# Patient Record
Sex: FEMALE | Race: WHITE | NOT HISPANIC OR LATINO | Employment: OTHER | URBAN - NONMETROPOLITAN AREA
[De-identification: names, ages, dates, MRNs, and addresses within clinical notes are randomized per-mention and may not be internally consistent; named-entity substitution may affect disease eponyms.]

---

## 2021-11-22 ENCOUNTER — OFFICE VISIT (OUTPATIENT)
Dept: CARDIOLOGY | Facility: CLINIC | Age: 66
End: 2021-11-22

## 2021-11-22 VITALS
HEIGHT: 67 IN | DIASTOLIC BLOOD PRESSURE: 85 MMHG | OXYGEN SATURATION: 100 % | HEART RATE: 61 BPM | SYSTOLIC BLOOD PRESSURE: 135 MMHG | WEIGHT: 146 LBS | BODY MASS INDEX: 22.91 KG/M2

## 2021-11-22 DIAGNOSIS — I35.0 AORTIC STENOSIS, MODERATE: ICD-10-CM

## 2021-11-22 DIAGNOSIS — I10 PRIMARY HYPERTENSION: ICD-10-CM

## 2021-11-22 DIAGNOSIS — Q23.1 BICUSPID AORTIC VALVE: Primary | ICD-10-CM

## 2021-11-22 DIAGNOSIS — R07.9 CHEST PAIN, UNSPECIFIED TYPE: ICD-10-CM

## 2021-11-22 PROBLEM — Q23.81 BICUSPID AORTIC VALVE: Status: ACTIVE | Noted: 2021-11-22

## 2021-11-22 PROCEDURE — 99204 OFFICE O/P NEW MOD 45 MIN: CPT | Performed by: INTERNAL MEDICINE

## 2021-11-22 PROCEDURE — 93000 ELECTROCARDIOGRAM COMPLETE: CPT | Performed by: INTERNAL MEDICINE

## 2021-11-22 RX ORDER — MULTIPLE VITAMINS W/ MINERALS TAB 9MG-400MCG
1 TAB ORAL DAILY
COMMUNITY

## 2021-11-22 RX ORDER — BISOPROLOL FUMARATE AND HYDROCHLOROTHIAZIDE 5; 6.25 MG/1; MG/1
1 TABLET ORAL DAILY
COMMUNITY
Start: 2021-09-29 | End: 2022-07-21 | Stop reason: SDUPTHER

## 2021-11-22 RX ORDER — LANSOPRAZOLE 15 MG/1
15 CAPSULE, DELAYED RELEASE ORAL AS NEEDED
COMMUNITY
End: 2022-06-30 | Stop reason: ALTCHOICE

## 2021-11-22 RX ORDER — ALPRAZOLAM 0.5 MG/1
TABLET ORAL 2 TIMES DAILY PRN
COMMUNITY
End: 2022-06-30

## 2021-11-22 RX ORDER — ASCORBIC ACID 500 MG
500 TABLET ORAL DAILY
COMMUNITY

## 2021-11-22 RX ORDER — AMLODIPINE BESYLATE 2.5 MG/1
TABLET ORAL AS NEEDED
COMMUNITY
Start: 2021-09-29 | End: 2022-06-30

## 2021-11-22 RX ORDER — AMLODIPINE BESYLATE AND BENAZEPRIL HYDROCHLORIDE 5; 10 MG/1; MG/1
CAPSULE ORAL
COMMUNITY
Start: 2021-10-25 | End: 2021-12-28 | Stop reason: HOSPADM

## 2021-11-22 NOTE — PROGRESS NOTES
"    [facility] - CARDIOLOGY  New Patient Initial Outpatient Evaluation    Primary Care Physician: System, Provider Not In    Subjective     Chief Complaint: seeking second opinion regarding valvular heart disease    History of Present Illness  65yo referred by her primary provider, Hazel Mcmahan (APRN) for second opinion regarding management of valvular heart disease.  Fortunately, no records from the patient's cardiologist are available for review today, and we have not yet received the disc of echocardiographic images that we have previously requested from her prior cardiologist.  The patient is able to tell me that she has undergone several recent tests with her cardiologist, Dr. Agosto in [facility], and that he told her that \"he did not want to send her to [facility] yet for TAVR because the data was not yet known.\"  She also references that she was previously told that \"she was born with an abnormal valve.\"    Patient does produce, as well as data we received from her PCP, some handwritten records from the nurse from her cardiologist the mention of bicuspid aortic valve and moderate aortic stenosis along with a mild aortic aneurysm.  However, these handwritten notes are all on a visit from 2020.  There are no notes from  included.    In last 3 weeks, she's started noticing 'a lot of pressure' across her upper chest while up moving about the house.  Typically relieves with rest; no aggravating factors.  Total of about 4-5 occasions over the last 3-4 months. Associated symptoms include numbness - \"sometimes I feel like both my hands are numb.\"  Does not radiate.  Have lasted about 30-45 minutes.  No associated dyspnea, nausea, or diaphoresis.  On two of these occasions, she's had palpitations described as \"fluttering.\" She did have some episodes of chest heaviness back in Feb after her sister  of COVID; responded once then to xanax. Overall, symptoms now are dissimilar to those in February, " primarily because of pain/discomfort that is new.    No soa, except some dyspnea with housework that she attributes to COVID she had in August.  She has no associated orthopnea, PND, rapid weight gain, or significant leg swelling.  She does question some degree of mild ankle swelling she notices when she is on her feet for quite some time, that she states completely resolves within 30 minutes of elevating her legs.    Says she has had extensive workup done within the last month at the [facility] in [facility], including what sounded like a nuclear perfusion stress test a few weeks ago         Review of Systems   Constitutional: Negative for malaise/fatigue.   Cardiovascular: Positive for chest pain, irregular heartbeat, leg swelling (ankle) and palpitations. Negative for claudication, dyspnea on exertion, near-syncope, orthopnea, paroxysmal nocturnal dyspnea and syncope.   Respiratory: Negative for shortness of breath.    Hematologic/Lymphatic: Does not bruise/bleed easily.        Otherwise complete ROS reviewed and negative except as mentioned in the HPI.      Past Medical History:   Past Medical History:   Diagnosis Date   • Aneurysm (HCC)    • Congenital heart disease    • Heart murmur    • Heart valve disease    • Hypertension        Past Surgical History:History reviewed. No pertinent surgical history.    Family History: family history includes Heart attack in her father, maternal grandmother, and mother; Hypertension in her brother and brother.    Social History:  reports that she has never smoked. She has never used smokeless tobacco. She reports that she does not drink alcohol and does not use drugs.    Medications:  Prior to Admission medications    Medication Sig Start Date End Date Taking? Authorizing Provider   ALPRAZolam (XANAX) 0.5 MG tablet Take 0.5 mg by mouth 2 (Two) Times a Day As Needed for Anxiety.   Yes Provider, MD Jose D   amLODIPine (NORVASC) 2.5 MG tablet As Needed. Takes  "when bp is high 9/29/21  Yes Jose D Weller MD   amLODIPine-benazepril (LOTREL 5-10) 5-10 MG per capsule  10/25/21  Yes Jose D Weller MD   ascorbic acid (VITAMIN C) 500 MG tablet Take 500 mg by mouth Daily.   Yes Jose D Weller MD   ASPIRIN 81 PO Take 81 mg by mouth Daily.   Yes Jose D Weller MD   B Complex-C (VITAMIN B + C COMPLEX PO) Take  by mouth.   Yes Jose D Weller MD   bisoprolol-hydrochlorothiazide (ZIAC) 5-6.25 MG per tablet Take 1 tablet by mouth Daily. 9/29/21  Yes Jose D Weller MD   CALCIUM PO Take  by mouth.   Yes Jose D Weller MD   Cholecalciferol (D3 ADULT PO) Take  by mouth.   Yes Jose D Weller MD   Cyanocobalamin 1000 MCG/ML kit Inject  as directed.   Yes Jose D Weller MD   GARLIC PO Take  by mouth.   Yes Jose D Weller MD   lansoprazole (PREVACID) 15 MG capsule Take 15 mg by mouth As Needed.   Yes Jose D Weller MD   Multiple Vitamins-Minerals (ZINC PO) Take  by mouth.   Yes Jose D Weller MD   multivitamin with minerals tablet tablet Take 1 tablet by mouth Daily.   Yes Jose D Weller MD   Omega-3 Fatty Acids (OMEGA-3 FISH OIL PO) Take  by mouth.   Yes Jose D Weller MD   Probiotic Product (PROBIOTIC ADVANCED PO) Take  by mouth.   Yes Jose D Weller MD     Allergies:  Allergies   Allergen Reactions   • Penicillins Swelling   • Sulfa Antibiotics Hives       Objective     Vital Signs: /85   Pulse 61   Ht 169.2 cm (66.6\")   Wt 66.2 kg (146 lb)   SpO2 100%   BMI 23.14 kg/m²     Vitals and nursing note reviewed.   Constitutional:       General: Not in acute distress.     Appearance: Not in distress.   Neck:      Vascular: No JVD or JVR. JVD normal.   Pulmonary:      Effort: Pulmonary effort is normal.      Breath sounds: Normal breath sounds.   Cardiovascular:      Normal rate. Regular rhythm.      Murmurs: There is a grade 3/6 harsh midsystolic murmur at the URSB, radiating to the " neck. S2 is audible      No gallop. No click.   Pulses:     Intact distal pulses.   Edema:     Peripheral edema absent.   Musculoskeletal:         General: No tenderness. Skin:     General: Skin is warm and dry.   Neurological:      Mental Status: Alert, oriented to person, place, and time and oriented to person, place and time.         Results Reviewed:      ECG 12 Lead    Date/Time: 11/22/2021 4:59 PM  Performed by: Alexis Randhawa MD  Authorized by: Alexis Randhawa MD   Comparison: compared with previous ECG from 2/5/2021  Similar to previous ECG  Rhythm: sinus rhythm  Rate: normal  BPM: 61  Other findings: left ventricular hypertrophy    Clinical impression: abnormal EKG          External records reviewed: Records received from her PCP, Hazel Mcmahan (APRN): Included several visit summaries from her cardiologist with handwritten notes (which the patient tells me  External records reviewed:  Records provided with a referral from her primary provider (SHONDA Croft) were reviewed. These do include several visit summary sheets from 2020 visits with her cardiologist, Dr. 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  Assessment / Plan        Problem List Items Addressed This Visit (all new to me)       Cardiac and Vasculature    Bicuspid aortic valve - Primary    Other Relevant Orders    ECG 12 Lead    Hypertension: Appears well controlled    Relevant Medications    bisoprolol-hydrochlorothiazide (ZIAC) 5-6.25 MG per tablet    amLODIPine-benazepril (LOTREL 5-10) 5-10 MG per capsule    amLODIPine (NORVASC) 2.5 MG tablet    Other Relevant Orders    ECG 12 Lead    Aortic stenosis, moderate    Other Relevant Orders    ECG 12 Lead      Other Visit Diagnoses     Chest pain, unspecified type: Undifferentiated  etiology deserving of ischemic evaluation, though by way of subjective report it sounds like she has just completed 1 in [place]; records pending                     Hypertrophic cardiomyopathy: Unclear whether this is a confirmed diagnosis or not; is hand written on one sheet the patient provides from March 2020 visit with another cardiologist.     Recommendations and plans: By subjective report, patient has had multiple recent cardiac testing under the direction of her cardiologist in [place], [place]. She now wishes to transfer her care to us, but unfortunately, none of those recent records are available during our review today. A note from March 2020 has handwritten labels on it suggesting moderate aortic stenosis at that time along with a bicuspid valve and a mild degree of aortopathy. Clearly, more updated testing is needed for more accurate assessment of current severity. Regardless, even if her aortic stenosis is now severe by echocardiographic criteria, I doubt that she is symptomatic from it (ie no dyspnea, CHF symptoms), though would then have to further question whether the described chest discomfort is from the aortic stenosis.    If aortic stenosis is not severe, then she would need an ischemic evaluation to investigate her chest pain. However, it also sounds like she may have just had a nuclear stress test, so we will wait to obtain this, as well as the most recent echocardiogram to determine my own assessment of the severity of her aortic stenosis, before providing further advice.     We did spend the bulk of today's visit discussing the multidisciplinary Structural Heart Team approach here at [facility]. Once more information is available, I will review her case with the rest of our structural heart valve team in our weekly case review conference.    In the meantime, continue all current medical therapies. Follow-up will be determined after we have reviewed the necessary  "records.        Alexis Randhawa MD   11/22/21   18:20 CST     Addendum:  -Though I have not yet received the disc of images from the most recent echocardiogram to provide my own independent review, we did receive more updated records that were available during the time of my initial visit with the patient.  There was an office note from 9/29/2021 with Dr. RONDA Agosto of the [facility] in [place], [place].  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    -Echocardiogram performed that day on 9/29/2021, as interpreted by Dr. Agosto, reports the following: LV systolic cavity obliteration with no EF reported, mild LVH, grade 1 diastolic dysfunction, normal size left atrium, normal size and function of the right ventricle, mildly thickened mitral valve with mild mitral regurgitation.  He describes aortic valve is trileaflet and sclerotic, but then also states it is calcified with moderate to severe stenosis.  Velocity is reported in the LVOT at 3.9 m/s with a gradient of 15 mmHg and an aortic valve area of 0.9 cm².  He reports the aortic root itself is of normal size.    -Myocardial perfusion imaging graded stress test performed on 11/4/2021, interpreted by Dr. Agosto: States the patient did exercise by Efraín protocol, but does not report how long she was able to exercise.  She did reach target heart rate and it does say there were no EKG changes nor angina with exercise (EKGs for reports of the exercise portion of the test are otherwise not available).  He does report a Duke treadmill score +6, so I do " assume this means she exercised for the entirety of stage 1 and 2 of Efraín protocol (i.e. 6 minutes).  He reports abnormal perfusion imaging with inferoapical and basal basal septal thinning in the conclusions, but there is no mention in the body of the report regarding the perfusion images.    Updated recommendations and plans: In light of the above, I would still like to see the actual images from echocardiogram to generate my own opinion before making any more definitive plans.  The reported abnormalities from the myocardial perfusion imaging study are not comprehensively described, but I may still recommend diagnostic coronary angiography for diagnostic clarity since she is describing some exertional upper chest discomfort that is otherwise undifferentiated and does not seem to be likely from her valvular heart disease at this point, provided that my review of the echocardiogram does not reveal aortic valvular disease that is more severe than reported.    Electronically signed by Alexis Randhawa MD, 11/26/21, 8:28 AM CST.

## 2021-12-10 ENCOUNTER — TELEPHONE (OUTPATIENT)
Dept: CARDIOLOGY | Facility: CLINIC | Age: 66
End: 2021-12-10

## 2021-12-10 ENCOUNTER — PREP FOR SURGERY (OUTPATIENT)
Dept: OTHER | Facility: HOSPITAL | Age: 66
End: 2021-12-10

## 2021-12-10 DIAGNOSIS — R94.39 ABNORMAL STRESS TEST: Primary | ICD-10-CM

## 2021-12-10 RX ORDER — SODIUM CHLORIDE 0.9 % (FLUSH) 0.9 %
3 SYRINGE (ML) INJECTION EVERY 12 HOURS SCHEDULED
Status: CANCELLED | OUTPATIENT
Start: 2021-12-10

## 2021-12-10 RX ORDER — ASPIRIN 81 MG/1
324 TABLET, CHEWABLE ORAL ONCE
Status: CANCELLED | OUTPATIENT
Start: 2021-12-10 | End: 2021-12-10

## 2021-12-10 RX ORDER — SODIUM CHLORIDE 9 MG/ML
1-3 INJECTION, SOLUTION INTRAVENOUS CONTINUOUS
Status: CANCELLED | OUTPATIENT
Start: 2021-12-10

## 2021-12-10 RX ORDER — ASPIRIN 81 MG/1
81 TABLET ORAL DAILY
Status: CANCELLED | OUTPATIENT
Start: 2021-12-11

## 2021-12-10 RX ORDER — SODIUM CHLORIDE 0.9 % (FLUSH) 0.9 %
10 SYRINGE (ML) INJECTION AS NEEDED
Status: CANCELLED | OUTPATIENT
Start: 2021-12-10

## 2021-12-10 NOTE — TELEPHONE ENCOUNTER
Call back from pt. Informed her that her echo showed moderate AS, not severe per Dr. Randhawa. I also let her know that Dr. Randhawa would like to do a heart cath on her since she did have an abnormal stress and given her previous symptoms.    She verbalized understanding and is agreeable. She asked if she could wait and have her cath after [place]. I will check with Dr. Randhawa to be sure that is ok, but instructed her to report to ER if she has any chest tightness/pain/pressure or SOA that does not go away with rest. She verbalized understanding and reports that she has had no more episodes since her appt with Dr. Randhawa.

## 2021-12-10 NOTE — TELEPHONE ENCOUNTER
Call to pt to let her know that Dr. Randhawa has reviewed her 2D Echo from the other facility. Left a msg with my call back number requesting a call back.

## 2021-12-13 PROBLEM — R94.39 ABNORMAL STRESS TEST: Status: ACTIVE | Noted: 2021-12-13

## 2021-12-28 ENCOUNTER — HOSPITAL ENCOUNTER (OUTPATIENT)
Facility: HOSPITAL | Age: 66
Discharge: HOME | End: 2021-12-28
Attending: INTERNAL MEDICINE | Admitting: INTERNAL MEDICINE

## 2021-12-28 VITALS
WEIGHT: 143.2 LBS | BODY MASS INDEX: 23.01 KG/M2 | SYSTOLIC BLOOD PRESSURE: 115 MMHG | DIASTOLIC BLOOD PRESSURE: 71 MMHG | RESPIRATION RATE: 17 BRPM | TEMPERATURE: 96.2 F | OXYGEN SATURATION: 96 % | HEIGHT: 66 IN | HEART RATE: 62 BPM

## 2021-12-28 DIAGNOSIS — R94.39 ABNORMAL STRESS TEST: ICD-10-CM

## 2021-12-28 LAB
ALBUMIN SERPL-MCNC: 4.3 G/DL (ref 3.5–5.2)
ALBUMIN/GLOB SERPL: 1.7 G/DL
ALP SERPL-CCNC: 87 U/L (ref 39–117)
ALT SERPL W P-5'-P-CCNC: 16 U/L (ref 1–33)
ANION GAP SERPL CALCULATED.3IONS-SCNC: 9 MMOL/L (ref 5–15)
AST SERPL-CCNC: 23 U/L (ref 1–32)
BASOPHILS # BLD AUTO: 0.02 10*3/MM3 (ref 0–0.2)
BASOPHILS NFR BLD AUTO: 0.3 % (ref 0–1.5)
BILIRUB SERPL-MCNC: 0.5 MG/DL (ref 0–1.2)
BUN SERPL-MCNC: 20 MG/DL (ref 8–23)
BUN/CREAT SERPL: 22.2 (ref 7–25)
CALCIUM SPEC-SCNC: 9.6 MG/DL (ref 8.6–10.5)
CHLORIDE SERPL-SCNC: 103 MMOL/L (ref 98–107)
CO2 SERPL-SCNC: 28 MMOL/L (ref 22–29)
CREAT SERPL-MCNC: 0.9 MG/DL (ref 0.57–1)
DEPRECATED RDW RBC AUTO: 40.1 FL (ref 37–54)
EOSINOPHIL # BLD AUTO: 0.24 10*3/MM3 (ref 0–0.4)
EOSINOPHIL NFR BLD AUTO: 3.7 % (ref 0.3–6.2)
ERYTHROCYTE [DISTWIDTH] IN BLOOD BY AUTOMATED COUNT: 12 % (ref 12.3–15.4)
GFR SERPL CREATININE-BSD FRML MDRD: 62 ML/MIN/1.73
GLOBULIN UR ELPH-MCNC: 2.5 GM/DL
GLUCOSE SERPL-MCNC: 116 MG/DL (ref 65–99)
HCT VFR BLD AUTO: 40.2 % (ref 34–46.6)
HGB BLD-MCNC: 13.3 G/DL (ref 12–15.9)
IMM GRANULOCYTES # BLD AUTO: 0.02 10*3/MM3 (ref 0–0.05)
IMM GRANULOCYTES NFR BLD AUTO: 0.3 % (ref 0–0.5)
LYMPHOCYTES # BLD AUTO: 2.03 10*3/MM3 (ref 0.7–3.1)
LYMPHOCYTES NFR BLD AUTO: 31.2 % (ref 19.6–45.3)
MCH RBC QN AUTO: 30.3 PG (ref 26.6–33)
MCHC RBC AUTO-ENTMCNC: 33.1 G/DL (ref 31.5–35.7)
MCV RBC AUTO: 91.6 FL (ref 79–97)
MONOCYTES # BLD AUTO: 0.49 10*3/MM3 (ref 0.1–0.9)
MONOCYTES NFR BLD AUTO: 7.5 % (ref 5–12)
NEUTROPHILS NFR BLD AUTO: 3.71 10*3/MM3 (ref 1.7–7)
NEUTROPHILS NFR BLD AUTO: 57 % (ref 42.7–76)
NRBC BLD AUTO-RTO: 0 /100 WBC (ref 0–0.2)
PLATELET # BLD AUTO: 237 10*3/MM3 (ref 140–450)
PMV BLD AUTO: 9.7 FL (ref 6–12)
POTASSIUM SERPL-SCNC: 4 MMOL/L (ref 3.5–5.2)
PROT SERPL-MCNC: 6.8 G/DL (ref 6–8.5)
RBC # BLD AUTO: 4.39 10*6/MM3 (ref 3.77–5.28)
SODIUM SERPL-SCNC: 140 MMOL/L (ref 136–145)
WBC NRBC COR # BLD: 6.51 10*3/MM3 (ref 3.4–10.8)

## 2021-12-28 PROCEDURE — 93458 L HRT ARTERY/VENTRICLE ANGIO: CPT | Performed by: INTERNAL MEDICINE

## 2021-12-28 PROCEDURE — 25010000002 HEPARIN (PORCINE) PER 1000 UNITS: Performed by: INTERNAL MEDICINE

## 2021-12-28 PROCEDURE — C1894 INTRO/SHEATH, NON-LASER: HCPCS | Performed by: INTERNAL MEDICINE

## 2021-12-28 PROCEDURE — 25010000002 HEPARIN (PORCINE) 2000-0.9 UNIT/L-% SOLUTION: Performed by: INTERNAL MEDICINE

## 2021-12-28 PROCEDURE — A9270 NON-COVERED ITEM OR SERVICE: HCPCS | Performed by: INTERNAL MEDICINE

## 2021-12-28 PROCEDURE — 25010000002 HEPARIN (PORCINE) 1000-0.9 UT/500ML-% SOLUTION: Performed by: INTERNAL MEDICINE

## 2021-12-28 PROCEDURE — 25010000002 MIDAZOLAM PER 1 MG: Performed by: INTERNAL MEDICINE

## 2021-12-28 PROCEDURE — 63710000001 ASPIRIN 81 MG CHEWABLE TABLET: Performed by: INTERNAL MEDICINE

## 2021-12-28 PROCEDURE — 85025 COMPLETE CBC W/AUTO DIFF WBC: CPT | Performed by: INTERNAL MEDICINE

## 2021-12-28 PROCEDURE — 0 IOPAMIDOL PER 1 ML: Performed by: INTERNAL MEDICINE

## 2021-12-28 PROCEDURE — 25010000002 DIPHENHYDRAMINE PER 50 MG: Performed by: INTERNAL MEDICINE

## 2021-12-28 PROCEDURE — 99152 MOD SED SAME PHYS/QHP 5/>YRS: CPT | Performed by: INTERNAL MEDICINE

## 2021-12-28 PROCEDURE — 80053 COMPREHEN METABOLIC PANEL: CPT | Performed by: INTERNAL MEDICINE

## 2021-12-28 PROCEDURE — 25010000002 FENTANYL CITRATE (PF) 50 MCG/ML SOLUTION: Performed by: INTERNAL MEDICINE

## 2021-12-28 RX ORDER — SODIUM CHLORIDE 0.9 % (FLUSH) 0.9 %
3 SYRINGE (ML) INJECTION EVERY 12 HOURS SCHEDULED
Status: DISCONTINUED | OUTPATIENT
Start: 2021-12-28 | End: 2021-12-28 | Stop reason: HOSPADM

## 2021-12-28 RX ORDER — ASPIRIN 81 MG/1
81 TABLET ORAL DAILY
Status: DISCONTINUED | OUTPATIENT
Start: 2021-12-29 | End: 2021-12-28 | Stop reason: HOSPADM

## 2021-12-28 RX ORDER — HEPARIN SODIUM 200 [USP'U]/100ML
INJECTION, SOLUTION INTRAVENOUS AS NEEDED
Status: DISCONTINUED | OUTPATIENT
Start: 2021-12-28 | End: 2021-12-28 | Stop reason: HOSPADM

## 2021-12-28 RX ORDER — FENTANYL CITRATE 50 UG/ML
INJECTION, SOLUTION INTRAMUSCULAR; INTRAVENOUS AS NEEDED
Status: DISCONTINUED | OUTPATIENT
Start: 2021-12-28 | End: 2021-12-28 | Stop reason: HOSPADM

## 2021-12-28 RX ORDER — LIDOCAINE HYDROCHLORIDE 20 MG/ML
INJECTION, SOLUTION INFILTRATION; PERINEURAL AS NEEDED
Status: DISCONTINUED | OUTPATIENT
Start: 2021-12-28 | End: 2021-12-28 | Stop reason: HOSPADM

## 2021-12-28 RX ORDER — SODIUM CHLORIDE 9 MG/ML
1-3 INJECTION, SOLUTION INTRAVENOUS CONTINUOUS
Status: DISCONTINUED | OUTPATIENT
Start: 2021-12-28 | End: 2021-12-28 | Stop reason: HOSPADM

## 2021-12-28 RX ORDER — SODIUM CHLORIDE 9 MG/ML
100 INJECTION, SOLUTION INTRAVENOUS CONTINUOUS
Status: DISCONTINUED | OUTPATIENT
Start: 2021-12-28 | End: 2021-12-28 | Stop reason: HOSPADM

## 2021-12-28 RX ORDER — SODIUM CHLORIDE 0.9 % (FLUSH) 0.9 %
10 SYRINGE (ML) INJECTION AS NEEDED
Status: DISCONTINUED | OUTPATIENT
Start: 2021-12-28 | End: 2021-12-28 | Stop reason: HOSPADM

## 2021-12-28 RX ORDER — ASPIRIN 81 MG/1
TABLET, CHEWABLE ORAL AS NEEDED
Status: DISCONTINUED | OUTPATIENT
Start: 2021-12-28 | End: 2021-12-28 | Stop reason: HOSPADM

## 2021-12-28 RX ORDER — ACETAMINOPHEN 325 MG/1
650 TABLET ORAL EVERY 4 HOURS PRN
Status: DISCONTINUED | OUTPATIENT
Start: 2021-12-28 | End: 2021-12-28 | Stop reason: HOSPADM

## 2021-12-28 RX ORDER — MIDAZOLAM HYDROCHLORIDE 1 MG/ML
INJECTION INTRAMUSCULAR; INTRAVENOUS AS NEEDED
Status: DISCONTINUED | OUTPATIENT
Start: 2021-12-28 | End: 2021-12-28 | Stop reason: HOSPADM

## 2021-12-28 RX ORDER — LISINOPRIL 20 MG/1
20 TABLET ORAL DAILY
Qty: 30 TABLET | Refills: 11 | Status: SHIPPED | OUTPATIENT
Start: 2021-12-28 | End: 2023-01-09

## 2021-12-28 RX ORDER — DIPHENHYDRAMINE HYDROCHLORIDE 50 MG/ML
INJECTION INTRAMUSCULAR; INTRAVENOUS AS NEEDED
Status: DISCONTINUED | OUTPATIENT
Start: 2021-12-28 | End: 2021-12-28 | Stop reason: HOSPADM

## 2021-12-28 RX ORDER — ASPIRIN 81 MG/1
324 TABLET, CHEWABLE ORAL ONCE
Status: DISCONTINUED | OUTPATIENT
Start: 2021-12-28 | End: 2021-12-28 | Stop reason: HOSPADM

## 2022-06-30 ENCOUNTER — OFFICE VISIT (OUTPATIENT)
Dept: CARDIOLOGY | Facility: CLINIC | Age: 67
End: 2022-06-30

## 2022-06-30 VITALS
HEART RATE: 53 BPM | WEIGHT: 145 LBS | BODY MASS INDEX: 23.3 KG/M2 | OXYGEN SATURATION: 98 % | DIASTOLIC BLOOD PRESSURE: 80 MMHG | SYSTOLIC BLOOD PRESSURE: 130 MMHG | HEIGHT: 66 IN

## 2022-06-30 DIAGNOSIS — I35.0 AORTIC STENOSIS, MODERATE: Primary | ICD-10-CM

## 2022-06-30 DIAGNOSIS — I10 PRIMARY HYPERTENSION: ICD-10-CM

## 2022-06-30 DIAGNOSIS — Q23.1 BICUSPID AORTIC VALVE: ICD-10-CM

## 2022-06-30 PROBLEM — R94.39 ABNORMAL STRESS TEST: Status: RESOLVED | Noted: 2021-12-13 | Resolved: 2022-06-30

## 2022-06-30 PROCEDURE — 93000 ELECTROCARDIOGRAM COMPLETE: CPT | Performed by: INTERNAL MEDICINE

## 2022-06-30 PROCEDURE — 99214 OFFICE O/P EST MOD 30 MIN: CPT | Performed by: INTERNAL MEDICINE

## 2022-06-30 RX ORDER — FAMOTIDINE 20 MG/1
20 TABLET, FILM COATED ORAL AS NEEDED
COMMUNITY

## 2022-06-30 NOTE — PROGRESS NOTES
Subjective:     Encounter Date:06/30/2022      Patient ID: Mary Salazar is a 67 y.o. female.    Chief Complaint:  History of Present Illness  67-year-old female returns today for routine follow-up of valvular heart disease.  She was initially referred to me November 2021 for second opinion regarding this.  She had been previously followed with by a cardiologist in [place], [place], with records we obtained suggesting he had initially diagnosed her with obstructive hypertrophic cardiomyopathy, bicuspid aortic valve, mild-moderate aortic stenosis.  Apparently there was some concern on older imaging regarding a possible aortic aneurysm, but the most recent echocardiogram performed in [place] before being referred to me described a normal sized aortic root.  She also had undergone nuclear perfusion stress test on 11/4/2021, which he interpreted as abnormal though perfusion images were not commented on in the report.  When we first met, she was complaining upper chest pressure particularly when up and moving around, that would relieved with rest.  Given all the above, I elected to pursue invasive study both for coronary assessment, valve assessment, and aortic assessment (as prior records had also mentioned a possible coarctation of the aorta).  I performed coronary angiography and left heart catheterization on 12/29/2021, revealing normal coronary arteries, an aortic valve that was relatively easy to cross with pullback gradient demonstrating mild to moderate aortic stenosis, LV gram showing normal LVEF, and no evidence of coarctation from the descending aorta pulling the catheter back into the aortic arch (i.e. no pressure gradient).  At the end of that procedure, I did change her antihypertensive regimen by prescribing lisinopril to replace Norvasc, thinking perhaps Norvasc was responsible for some of her lower extremity edema.  She returns today for follow-up.    She says she's doing great.  The leg swelling  resolved with discontinuation of norvasc. Her BP has remained well controlled on lisinopril.  She's not had any SOA or angina, orthopnea, or PND.    The following portions of the patient's history were reviewed and updated as appropriate: allergies, current medications, past family history, past medical history, past social history, past surgical history and problem list.    Review of Systems   Constitutional: Negative for malaise/fatigue.   Cardiovascular: Negative for chest pain, claudication, dyspnea on exertion, leg swelling, near-syncope, orthopnea, palpitations, paroxysmal nocturnal dyspnea and syncope.   Respiratory: Negative for shortness of breath.    Hematologic/Lymphatic: Does not bruise/bleed easily.       Current Outpatient Medications:   •  ascorbic acid (VITAMIN C) 500 MG tablet, Take 500 mg by mouth Daily., Disp: , Rfl:   •  ASPIRIN 81 PO, Take 81 mg by mouth Daily., Disp: , Rfl:   •  B Complex-C (VITAMIN B + C COMPLEX PO), Take  by mouth., Disp: , Rfl:   •  bisoprolol-hydrochlorothiazide (ZIAC) 5-6.25 MG per tablet, Take 1 tablet by mouth Daily., Disp: , Rfl:   •  CALCIUM PO, Take  by mouth., Disp: , Rfl:   •  Cholecalciferol (D3 ADULT PO), Take  by mouth., Disp: , Rfl:   •  CLINDAMYCIN HCL PO, Take  by mouth. Taking before root canal, Disp: , Rfl:   •  Cyanocobalamin 1000 MCG/ML kit, Inject  as directed., Disp: , Rfl:   •  famotidine (PEPCID) 20 MG tablet, Take 20 mg by mouth As Needed for Heartburn., Disp: , Rfl:   •  lisinopril (PRINIVIL,ZESTRIL) 20 MG tablet, Take 1 tablet by mouth Daily., Disp: 30 tablet, Rfl: 11  •  Multiple Vitamins-Minerals (ZINC PO), Take  by mouth., Disp: , Rfl:   •  multivitamin with minerals tablet tablet, Take 1 tablet by mouth Daily., Disp: , Rfl:   •  Omega-3 Fatty Acids (OMEGA-3 FISH OIL PO), Take  by mouth., Disp: , Rfl:   •  Probiotic Product (PROBIOTIC ADVANCED PO), Take  by mouth., Disp: , Rfl:   •  ALPRAZolam (XANAX) 0.5 MG tablet, Take  by mouth 2 (Two) Times a  Day As Needed for Anxiety., Disp: , Rfl:        Objective:      Vitals:    06/30/22 0920   BP: 130/80   Pulse: 53   SpO2: 98%     Vitals and nursing note reviewed.   Constitutional:       General: Not in acute distress.     Appearance: Not in distress.   Neck:      Vascular: No JVD or JVR. JVD normal.   Pulmonary:      Effort: Pulmonary effort is normal.      Breath sounds: Normal breath sounds.   Cardiovascular:      Normal rate. Regular rhythm.      Murmurs: There is no murmur.      No gallop. No rub.   Pulses:     Intact distal pulses.   Edema:     Peripheral edema absent.   Skin:     General: Skin is warm and dry.   Neurological:      Mental Status: Alert, oriented to person, place, and time and oriented to person, place and time.         Lab Review:         ECG 12 Lead    Date/Time: 6/30/2022 11:44 AM  Performed by: Alexis Randhawa MD  Authorized by: Alexis Randhawa MD   Comparison: compared with previous ECG from 11/22/2021  Comparison to previous ECG: Voltage criteria for LVH no longer present  Rhythm: sinus bradycardia  Rate: bradycardic  BPM: 53  QRS axis: normal  Clinical impression comment: Otherwise normal ECG              Labs from sept '21 reviewed: tc 172, tg 219, ldl 101, hdl 36.  Cr 0.73.  A1c 5.7%  Assessment/Plan:     Problem List Items Addressed This Visit        Cardiac and Vasculature    Bicuspid aortic valve    Hypertension    Aortic stenosis, moderate - Primary            Recommendations/plans:  1.  Valvular heart disease (stage B, NYHA I): Stable; reportedly with bicuspid aortic valve though imaging here has not yet been obtained.    -Repeat echocardiogram and clinic f/u in 1 year    2.  Essential hypertension, well controlled.  Continue lisinopril (previously discontinued Norvasc for leg swelling, which resolved when it was stopped)      Alexis Randhawa MD  06/30/2022  09:57 CDT

## 2022-07-21 RX ORDER — BISOPROLOL FUMARATE AND HYDROCHLOROTHIAZIDE 5; 6.25 MG/1; MG/1
1 TABLET ORAL DAILY
Qty: 90 TABLET | Refills: 3 | Status: SHIPPED | OUTPATIENT
Start: 2022-07-21

## 2022-07-21 NOTE — TELEPHONE ENCOUNTER
Pt called left a  msg stating that she will need a refill on the Ziac sent to [facility] in [place].  Please sign Rx.  John Berg CMA

## 2022-08-22 ENCOUNTER — TELEPHONE (OUTPATIENT)
Dept: CARDIOLOGY | Facility: CLINIC | Age: 67
End: 2022-08-22

## 2022-08-22 NOTE — TELEPHONE ENCOUNTER
Caller: Mary Salazar    Relationship: Self    Best call back number: [phone]    What is the best time to reach you: AFTER NOON     What was the call regarding: PATIENT AS CALLED FOR JURY DUTY, WANTS TO KNOW IF SHE CAN GET A DR. NOTE EXCUSING HER FROM THIS DUE TO HER LEAKY VALVE. STATED THAT STUFF LIKE THIS GETS HER ALL SHOOK UP AND SHE WOULD PREFER NOT TO DO IT. JURY DUTY STARTS 09/06/22 AND LAST FOR 4 MONTHS     Do you require a callback: YES

## 2023-01-10 RX ORDER — LISINOPRIL 20 MG/1
TABLET ORAL
Qty: 90 TABLET | Refills: 3 | Status: SHIPPED | OUTPATIENT
Start: 2023-01-10

## 2023-10-11 ENCOUNTER — HOSPITAL ENCOUNTER (OUTPATIENT)
Dept: CARDIOLOGY | Facility: HOSPITAL | Age: 68
Discharge: HOME | End: 2023-10-11
Payer: MEDICARE

## 2023-10-11 ENCOUNTER — OFFICE VISIT (OUTPATIENT)
Dept: CARDIOLOGY | Facility: CLINIC | Age: 68
End: 2023-10-11
Payer: MEDICARE

## 2023-10-11 VITALS
HEART RATE: 59 BPM | SYSTOLIC BLOOD PRESSURE: 130 MMHG | WEIGHT: 153 LBS | OXYGEN SATURATION: 99 % | DIASTOLIC BLOOD PRESSURE: 75 MMHG | HEIGHT: 66 IN | BODY MASS INDEX: 24.59 KG/M2

## 2023-10-11 VITALS
BODY MASS INDEX: 23.78 KG/M2 | DIASTOLIC BLOOD PRESSURE: 64 MMHG | WEIGHT: 148 LBS | HEIGHT: 66 IN | SYSTOLIC BLOOD PRESSURE: 120 MMHG

## 2023-10-11 DIAGNOSIS — I35.0 AORTIC STENOSIS, MODERATE: ICD-10-CM

## 2023-10-11 DIAGNOSIS — I35.0 AORTIC STENOSIS, MODERATE: Primary | ICD-10-CM

## 2023-10-11 DIAGNOSIS — Q23.1 BICUSPID AORTIC VALVE: ICD-10-CM

## 2023-10-11 DIAGNOSIS — I10 PRIMARY HYPERTENSION: ICD-10-CM

## 2023-10-11 LAB
BH CV ECHO MEAS - AO MAX PG: 65.6 MMHG
BH CV ECHO MEAS - AO MEAN PG: 36 MMHG
BH CV ECHO MEAS - AO ROOT DIAM: 3 CM
BH CV ECHO MEAS - AO V2 MAX: 405 CM/SEC
BH CV ECHO MEAS - AO V2 VTI: 99.8 CM
BH CV ECHO MEAS - AVA(I,D): 0.86 CM2
BH CV ECHO MEAS - EDV(CUBED): 71 ML
BH CV ECHO MEAS - EDV(MOD-SP4): 57.8 ML
BH CV ECHO MEAS - EF(MOD-SP4): 61.4 %
BH CV ECHO MEAS - ESV(CUBED): 11.2 ML
BH CV ECHO MEAS - ESV(MOD-SP4): 22.3 ML
BH CV ECHO MEAS - FS: 45.9 %
BH CV ECHO MEAS - IVS/LVPW: 1.1 CM
BH CV ECHO MEAS - IVSD: 1.1 CM
BH CV ECHO MEAS - LA DIMENSION: 3.8 CM
BH CV ECHO MEAS - LAT PEAK E' VEL: 5.2 CM/SEC
BH CV ECHO MEAS - LV DIASTOLIC VOL/BSA (35-75): 32.8 CM2
BH CV ECHO MEAS - LV MASS(C)D: 143.7 GRAMS
BH CV ECHO MEAS - LV MAX PG: 4.9 MMHG
BH CV ECHO MEAS - LV MEAN PG: 3 MMHG
BH CV ECHO MEAS - LV SYSTOLIC VOL/BSA (12-30): 12.7 CM2
BH CV ECHO MEAS - LV V1 MAX: 111 CM/SEC
BH CV ECHO MEAS - LV V1 VTI: 30.3 CM
BH CV ECHO MEAS - LVIDD: 4.1 CM
BH CV ECHO MEAS - LVIDS: 2.24 CM
BH CV ECHO MEAS - LVOT AREA: 2.8 CM2
BH CV ECHO MEAS - LVOT DIAM: 1.9 CM
BH CV ECHO MEAS - LVPWD: 1 CM
BH CV ECHO MEAS - MED PEAK E' VEL: 4.7 CM/SEC
BH CV ECHO MEAS - MV A MAX VEL: 109 CM/SEC
BH CV ECHO MEAS - MV DEC TIME: 0.21 SEC
BH CV ECHO MEAS - MV E MAX VEL: 101 CM/SEC
BH CV ECHO MEAS - MV E/A: 0.93
BH CV ECHO MEAS - RAP SYSTOLE: 5 MMHG
BH CV ECHO MEAS - RVSP: 38.4 MMHG
BH CV ECHO MEAS - SI(MOD-SP4): 20.2 ML/M2
BH CV ECHO MEAS - SV(LVOT): 85.9 ML
BH CV ECHO MEAS - SV(MOD-SP4): 35.5 ML
BH CV ECHO MEAS - TR MAX PG: 33.4 MMHG
BH CV ECHO MEAS - TR MAX VEL: 289 CM/SEC
BH CV ECHO MEASUREMENTS AVERAGE E/E' RATIO: 20.4
LEFT ATRIUM VOLUME INDEX: 29.1 ML/M2
LEFT ATRIUM VOLUME: 51.3 ML

## 2023-10-11 PROCEDURE — 93306 TTE W/DOPPLER COMPLETE: CPT

## 2023-10-11 PROCEDURE — 93000 ELECTROCARDIOGRAM COMPLETE: CPT | Performed by: INTERNAL MEDICINE

## 2023-10-11 RX ORDER — ALPRAZOLAM 0.25 MG/1
TABLET ORAL
COMMUNITY
Start: 2023-10-05

## 2023-10-11 NOTE — PROGRESS NOTES
Subjective:     Encounter Date:10/11/2023      Patient ID: Mary Salazar is a 68 y.o. female.    Chief Complaint: Follow-up valvular heart disease (aortic stenosis)    History of Present Illness    Ms. Mary Salazar was last seen here in the office on 06/30/2023. By way of review, she had initially been seen here in 2022 after a cardiologist that she had previously followed with in [place], [place], had mentioned that she might have hypertrophic obstructive cardiomyopathy with a bicuspid aortic valve, and mild-to-moderate aortic stenosis. She was also complaining of upper chest discomfort when we met. This sounded rather typical for angina, and had already undergone a nuclear perfusion stress test in [place] in 11/2021 that had been reported as abnormal. Therefore, I did proceed with invasive assessment including catheterization to look at coronary arteries, and assess her aortic valve further as well as her aorta (coarctation has also been mentioned in other records). On 12/29/2021, I carried this out, and found normal coronary arteries, and aortic valve that was easily crossed, and a pullback gradient that demonstrated only mild to moderate stenosis. LVEF was normal. There was no evidence of coarctation when measuring continuous gradient from the descending aorta back into the aortic arch. I changed her blood pressure medication regimen at that point in time thinking Norvasc was causing some of her leg swelling. She later reported this did the trick, and leg swelling resolved after stopping Norvasc.    When she was seen back here for her last visit in 06/2023, she was noting some very slight new onset dyspnea with activity that she was attributing to extreme heat, and other factors. This has been present for a few months, and not progressively worsening. Her echocardiogram done that morning was reviewed, and showed normal LVEF with moderate to severe aortic stenosis, and mild aortic insufficiency. We  discussed the fact that the symptoms worse that day could represent early onset of symptoms from this valvular disease. We planned on seeing her back for a short-term follow-up in 3 months to determine whether or not she could increase her workload in the meantime. She returns today for that visit.    The patient comes to the clinic today accompanied by an adult male who contributes to her history. She reports that her activity level, energy, and breathing are about the same. She states that if she gets out and does a lot of strenuous activities, she wears out little quicker, but denies it being severe enough to limit her. She reports that it has not increased in severity in the last 3 months or 6 months.  She reports that in the last 1 to 2 weeks, the activities that she would consider as strenuous include vacuuming, and mopping. She states that she does not have to stop and catch her breath during those activities. She reports that she has more trouble with her back than she her breathing. She denies any pain, tightness, or pressure in the chest when she is vacuuming or mopping. She states that the vacuuming, and mopping are about the same for her to get through those tasks now than it was at the start of summer. She reports that when she first lays down at night, if she lays down on her back for 2 to 3 minutes, she has to turns over. She reports that she is still flat in terms of her head, and her ankles are about the same level during those times. [place] and she tried to walk around the area, but her hips made it difficult for her. She reports that when she was walking around the track, she would get a little more out of breath than normal.     The following portions of the patient's history were reviewed and updated as appropriate: allergies, current medications, past family history, past medical history, past social history, past surgical history, and problem  list.    Review of Systems   Constitutional: Negative for malaise/fatigue.   Cardiovascular:  Positive for dyspnea on exertion (unchanged). Negative for chest pain, claudication, leg swelling, near-syncope, orthopnea, palpitations, paroxysmal nocturnal dyspnea and syncope.   Respiratory:  Negative for shortness of breath.    Hematologic/Lymphatic: Does not bruise/bleed easily.           Current Outpatient Medications:     ALPRAZolam (XANAX) 0.25 MG tablet, TAKE 1/2 TO 1 (ONE-HALF TO ONE) TABLET BY MOUTH 1-2 TIMES DAILY AS NEEDED FOR ANXIETY, Disp: , Rfl:     ascorbic acid (VITAMIN C) 500 MG tablet, Take 1 tablet by mouth Daily., Disp: , Rfl:     ASPIRIN 81 PO, Take 81 mg by mouth Daily., Disp: , Rfl:     B Complex-C (VITAMIN B + C COMPLEX PO), Take  by mouth., Disp: , Rfl:     bisoprolol-hydrochlorothiazide (ZIAC) 5-6.25 MG per tablet, Take 1 tablet by mouth Daily., Disp: 90 tablet, Rfl: 3    CALCIUM PO, Take  by mouth., Disp: , Rfl:     Cholecalciferol (D3 ADULT PO), Take  by mouth., Disp: , Rfl:     lisinopril (PRINIVIL,ZESTRIL) 20 MG tablet, Take 1 tablet by mouth Daily., Disp: 90 tablet, Rfl: 3    Multiple Vitamins-Minerals (ZINC PO), Take  by mouth., Disp: , Rfl:     multivitamin with minerals tablet tablet, Take 1 tablet by mouth Daily., Disp: , Rfl:     Omega-3 Fatty Acids (OMEGA-3 FISH OIL PO), Take  by mouth., Disp: , Rfl:     Probiotic Product (PROBIOTIC ADVANCED PO), Take  by mouth., Disp: , Rfl:        Objective:      Vitals:    10/11/23 1352   BP: 130/75   Pulse: 59   SpO2: 99%     Vitals and nursing note reviewed.   Constitutional:       General: Not in acute distress.     Appearance: Not in distress.   Neck:      Vascular: No JVD or JVR. JVD normal.   Pulmonary:      Effort: Pulmonary effort is normal.      Breath sounds: Normal breath sounds.   Cardiovascular:      Normal rate. Regular rhythm.      Murmurs: There is a grade 3/6 harsh midsystolic murmur at the URSB, radiating to the neck.      No  gallop.  No rub.   Pulses:     Intact distal pulses.   Edema:     Peripheral edema absent.   Skin:     General: Skin is warm and dry.   Neurological:      Mental Status: Alert, oriented to person, place, and time and oriented to person, place and time.         Lab Review:         ECG 12 Lead    Date/Time: 10/11/2023 2:06 PM  Performed by: Alexis Randhawa MD    Authorized by: Alexis Randhawa MD  Comparison: compared with previous ECG from 6/30/2023  Comparison to previous ECG: no change.  Rhythm: sinus bradycardia  BPM: 59  Other findings comments: moderate voltage criteria for LVH    Clinical impression: abnormal EKG          We reviewed images from her echo done today in the room, my interpretation: shows preserved LVEF, and severely calcified aortic valve, and measurements of which are essentially unchanged from the 3 months prior. Her aortic valve area still calculates severe, and her maximum velocity is right at the cut off of moderate to severe at 4 m/s. Her mean gradients are in the upper end of moderate in the 35 to 36 mmHg range. Her dimensionless index is more consistent with moderate to severe around 0.28.      Assessment/Plan:     Problem List Items Addressed This Visit (all established and stable)         Cardiac and Vasculature    Bicuspid aortic valve    Hypertension: Well-controlled, continue Ziac and lisinopril to maintain good control    Aortic stenosis, moderate - Primary    Relevant Orders    Adult Transthoracic Echo Complete w/ Color, Spectral and Contrast if necessary per protocol       Assessment/plans:    Her aortic valve does not look different by echocardiographic assessment today, and ventricular function remains preserved today compared to 3 months ago. Her symptoms are very mild at present if due to the valve at all, and seem stable, so we again discussed ad nauseam the natural history and the progression of valvular disease, and the symptoms that would develop as well as reasons and  indications for intervention. For now, we will plan on close surveillance with another repeat clinic visit and echocardiogram in 6 months, though the patient understands to call us sooner if she starts to notice limiting dyspnea in the meantime.      Transcribed from ambient dictation for Alexis Randhawa MD by Tejal Rendon.  10/11/23   18:18 CDT    Patient or patient representative verbalized consent to the visit recording.    I Alexis Randhawa MD have personally performed the services described in this document as scribed by the above individual, and it is both accurate and complete.   I have edited each component as needed.    Alexis Randhawa MD  10/13/2023  23:14 CDT

## 2023-10-17 LAB
AORTIC DIMENSIONLESS INDEX: 0.26 (DI)
ASCENDING AORTA: 4.1 CM
BH CV ECHO MEAS - AO MAX PG: 65.6 MMHG
BH CV ECHO MEAS - AO MEAN PG: 36 MMHG
BH CV ECHO MEAS - AO ROOT DIAM: 3 CM
BH CV ECHO MEAS - AO V2 MAX: 405 CM/SEC
BH CV ECHO MEAS - AO V2 VTI: 99.8 CM
BH CV ECHO MEAS - AVA(I,D): 0.86 CM2
BH CV ECHO MEAS - EDV(CUBED): 71 ML
BH CV ECHO MEAS - EDV(MOD-SP4): 57.8 ML
BH CV ECHO MEAS - EF(MOD-SP4): 61.4 %
BH CV ECHO MEAS - ESV(CUBED): 11.2 ML
BH CV ECHO MEAS - ESV(MOD-SP4): 22.3 ML
BH CV ECHO MEAS - FS: 45.9 %
BH CV ECHO MEAS - IVS/LVPW: 1.1 CM
BH CV ECHO MEAS - IVSD: 1.1 CM
BH CV ECHO MEAS - LA DIMENSION: 3.8 CM
BH CV ECHO MEAS - LAT PEAK E' VEL: 5.2 CM/SEC
BH CV ECHO MEAS - LV DIASTOLIC VOL/BSA (35-75): 32.8 CM2
BH CV ECHO MEAS - LV MASS(C)D: 143.7 GRAMS
BH CV ECHO MEAS - LV MAX PG: 4.9 MMHG
BH CV ECHO MEAS - LV MEAN PG: 3 MMHG
BH CV ECHO MEAS - LV SYSTOLIC VOL/BSA (12-30): 12.7 CM2
BH CV ECHO MEAS - LV V1 MAX: 111 CM/SEC
BH CV ECHO MEAS - LV V1 VTI: 30.3 CM
BH CV ECHO MEAS - LVIDD: 4.1 CM
BH CV ECHO MEAS - LVIDS: 2.24 CM
BH CV ECHO MEAS - LVOT AREA: 2.8 CM2
BH CV ECHO MEAS - LVOT DIAM: 1.9 CM
BH CV ECHO MEAS - LVPWD: 1 CM
BH CV ECHO MEAS - MED PEAK E' VEL: 4.7 CM/SEC
BH CV ECHO MEAS - MV A MAX VEL: 109 CM/SEC
BH CV ECHO MEAS - MV DEC TIME: 0.21 SEC
BH CV ECHO MEAS - MV E MAX VEL: 101 CM/SEC
BH CV ECHO MEAS - MV E/A: 0.93
BH CV ECHO MEAS - RAP SYSTOLE: 5 MMHG
BH CV ECHO MEAS - RVSP: 38.4 MMHG
BH CV ECHO MEAS - SI(MOD-SP4): 20.2 ML/M2
BH CV ECHO MEAS - SV(LVOT): 85.9 ML
BH CV ECHO MEAS - SV(MOD-SP4): 35.5 ML
BH CV ECHO MEAS - TR MAX PG: 33.4 MMHG
BH CV ECHO MEAS - TR MAX VEL: 289 CM/SEC
BH CV ECHO MEASUREMENTS AVERAGE E/E' RATIO: 20.4
LEFT ATRIUM VOLUME INDEX: 31.9 ML/M2
LEFT ATRIUM VOLUME: 51.3 ML

## 2024-04-10 ENCOUNTER — OFFICE VISIT (OUTPATIENT)
Dept: CARDIOLOGY | Facility: CLINIC | Age: 69
End: 2024-04-10
Payer: MEDICARE

## 2024-04-10 ENCOUNTER — HOSPITAL ENCOUNTER (OUTPATIENT)
Dept: CARDIOLOGY | Facility: HOSPITAL | Age: 69
Discharge: HOME | End: 2024-04-10
Payer: MEDICARE

## 2024-04-10 VITALS
BODY MASS INDEX: 23.63 KG/M2 | OXYGEN SATURATION: 95 % | HEART RATE: 60 BPM | DIASTOLIC BLOOD PRESSURE: 69 MMHG | SYSTOLIC BLOOD PRESSURE: 104 MMHG | WEIGHT: 147 LBS | HEIGHT: 66 IN

## 2024-04-10 VITALS
SYSTOLIC BLOOD PRESSURE: 130 MMHG | HEIGHT: 66 IN | DIASTOLIC BLOOD PRESSURE: 75 MMHG | WEIGHT: 153 LBS | BODY MASS INDEX: 24.59 KG/M2

## 2024-04-10 DIAGNOSIS — I35.0 AORTIC STENOSIS, MODERATE: Primary | ICD-10-CM

## 2024-04-10 DIAGNOSIS — Q23.1 BICUSPID AORTIC VALVE: ICD-10-CM

## 2024-04-10 DIAGNOSIS — I10 PRIMARY HYPERTENSION: ICD-10-CM

## 2024-04-10 DIAGNOSIS — I35.0 AORTIC STENOSIS, MODERATE: ICD-10-CM

## 2024-04-10 LAB
BH CV ECHO MEAS - AO MAX PG: 55.4 MMHG
BH CV ECHO MEAS - AO MEAN PG: 35 MMHG
BH CV ECHO MEAS - AO ROOT DIAM: 2.7 CM
BH CV ECHO MEAS - AO V2 MAX: 372 CM/SEC
BH CV ECHO MEAS - AO V2 VTI: 107 CM
BH CV ECHO MEAS - AVA(I,D): 0.75 CM2
BH CV ECHO MEAS - EDV(CUBED): 54.9 ML
BH CV ECHO MEAS - EDV(MOD-SP4): 53 ML
BH CV ECHO MEAS - EF(MOD-SP4): 62.3 %
BH CV ECHO MEAS - ESV(CUBED): 19.7 ML
BH CV ECHO MEAS - ESV(MOD-SP4): 20 ML
BH CV ECHO MEAS - FS: 28.9 %
BH CV ECHO MEAS - IVS/LVPW: 1 CM
BH CV ECHO MEAS - IVSD: 1.1 CM
BH CV ECHO MEAS - LA DIMENSION: 3.2 CM
BH CV ECHO MEAS - LV DIASTOLIC VOL/BSA (35-75): 29.7 CM2
BH CV ECHO MEAS - LV MASS(C)D: 134.7 GRAMS
BH CV ECHO MEAS - LV MAX PG: 3.4 MMHG
BH CV ECHO MEAS - LV MEAN PG: 2 MMHG
BH CV ECHO MEAS - LV SYSTOLIC VOL/BSA (12-30): 11.2 CM2
BH CV ECHO MEAS - LV V1 MAX: 91.7 CM/SEC
BH CV ECHO MEAS - LV V1 VTI: 25.7 CM
BH CV ECHO MEAS - LVIDD: 3.8 CM
BH CV ECHO MEAS - LVIDS: 2.7 CM
BH CV ECHO MEAS - LVOT AREA: 3.1 CM2
BH CV ECHO MEAS - LVOT DIAM: 2 CM
BH CV ECHO MEAS - LVPWD: 1.1 CM
BH CV ECHO MEAS - MV A MAX VEL: 117 CM/SEC
BH CV ECHO MEAS - MV DEC TIME: 0.26 SEC
BH CV ECHO MEAS - MV E MAX VEL: 65.2 CM/SEC
BH CV ECHO MEAS - MV E/A: 0.56
BH CV ECHO MEAS - PA V2 MAX: 74.4 CM/SEC
BH CV ECHO MEAS - RAP SYSTOLE: 10 MMHG
BH CV ECHO MEAS - RVSP: 33.2 MMHG
BH CV ECHO MEAS - SI(MOD-SP4): 18.5 ML/M2
BH CV ECHO MEAS - SV(LVOT): 80.7 ML
BH CV ECHO MEAS - SV(MOD-SP4): 33 ML
BH CV ECHO MEAS - TR MAX PG: 23.2 MMHG
BH CV ECHO MEAS - TR MAX VEL: 241 CM/SEC

## 2024-04-10 PROCEDURE — 3078F DIAST BP <80 MM HG: CPT | Performed by: INTERNAL MEDICINE

## 2024-04-10 PROCEDURE — 93000 ELECTROCARDIOGRAM COMPLETE: CPT | Performed by: INTERNAL MEDICINE

## 2024-04-10 PROCEDURE — 99214 OFFICE O/P EST MOD 30 MIN: CPT | Performed by: INTERNAL MEDICINE

## 2024-04-10 PROCEDURE — 93306 TTE W/DOPPLER COMPLETE: CPT

## 2024-04-10 PROCEDURE — 1160F RVW MEDS BY RX/DR IN RCRD: CPT | Performed by: INTERNAL MEDICINE

## 2024-04-10 PROCEDURE — 3074F SYST BP LT 130 MM HG: CPT | Performed by: INTERNAL MEDICINE

## 2024-04-10 PROCEDURE — 1159F MED LIST DOCD IN RCRD: CPT | Performed by: INTERNAL MEDICINE

## 2024-04-10 RX ORDER — MECOBALAMIN 5000 MCG
15 TABLET,DISINTEGRATING ORAL DAILY
COMMUNITY

## 2024-04-10 NOTE — PROGRESS NOTES
"     Subjective:     Encounter Date:04/10/2024      Patient ID: Mary Salazar is a 68 y.o. female.    Chief Complaint: follow-up valvular heart disease.     History of Present Illness    Mrs. Salazar is a 68-year-old female who returns today after having last been seen here 10/11/2023. She had an echocardiogram done that day which essentially showed aortic stenosis of unchanged severity compared to the previous echo 3 months prior. Her aortic valve area calculated as severe, and her maximal velocity was just at the cut off of moderate to severe at 4 m/s. Her mean gradients were in the upper end of the moderate range at 35 to 36 mmHg and, her dimensionless index was 0.28 consistent with moderate to severe. She returns today for reassessment of the above. She is accompanied by an adult male.    Today, she states that she is doing well. The patient reports a general sense of well-being and is able to perform her desired activities. However, she experiences fatigue during household chores, necessitating rest after approximately 4 to 5 rooms. During her last visit in 10/2023, she reported no limitations in her activities. However, after walking, she noticed increased breathlessness. She attributes this to her advancing age, but her hip pain is more severe than her respiratory symptoms. She denies experiencing any new symptoms such as pressure, tightness, or pain. She has procured a grounding mat to aid sleep. She states that \"she now sleeps like a rock\".     The following portions of the patient's history were reviewed and updated as appropriate: allergies, current medications, past family history, past medical history, past social history, past surgical history, and problem list.    Review of Systems   Constitutional: Negative for malaise/fatigue.   Cardiovascular:  Negative for chest pain, claudication, dyspnea on exertion, leg swelling, near-syncope, orthopnea, palpitations, paroxysmal nocturnal dyspnea and syncope. "   Respiratory:  Negative for shortness of breath.    Hematologic/Lymphatic: Does not bruise/bleed easily.         Current Outpatient Medications:     ALPRAZolam (XANAX) 0.25 MG tablet, TAKE 1/2 TO 1 (ONE-HALF TO ONE) TABLET BY MOUTH 1-2 TIMES DAILY AS NEEDED FOR ANXIETY, Disp: , Rfl:     ascorbic acid (VITAMIN C) 500 MG tablet, Take 1 tablet by mouth Daily., Disp: , Rfl:     ASPIRIN 81 PO, Take 81 mg by mouth Daily., Disp: , Rfl:     B Complex-C (VITAMIN B + C COMPLEX PO), Take  by mouth., Disp: , Rfl:     bisoprolol-hydrochlorothiazide (ZIAC) 5-6.25 MG per tablet, Take 1 tablet by mouth Daily., Disp: 90 tablet, Rfl: 3    CALCIUM PO, Take  by mouth., Disp: , Rfl:     Cholecalciferol (D3 ADULT PO), Take  by mouth., Disp: , Rfl:     lansoprazole (PREVACID) 15 MG capsule, Take 1 capsule by mouth Daily., Disp: , Rfl:     lisinopril (PRINIVIL,ZESTRIL) 20 MG tablet, Take 1 tablet by mouth Daily., Disp: 90 tablet, Rfl: 3    Multiple Vitamins-Minerals (ZINC PO), Take  by mouth., Disp: , Rfl:     multivitamin with minerals tablet tablet, Take 1 tablet by mouth Daily., Disp: , Rfl:     Omega-3 Fatty Acids (OMEGA-3 FISH OIL PO), Take  by mouth., Disp: , Rfl:     Probiotic Product (PROBIOTIC ADVANCED PO), Take  by mouth., Disp: , Rfl:        Objective:      Vitals:    04/10/24 1323   BP: 104/69   Pulse: 60   SpO2: 95%     Vitals and nursing note reviewed.   Constitutional:       General: Not in acute distress.     Appearance: Not in distress.   Neck:      Vascular: No JVD or JVR. JVD normal.   Pulmonary:      Effort: Pulmonary effort is normal.      Breath sounds: Normal breath sounds.   Cardiovascular:      Normal rate. Regular rhythm.      Murmurs: There is a grade 3/6 harsh midsystolic murmur at the URSB, radiating to the neck.  S2 is crisp and audible.      No gallop.  No rub.   Pulses:     Intact distal pulses.   Edema:     Peripheral edema absent.   Skin:     General: Skin is warm and dry.   Neurological:      Mental  Status: Alert, oriented to person, place, and time and oriented to person, place and time.     Lab Review:         ECG 12 Lead    Date/Time: 4/10/2024 1:32 PM  Performed by: Alexis Randhawa MD    Authorized by: Alexis Randhawa MD  Comparison: compared with previous ECG from 10/11/2023  Comparison to previous ECG: No change.   Rhythm: sinus rhythm  BPM: 60  Other findings: left ventricular hypertrophy and poor R wave progression    Clinical impression: abnormal EKG        Assessment/Plan:     Problem List Items Addressed This Visit (all established and stable)         Cardiac and Vasculature    Bicuspid aortic valve    Hypertension    Aortic stenosis, moderate - Primary    Relevant Orders    Adult Transthoracic Echo Complete w/ Color, Spectral and Contrast if necessary per protocol         Recommendations/plans:    Mrs. Salazar remains stable today with her echocardiogram demonstrating no significant progression of aortic stenosis. She remains asymptomatic. We will plan for a follow-up visit and another echocardiogram in 6 months. She knows to call us back sooner if she starts to have any onset of symptoms that could be attributable to aortic stenosis as we did go back over those symptoms and the natural history and progression of the disease again today.    Her blood pressure remains well-controlled, so continue current regimen which includes lisinopril 20 mg daily, and Ziac 5/6.25 daily.    Transcribed from ambient dictation for Alexis Randhawa MD by Elmira Coleman.  04/10/24   15:36 CDT    Patient or patient representative verbalized consent to the visit recording.  I Alexis Randhawa MD have personally performed the services described in this document as scribed by the above individual, and it is both accurate and complete.   I have edited each component as needed.    Alexis Randhawa MD  4/14/2024  23:03 CDT

## 2024-04-17 LAB
AORTIC DIMENSIONLESS INDEX: 0.27 (DI)
ASCENDING AORTA: 4.6 CM
BH CV ECHO MEAS - AO MAX PG: 55.4 MMHG
BH CV ECHO MEAS - AO MEAN PG: 35 MMHG
BH CV ECHO MEAS - AO ROOT DIAM: 2.7 CM
BH CV ECHO MEAS - AO V2 MAX: 372 CM/SEC
BH CV ECHO MEAS - AO V2 VTI: 107 CM
BH CV ECHO MEAS - AVA(I,D): 0.75 CM2
BH CV ECHO MEAS - EDV(CUBED): 54.9 ML
BH CV ECHO MEAS - EDV(MOD-SP4): 53 ML
BH CV ECHO MEAS - EF(MOD-SP4): 62.3 %
BH CV ECHO MEAS - ESV(CUBED): 19.7 ML
BH CV ECHO MEAS - ESV(MOD-SP4): 20 ML
BH CV ECHO MEAS - FS: 28.9 %
BH CV ECHO MEAS - IVS/LVPW: 1 CM
BH CV ECHO MEAS - IVSD: 1.1 CM
BH CV ECHO MEAS - LA DIMENSION: 3.2 CM
BH CV ECHO MEAS - LV DIASTOLIC VOL/BSA (35-75): 29.7 CM2
BH CV ECHO MEAS - LV MASS(C)D: 134.7 GRAMS
BH CV ECHO MEAS - LV MAX PG: 3.4 MMHG
BH CV ECHO MEAS - LV MEAN PG: 2 MMHG
BH CV ECHO MEAS - LV SYSTOLIC VOL/BSA (12-30): 11.2 CM2
BH CV ECHO MEAS - LV V1 MAX: 91.7 CM/SEC
BH CV ECHO MEAS - LV V1 VTI: 25.7 CM
BH CV ECHO MEAS - LVIDD: 3.8 CM
BH CV ECHO MEAS - LVIDS: 2.7 CM
BH CV ECHO MEAS - LVOT AREA: 3.1 CM2
BH CV ECHO MEAS - LVOT DIAM: 2 CM
BH CV ECHO MEAS - LVPWD: 1.1 CM
BH CV ECHO MEAS - MV A MAX VEL: 117 CM/SEC
BH CV ECHO MEAS - MV DEC TIME: 0.26 SEC
BH CV ECHO MEAS - MV E MAX VEL: 65.2 CM/SEC
BH CV ECHO MEAS - MV E/A: 0.56
BH CV ECHO MEAS - PA V2 MAX: 74.4 CM/SEC
BH CV ECHO MEAS - RAP SYSTOLE: 10 MMHG
BH CV ECHO MEAS - RVSP: 33.2 MMHG
BH CV ECHO MEAS - SI(MOD-SP4): 18.5 ML/M2
BH CV ECHO MEAS - SV(LVOT): 80.7 ML
BH CV ECHO MEAS - SV(MOD-SP4): 33 ML
BH CV ECHO MEAS - TR MAX PG: 23.2 MMHG
BH CV ECHO MEAS - TR MAX VEL: 241 CM/SEC

## 2024-06-05 ENCOUNTER — TELEPHONE (OUTPATIENT)
Dept: CARDIOLOGY | Facility: CLINIC | Age: 69
End: 2024-06-05
Payer: COMMERCIAL

## 2024-06-05 ENCOUNTER — HOSPITAL ENCOUNTER (EMERGENCY)
Facility: HOSPITAL | Age: 69
Discharge: HOME | End: 2024-06-05
Attending: FAMILY MEDICINE | Admitting: FAMILY MEDICINE
Payer: MEDICARE

## 2024-06-05 ENCOUNTER — APPOINTMENT (OUTPATIENT)
Dept: GENERAL RADIOLOGY | Facility: HOSPITAL | Age: 69
End: 2024-06-05
Payer: MEDICARE

## 2024-06-05 ENCOUNTER — TELEPHONE (OUTPATIENT)
Dept: CARDIOLOGY | Facility: CLINIC | Age: 69
End: 2024-06-05

## 2024-06-05 VITALS
SYSTOLIC BLOOD PRESSURE: 137 MMHG | HEIGHT: 66 IN | HEART RATE: 61 BPM | TEMPERATURE: 98.3 F | WEIGHT: 145.5 LBS | RESPIRATION RATE: 16 BRPM | OXYGEN SATURATION: 100 % | DIASTOLIC BLOOD PRESSURE: 83 MMHG | BODY MASS INDEX: 23.38 KG/M2

## 2024-06-05 DIAGNOSIS — R07.9 CHEST PAIN, UNSPECIFIED TYPE: Primary | ICD-10-CM

## 2024-06-05 LAB
ANION GAP SERPL CALCULATED.3IONS-SCNC: 7 MMOL/L (ref 5–15)
APTT PPP: 25.1 SECONDS (ref 24.5–36)
BASOPHILS # BLD AUTO: 0.02 10*3/MM3 (ref 0–0.2)
BASOPHILS NFR BLD AUTO: 0.4 % (ref 0–1.5)
BUN SERPL-MCNC: 14 MG/DL (ref 8–23)
BUN/CREAT SERPL: 16.7 (ref 7–25)
CALCIUM SPEC-SCNC: 9.4 MG/DL (ref 8.6–10.5)
CHLORIDE SERPL-SCNC: 103 MMOL/L (ref 98–107)
CO2 SERPL-SCNC: 28 MMOL/L (ref 22–29)
CREAT SERPL-MCNC: 0.84 MG/DL (ref 0.57–1)
D DIMER PPP FEU-MCNC: 0.52 MCGFEU/ML (ref 0–0.71)
DEPRECATED RDW RBC AUTO: 40.1 FL (ref 37–54)
EGFRCR SERPLBLD CKD-EPI 2021: 74.4 ML/MIN/1.73
EOSINOPHIL # BLD AUTO: 0.07 10*3/MM3 (ref 0–0.4)
EOSINOPHIL NFR BLD AUTO: 1.3 % (ref 0.3–6.2)
ERYTHROCYTE [DISTWIDTH] IN BLOOD BY AUTOMATED COUNT: 12.1 % (ref 12.3–15.4)
GEN 5 2HR TROPONIN T REFLEX: 7 NG/L
GLUCOSE SERPL-MCNC: 89 MG/DL (ref 65–99)
HCT VFR BLD AUTO: 42.5 % (ref 34–46.6)
HGB BLD-MCNC: 14.4 G/DL (ref 12–15.9)
IMM GRANULOCYTES # BLD AUTO: 0.01 10*3/MM3 (ref 0–0.05)
IMM GRANULOCYTES NFR BLD AUTO: 0.2 % (ref 0–0.5)
INR PPP: 0.95 (ref 0.91–1.09)
LYMPHOCYTES # BLD AUTO: 2.13 10*3/MM3 (ref 0.7–3.1)
LYMPHOCYTES NFR BLD AUTO: 38.2 % (ref 19.6–45.3)
MAGNESIUM SERPL-MCNC: 1.8 MG/DL (ref 1.6–2.4)
MCH RBC QN AUTO: 30.8 PG (ref 26.6–33)
MCHC RBC AUTO-ENTMCNC: 33.9 G/DL (ref 31.5–35.7)
MCV RBC AUTO: 91 FL (ref 79–97)
MONOCYTES # BLD AUTO: 0.63 10*3/MM3 (ref 0.1–0.9)
MONOCYTES NFR BLD AUTO: 11.3 % (ref 5–12)
NEUTROPHILS NFR BLD AUTO: 2.71 10*3/MM3 (ref 1.7–7)
NEUTROPHILS NFR BLD AUTO: 48.6 % (ref 42.7–76)
NRBC BLD AUTO-RTO: 0 /100 WBC (ref 0–0.2)
NT-PROBNP SERPL-MCNC: 124.5 PG/ML (ref 0–900)
PLATELET # BLD AUTO: 233 10*3/MM3 (ref 140–450)
PMV BLD AUTO: 9.9 FL (ref 6–12)
POTASSIUM SERPL-SCNC: 3.8 MMOL/L (ref 3.5–5.2)
PROTHROMBIN TIME: 13.1 SECONDS (ref 11.8–14.8)
RBC # BLD AUTO: 4.67 10*6/MM3 (ref 3.77–5.28)
SODIUM SERPL-SCNC: 138 MMOL/L (ref 136–145)
TROPONIN T DELTA: 0 NG/L
TROPONIN T SERPL HS-MCNC: 7 NG/L
WBC NRBC COR # BLD AUTO: 5.57 10*3/MM3 (ref 3.4–10.8)

## 2024-06-05 PROCEDURE — 93005 ELECTROCARDIOGRAM TRACING: CPT | Performed by: FAMILY MEDICINE

## 2024-06-05 PROCEDURE — 80048 BASIC METABOLIC PNL TOTAL CA: CPT | Performed by: FAMILY MEDICINE

## 2024-06-05 PROCEDURE — 36415 COLL VENOUS BLD VENIPUNCTURE: CPT

## 2024-06-05 PROCEDURE — 83880 ASSAY OF NATRIURETIC PEPTIDE: CPT | Performed by: FAMILY MEDICINE

## 2024-06-05 PROCEDURE — 93005 ELECTROCARDIOGRAM TRACING: CPT

## 2024-06-05 PROCEDURE — 93010 ELECTROCARDIOGRAM REPORT: CPT | Performed by: INTERNAL MEDICINE

## 2024-06-05 PROCEDURE — 84484 ASSAY OF TROPONIN QUANT: CPT | Performed by: FAMILY MEDICINE

## 2024-06-05 PROCEDURE — 83735 ASSAY OF MAGNESIUM: CPT | Performed by: FAMILY MEDICINE

## 2024-06-05 PROCEDURE — 85730 THROMBOPLASTIN TIME PARTIAL: CPT | Performed by: FAMILY MEDICINE

## 2024-06-05 PROCEDURE — 71045 X-RAY EXAM CHEST 1 VIEW: CPT

## 2024-06-05 PROCEDURE — 96374 THER/PROPH/DIAG INJ IV PUSH: CPT

## 2024-06-05 PROCEDURE — 25810000003 SODIUM CHLORIDE 0.9 % SOLUTION: Performed by: FAMILY MEDICINE

## 2024-06-05 PROCEDURE — 85610 PROTHROMBIN TIME: CPT | Performed by: FAMILY MEDICINE

## 2024-06-05 PROCEDURE — 85025 COMPLETE CBC W/AUTO DIFF WBC: CPT | Performed by: FAMILY MEDICINE

## 2024-06-05 PROCEDURE — 99284 EMERGENCY DEPT VISIT MOD MDM: CPT

## 2024-06-05 PROCEDURE — 85379 FIBRIN DEGRADATION QUANT: CPT | Performed by: FAMILY MEDICINE

## 2024-06-05 RX ORDER — FAMOTIDINE 10 MG/ML
20 INJECTION, SOLUTION INTRAVENOUS ONCE
Status: COMPLETED | OUTPATIENT
Start: 2024-06-05 | End: 2024-06-05

## 2024-06-05 RX ORDER — ALUMINA, MAGNESIA, AND SIMETHICONE 2400; 2400; 240 MG/30ML; MG/30ML; MG/30ML
15 SUSPENSION ORAL ONCE
Status: COMPLETED | OUTPATIENT
Start: 2024-06-05 | End: 2024-06-05

## 2024-06-05 RX ORDER — ONDANSETRON 4 MG/1
4 TABLET, ORALLY DISINTEGRATING ORAL EVERY 8 HOURS PRN
Qty: 20 TABLET | Refills: 0 | Status: SHIPPED | OUTPATIENT
Start: 2024-06-05

## 2024-06-05 RX ORDER — SODIUM CHLORIDE 0.9 % (FLUSH) 0.9 %
10 SYRINGE (ML) INJECTION AS NEEDED
Status: DISCONTINUED | OUTPATIENT
Start: 2024-06-05 | End: 2024-06-05 | Stop reason: HOSPADM

## 2024-06-05 RX ADMIN — SODIUM CHLORIDE 1000 ML: 9 INJECTION, SOLUTION INTRAVENOUS at 16:19

## 2024-06-05 RX ADMIN — ALUMINUM HYDROXIDE, MAGNESIUM HYDROXIDE, AND DIMETHICONE 15 ML: 400; 400; 40 SUSPENSION ORAL at 18:18

## 2024-06-05 RX ADMIN — FAMOTIDINE 20 MG: 10 INJECTION INTRAVENOUS at 18:20

## 2024-06-05 NOTE — TELEPHONE ENCOUNTER
Caller: Mary Salazar    Relationship to patient: Self    Best call back number: [phone]     Chief complaint: NEW ONSET OF CHEST TIGHTNESS (ACTIVE), THROWING UP, DIARRHEA    Patient directed to call 911 or go to their nearest emergency room.     Patient verbalized understanding: [x] Yes  [] No  If no, why?    Additional notes:

## 2024-06-05 NOTE — TELEPHONE ENCOUNTER
"Call returned to ensure patient is coming to ER for active CP. Spouse reports \"once she is out of the shower we are leaving to come to ER.\"  "

## 2024-06-05 NOTE — ED PROVIDER NOTES
Subjective   History of Present Illness  69-year-old female presents emergency room with complaints of chest pain.  She states that it is across her entire chest.  Patient states that began this morning.  Patient states that it is easing up send she has been waiting in the waiting room here in the emergency room.  She is describes it as a tightness in her chest.  She has no shortness of breath.  She has no other radiation of the chest pain.  She has no nausea vomiting.  She states that since Sunday she has been having episodes of diarrhea.  She has no abdominal pain.  She has no fevers or chills.  She denies any urinary symptoms.  Patient denies any headache or dizziness.  Patient denies any other symptoms at this time.      Review of Systems   Cardiovascular:  Positive for chest pain.   Gastrointestinal:  Positive for diarrhea.   All other systems reviewed and are negative.      Past Medical History:   Diagnosis Date    Aneurysm     Congenital heart disease     Heart murmur     Heart valve disease     Hypertension        Allergies   Allergen Reactions    Talwin [Pentazocine] Hives    Penicillins Swelling    Sulfa Antibiotics Hives       Past Surgical History:   Procedure Laterality Date    CARDIAC CATHETERIZATION N/A 12/28/2021    Procedure: Left Heart Cath;  Surgeon: Alexis Randhawa MD;  Location:  [facility];  Service: Cardiology;  Laterality: N/A;       Family History   Problem Relation Age of Onset    Heart attack Mother     Heart attack Father     Hypertension Brother     Heart attack Maternal Grandmother     Hypertension Brother        Social History     Socioeconomic History    Marital status:    Tobacco Use    Smoking status: Never    Smokeless tobacco: Never   Vaping Use    Vaping status: Never Used   Substance and Sexual Activity    Alcohol use: Never    Drug use: Never    Sexual activity: Defer           Objective   Physical Exam  Vitals and nursing note reviewed.    Constitutional:       Appearance: She is well-developed.   HENT:      Head: Normocephalic and atraumatic.   Cardiovascular:      Rate and Rhythm: Normal rate and regular rhythm.      Heart sounds: Normal heart sounds.   Pulmonary:      Effort: Pulmonary effort is normal.      Breath sounds: Normal breath sounds.   Abdominal:      General: Bowel sounds are normal.      Palpations: Abdomen is soft.      Tenderness: There is no abdominal tenderness.   Musculoskeletal:      Cervical back: Normal range of motion and neck supple.      Right lower leg: No tenderness. No edema.      Left lower leg: No tenderness. No edema.   Skin:     General: Skin is warm and dry.   Neurological:      General: No focal deficit present.      Mental Status: She is alert and oriented to person, place, and time.   Psychiatric:         Mood and Affect: Mood normal.         Behavior: Behavior normal.         Procedures           ED Course                HEART Score: 4                            Lab Results (last 24 hours)       Procedure Component Value Units Date/Time    CBC & Differential [[id number]]  (Abnormal) Collected: 06/05/24 1612    Specimen: Blood Updated: 06/05/24 1625    Narrative:      The following orders were created for panel order CBC & Differential.  Procedure                               Abnormality         Status                     ---------                               -----------         ------                     CBC Auto Differential[[id number]]        Abnormal            Final result                 Please view results for these tests on the individual orders.    Basic Metabolic Panel [[id number]]  (Normal) Collected: 06/05/24 1612    Specimen: Blood Updated: 06/05/24 1644     Glucose 89 mg/dL      BUN 14 mg/dL      Creatinine 0.84 mg/dL      Sodium 138 mmol/L      Potassium 3.8 mmol/L      Comment: Slight hemolysis detected by analyzer. Result may be falsely elevated.        Chloride 103 mmol/L      CO2 28.0 mmol/L       Calcium 9.4 mg/dL      BUN/Creatinine Ratio 16.7     Anion Gap 7.0 mmol/L      eGFR 74.4 mL/min/1.73     Narrative:      GFR Normal >60  Chronic Kidney Disease <60  Kidney Failure <15    The GFR formula is only valid for adults with stable renal function between ages 18 and 70.    Protime-INR [[id number]]  (Normal) Collected: 06/05/24 1612    Specimen: Blood Updated: 06/05/24 1635     Protime 13.1 Seconds      INR 0.95    aPTT [[id number]]  (Normal) Collected: 06/05/24 1612    Specimen: Blood Updated: 06/05/24 1635     PTT 25.1 seconds     BNP [[id number]]  (Normal) Collected: 06/05/24 1612    Specimen: Blood Updated: 06/05/24 1642     proBNP 124.5 pg/mL     Narrative:      This assay is used as an aid in the diagnosis of individuals suspected of having heart failure. It can be used as an aid in the diagnosis of acute decompensated heart failure (ADHF) in patients presenting with signs and symptoms of ADHF to the emergency department (ED). In addition, NT-proBNP of <300 pg/mL indicates ADHF is not likely.    Age Range Result Interpretation  NT-proBNP Concentration (pg/mL:      <50             Positive            >450                   Gray                 300-450                    Negative             <300    50-75           Positive            >900                  Gray                300-900                  Negative            <300      >75             Positive            >1800                  Gray                300-1800                  Negative            <300    D-dimer, Quantitative [[id number]]  (Normal) Collected: 06/05/24 1612    Specimen: Blood Updated: 06/05/24 1635     D-Dimer, Quantitative 0.52 MCGFEU/mL     Narrative:      According to the assay 's published package insert, a normal (<0.50 MCGFEU/mL) D-dimer result in conjunction with a non-high clinical probability assessment, excludes deep vein thrombosis (DVT) and pulmonary embolism (PE) with high sensitivity.    D-dimer values  "increase with age and this can make VTE exclusion of an older population difficult. To address this, the [employer], based on best available evidence and recent guidelines, recommends that clinicians use age-adjusted D-dimer thresholds in patients greater than 50 years of age with: a) a low probability of PE who do not meet all Pulmonary Embolism Rule Out Criteria, or b) in those with intermediate probability of PE.   The formula for an age-adjusted D-dimer cut-off is \"age/100\".  For example, a 60 year old patient would have an age-adjusted cut-off of 0.60 MCGFEU/mL and an 80 year old 0.80 MCGFEU/mL.    High Sensitivity Troponin T [[id number]]  (Normal) Collected: 06/05/24 1612    Specimen: Blood Updated: 06/05/24 1642     HS Troponin T 7 ng/L     Narrative:      High Sensitive Troponin T Reference Range:  <14.0 ng/L- Negative Female for AMI  <22.0 ng/L- Negative Male for AMI  >=14 - Abnormal Female indicating possible myocardial injury.  >=22 - Abnormal Male indicating possible myocardial injury.   Clinicians would have to utilize clinical acumen, EKG, Troponin, and serial changes to determine if it is an Acute Myocardial Infarction or myocardial injury due to an underlying chronic condition.         Magnesium [[id number]]  (Normal) Collected: 06/05/24 1612    Specimen: Blood Updated: 06/05/24 1641     Magnesium 1.8 mg/dL     CBC Auto Differential [[id number]]  (Abnormal) Collected: 06/05/24 1612    Specimen: Blood Updated: 06/05/24 1625     WBC 5.57 10*3/mm3      RBC 4.67 10*6/mm3      Hemoglobin 14.4 g/dL      Hematocrit 42.5 %      MCV 91.0 fL      MCH 30.8 pg      MCHC 33.9 g/dL      RDW 12.1 %      RDW-SD 40.1 fl      MPV 9.9 fL      Platelets 233 10*3/mm3      Neutrophil % 48.6 %      Lymphocyte % 38.2 %      Monocyte % 11.3 %      Eosinophil % 1.3 %      Basophil % 0.4 %      Immature Grans % 0.2 %      Neutrophils, Absolute 2.71 10*3/mm3      Lymphocytes, Absolute 2.13 10*3/mm3      " Monocytes, Absolute 0.63 10*3/mm3      Eosinophils, Absolute 0.07 10*3/mm3      Basophils, Absolute 0.02 10*3/mm3      Immature Grans, Absolute 0.01 10*3/mm3      nRBC 0.0 /100 WBC     High Sensitivity Troponin T 2Hr [[id number]]  (Normal) Collected: 06/05/24 1817    Specimen: Blood Updated: 06/05/24 1850     HS Troponin T 7 ng/L      Troponin T Delta 0 ng/L     Narrative:      High Sensitive Troponin T Reference Range:  <14.0 ng/L- Negative Female for AMI  <22.0 ng/L- Negative Male for AMI  >=14 - Abnormal Female indicating possible myocardial injury.  >=22 - Abnormal Male indicating possible myocardial injury.   Clinicians would have to utilize clinical acumen, EKG, Troponin, and serial changes to determine if it is an Acute Myocardial Infarction or myocardial injury due to an underlying chronic condition.               XR Chest 1 View   Final Result   1.  No acute process in the chest.       This report was signed and finalized on 6/5/2024 4:19 PM by Dr Jose David Bose.            Medications   sodium chloride 0.9 % bolus 1,000 mL (0 mL Intravenous Stopped 6/5/24 1723)   aluminum-magnesium hydroxide-simethicone (MAALOX MAX) 400-400-40 MG/5ML suspension 15 mL (15 mL Oral Given 6/5/24 1818)   famotidine (PEPCID) injection 20 mg (20 mg Intravenous Given 6/5/24 1820)         Medical Decision Making  69-year-old female came to emergency room with chest pain.  Patient did have a heart score of 4.  Patient had a EKG negative for any acute ischemic changes.  Patient had negative troponin levels.  Patient was offered admission.  Patient is declining the admission.  Patient has been advised to follow-up with her primary care provider.  Patient was advised to return to emergency room with new or worsening symptoms.  All questions were answered for the patient prior to discharge.  Patient verbalized understanding was agreeable plan as discussed.    Problems Addressed:  Chest pain, unspecified type: complicated acute illness  or injury    Amount and/or Complexity of Data Reviewed  Labs: ordered. Decision-making details documented in ED Course.  Radiology: ordered. Decision-making details documented in ED Course.  ECG/medicine tests: ordered. Decision-making details documented in ED Course.    Risk  OTC drugs.  Prescription drug management.        Final diagnoses:   Chest pain, unspecified type       ED Disposition  ED Disposition       ED Disposition   Discharge    Condition   Stable    Comment   --               Hazel Mcmahan, APRN  [address] [place] [address] [phone]    Schedule an appointment as soon as possible for a visit       Kirit Tineo MD  [address] [facility] [address]  [phone]    Schedule an appointment as soon as possible for a visit       [facility] EMERGENCY DEPARTMENT  [address] [place] [id number] [phone]    As needed, If symptoms worsen         Medication List        New Prescriptions      ondansetron ODT 4 MG disintegrating tablet  Commonly known as: ZOFRAN-ODT  Place 1 tablet on the tongue Every 8 (Eight) Hours As Needed for Nausea or Vomiting.               Where to Get Your Medications        These medications were sent to [facility] - [place] - [address] - [phone]  - [phone]  [address] [facility] [address]      Phone: [phone]   ondansetron ODT 4 MG disintegrating tablet            Trung Agosto MD  06/06/24 1326

## 2024-06-06 LAB
QT INTERVAL: 382 MS
QTC INTERVAL: 400 MS

## 2024-06-07 RX ORDER — LISINOPRIL 20 MG/1
20 TABLET ORAL DAILY
Qty: 90 TABLET | Refills: 3 | Status: SHIPPED | OUTPATIENT
Start: 2024-06-07

## 2024-06-07 RX ORDER — BISOPROLOL FUMARATE AND HYDROCHLOROTHIAZIDE 5; 6.25 MG/1; MG/1
1 TABLET ORAL DAILY
Qty: 90 TABLET | Refills: 3 | Status: SHIPPED | OUTPATIENT
Start: 2024-06-07

## 2024-08-08 ENCOUNTER — TELEPHONE (OUTPATIENT)
Dept: CARDIOLOGY | Facility: CLINIC | Age: 69
End: 2024-08-08
Payer: COMMERCIAL

## 2024-08-08 NOTE — TELEPHONE ENCOUNTER
"Patient has developed an occasional cough while taking lisinopril and was told by a \"nurse/friend\" she may need to change to a different blood pressure medication. She is advised to reach out to her PCP regarding this. She voiced understanding. Kayy Orona MA    "

## 2024-08-16 RX ORDER — LOSARTAN POTASSIUM 50 MG/1
50 TABLET ORAL DAILY
Qty: 30 TABLET | Refills: 11 | Status: SHIPPED | OUTPATIENT
Start: 2024-08-16

## 2024-12-11 ENCOUNTER — HOSPITAL ENCOUNTER (OUTPATIENT)
Dept: CARDIOLOGY | Facility: HOSPITAL | Age: 69
Discharge: HOME | End: 2024-12-11
Admitting: INTERNAL MEDICINE
Payer: MEDICARE

## 2024-12-11 VITALS
SYSTOLIC BLOOD PRESSURE: 104 MMHG | DIASTOLIC BLOOD PRESSURE: 69 MMHG | BODY MASS INDEX: 23.3 KG/M2 | WEIGHT: 145 LBS | HEIGHT: 66 IN

## 2024-12-11 DIAGNOSIS — I35.0 AORTIC STENOSIS, MODERATE: ICD-10-CM

## 2024-12-11 PROCEDURE — 93306 TTE W/DOPPLER COMPLETE: CPT

## 2024-12-12 ENCOUNTER — TELEPHONE (OUTPATIENT)
Dept: CARDIOLOGY | Facility: CLINIC | Age: 69
End: 2024-12-12
Payer: COMMERCIAL

## 2024-12-12 NOTE — TELEPHONE ENCOUNTER
Pt called and left a  msg on Kayy's phone stating that her appt was cancelled the yesterday with Lenka and she wanted to see Dr. Randhawa not Lenka.  We have rescheduled her with Lenka for 1/13/25.  I called her back and had to leave a detailed  msg stating that I had gone through Dr. Randhawa schedule and there is no appt's available ( I had gotten to the middle of May).  I told her on the  msg to keep the appt with Lenka on 1/13/25 and we can try to make her next fu with Dr. Randhawa after that appt.  I told her to call me back if she did not want to do this.  I can talk to Kayy when she gets back into the office.  John Berg, CMA

## 2024-12-15 LAB
AORTIC DIMENSIONLESS INDEX: 0.24 (DI)
BH CV ECHO MEAS - AI P1/2T: 974.4 MSEC
BH CV ECHO MEAS - AO MAX PG: 55.7 MMHG
BH CV ECHO MEAS - AO MEAN PG: 42 MMHG
BH CV ECHO MEAS - AO ROOT DIAM: 3.2 CM
BH CV ECHO MEAS - AO V2 MAX: 410 CM/SEC
BH CV ECHO MEAS - AO V2 VTI: 112.6 CM
BH CV ECHO MEAS - AVA(I,D): 0.88 CM2
BH CV ECHO MEAS - EDV(CUBED): 50.1 ML
BH CV ECHO MEAS - EDV(MOD-SP2): 51.5 ML
BH CV ECHO MEAS - EDV(MOD-SP4): 61.9 ML
BH CV ECHO MEAS - EF(MOD-SP2): 56.4 %
BH CV ECHO MEAS - EF(MOD-SP4): 60.9 %
BH CV ECHO MEAS - ESV(CUBED): 15.5 ML
BH CV ECHO MEAS - ESV(MOD-SP2): 22.5 ML
BH CV ECHO MEAS - ESV(MOD-SP4): 24.2 ML
BH CV ECHO MEAS - FS: 32.3 %
BH CV ECHO MEAS - IVS/LVPW: 0.89 CM
BH CV ECHO MEAS - IVSD: 1.04 CM
BH CV ECHO MEAS - LV DIASTOLIC VOL/BSA (35-75): 35.5 CM2
BH CV ECHO MEAS - LV MASS(C)D: 128.8 GRAMS
BH CV ECHO MEAS - LV MAX PG: 3.8 MMHG
BH CV ECHO MEAS - LV MEAN PG: 2.21 MMHG
BH CV ECHO MEAS - LV SYSTOLIC VOL/BSA (12-30): 13.9 CM2
BH CV ECHO MEAS - LV V1 VTI: 27.6 CM
BH CV ECHO MEAS - LVIDD: 3.7 CM
BH CV ECHO MEAS - LVIDS: 2.5 CM
BH CV ECHO MEAS - LVOT AREA: 3.2 CM2
BH CV ECHO MEAS - LVOT DIAM: 2.01 CM
BH CV ECHO MEAS - LVPWD: 1.16 CM
BH CV ECHO MEAS - MR MAX PG: 72.4 MMHG
BH CV ECHO MEAS - MR MAX VEL: 425.3 CM/SEC
BH CV ECHO MEAS - MV A MAX VEL: 114 CM/SEC
BH CV ECHO MEAS - MV DEC SLOPE: 294.4 CM/SEC2
BH CV ECHO MEAS - MV DEC TIME: 0.26 SEC
BH CV ECHO MEAS - MV E MAX VEL: 74.2 CM/SEC
BH CV ECHO MEAS - MV E/A: 0.65
BH CV ECHO MEAS - MV MAX PG: 5.5 MMHG
BH CV ECHO MEAS - MV MEAN PG: 1.45 MMHG
BH CV ECHO MEAS - MV V2 VTI: 41.9 CM
BH CV ECHO MEAS - MVA(VTI): 2.09 CM2
BH CV ECHO MEAS - PA V2 MAX: 85.5 CM/SEC
BH CV ECHO MEAS - RAP SYSTOLE: 8 MMHG
BH CV ECHO MEAS - RVSP: 30.1 MMHG
BH CV ECHO MEAS - SV(LVOT): 87.6 ML
BH CV ECHO MEAS - SV(MOD-SP2): 29 ML
BH CV ECHO MEAS - SV(MOD-SP4): 37.7 ML
BH CV ECHO MEAS - SVI(LVOT): 50.2 ML/M2
BH CV ECHO MEAS - SVI(MOD-SP2): 16.6 ML/M2
BH CV ECHO MEAS - SVI(MOD-SP4): 21.6 ML/M2
BH CV ECHO MEAS - TR MAX PG: 22.1 MMHG
BH CV ECHO MEAS - TR MAX VEL: 233.2 CM/SEC

## 2025-01-06 ENCOUNTER — OFFICE VISIT (OUTPATIENT)
Dept: CARDIOLOGY | Facility: CLINIC | Age: 70
End: 2025-01-06
Payer: MEDICARE

## 2025-01-06 VITALS
SYSTOLIC BLOOD PRESSURE: 120 MMHG | HEART RATE: 69 BPM | OXYGEN SATURATION: 97 % | HEIGHT: 66 IN | DIASTOLIC BLOOD PRESSURE: 84 MMHG | BODY MASS INDEX: 24.43 KG/M2 | WEIGHT: 152 LBS

## 2025-01-06 DIAGNOSIS — Q23.81 BICUSPID AORTIC VALVE: ICD-10-CM

## 2025-01-06 DIAGNOSIS — I10 PRIMARY HYPERTENSION: ICD-10-CM

## 2025-01-06 DIAGNOSIS — I35.0 AORTIC STENOSIS, SEVERE: Primary | ICD-10-CM

## 2025-01-06 NOTE — PROGRESS NOTES
Subjective:     Encounter Date:01/06/2025      Patient ID: Mary Salazar is a 69 y.o. female.    Chief Complaint: f/u aortic valve disease  History of Present Illness  69-year-old female returns today for routine follow-up of aortic valve disease.  I last saw her in the office on 4/10/2024, at which point most recently available surveillance echocardiography had quantitative measurements consistent with moderate to severe aortic stenosis.  At that time, she reported she was doing well was able to perform all of her desired activities.  She was noting some fatigue after doing household chores, having to stop and rest after cleaning about 4-5 rooms.  She did endorse some increased breathlessness after walking but she attributed this to her advancing age and stated that hip pain was more severe than her shortness of breath.  Plan then was for another echocardiogram and a visit in 6 months.  She ended up presenting to emergency department about 2 months later, June 5, 2024, no chest pain.  She had serial negative troponins and was offered admission by ED provider for further workup, but she has to leave.  We then had a telephone message from her a few months later, in August 2024, reporting a cough that a nursing friend thought maybe was due to her ACE inhibitor.  I did change her at that time to losartan.  She was due to then see us for routine visit in December but that got canceled and a new appointment was made today to discuss her recent surveillance echocardiogram, which was done on 12/11/2024 and showed preserved LVEF but progression of valvular stenosis with quantitative parameters all consistent with severe stenosis.  She also had mild regurgitation.    Today,  she reports experiencing dyspnea and chest discomfort, which she describes as a squeezing sensation in the center of her chest, radiating to her left arm. This discomfort, distinct from the episode in June 2024, began approximately one month ago and  has been progressively worsening. She also experiences pain between her shoulder blades and neck. The chest pressure is constant, exacerbated by physical activity, and somewhat alleviated by rest and deep breathing. She initiated a workout regimen at the wellness center in [place] last month, focusing on balance, breathing, and mild cardio exercises, which she tolerates well. She also reports postprandial discomfort and bloating, even after consuming small meals. She has been on Prevacid daily. She reports no presyncope but mentions occasional dizziness while showering. She also reports occasional neck tightness, which she manages by rubbing the area. The chest pressure is exacerbated by physical activity and somewhat alleviated by rest and deep breathing.  She has a known history of arthritis and questions if her back pain could be related to this condition.    She has been experiencing ankle edema for the past two weeks, which worsens as the day progresses and resolves by morning. She has a long-standing inability to sleep in the supine position due to worsening breathing.    FAMILY HISTORY  Her mother had 2 aneurysms. Her parents lived to 90 and 89 years old respectively.          The following portions of the patient's history were reviewed and updated as appropriate: allergies, current medications, past family history, past medical history, past social history, past surgical history, and problem list.    Review of Systems   Constitutional: Negative for malaise/fatigue.   Cardiovascular:  Positive for chest pain, dyspnea on exertion, leg swelling and orthopnea. Negative for claudication, near-syncope, palpitations, paroxysmal nocturnal dyspnea and syncope.   Respiratory:  Positive for shortness of breath.    Hematologic/Lymphatic: Does not bruise/bleed easily.           Current Outpatient Medications:     ALPRAZolam (XANAX) 0.25 MG tablet, TAKE 1/2 TO 1 (ONE-HALF TO ONE) TABLET BY MOUTH 1-2 TIMES DAILY AS NEEDED  FOR ANXIETY, Disp: , Rfl:     ascorbic acid (VITAMIN C) 500 MG tablet, Take 1 tablet by mouth Daily., Disp: , Rfl:     ASPIRIN 81 PO, Take 81 mg by mouth Daily., Disp: , Rfl:     B Complex-C (VITAMIN B + C COMPLEX PO), Take  by mouth., Disp: , Rfl:     bisoprolol-hydrochlorothiazide (ZIAC) 5-6.25 MG per tablet, Take 1 tablet by mouth once daily, Disp: 90 tablet, Rfl: 3    CALCIUM PO, Take  by mouth., Disp: , Rfl:     Cholecalciferol (D3 ADULT PO), Take  by mouth., Disp: , Rfl:     lansoprazole (PREVACID) 15 MG capsule, Take 1 capsule by mouth Daily., Disp: , Rfl:     losartan (Cozaar) 50 MG tablet, Take 1 tablet by mouth Daily., Disp: 30 tablet, Rfl: 11    Multiple Vitamins-Minerals (ZINC PO), Take  by mouth., Disp: , Rfl:     multivitamin with minerals tablet tablet, Take 1 tablet by mouth Daily., Disp: , Rfl:     Omega-3 Fatty Acids (OMEGA-3 FISH OIL PO), Take  by mouth., Disp: , Rfl:     ondansetron ODT (ZOFRAN-ODT) 4 MG disintegrating tablet, Place 1 tablet on the tongue Every 8 (Eight) Hours As Needed for Nausea or Vomiting., Disp: 20 tablet, Rfl: 0    Probiotic Product (PROBIOTIC ADVANCED PO), Take  by mouth., Disp: , Rfl:        Objective:      Vitals:    01/06/25 1353   BP: 120/84   Pulse: 69   SpO2: 97%     Vitals and nursing note reviewed.   Constitutional:       General: Not in acute distress.     Appearance: Not in distress.   Neck:      Vascular: No JVD or JVR. JVD normal.   Pulmonary:      Effort: Pulmonary effort is normal.      Breath sounds: Normal breath sounds.   Cardiovascular:      Normal rate. Regular rhythm.      Murmurs: There is a grade 3/6 harsh midsystolic murmur at the URSB, radiating to the neck. S2 not audible over aortic position      No gallop.  No rub.   Pulses:     Intact distal pulses.   Edema:     Peripheral edema absent.   Skin:     General: Skin is warm and dry.   Neurological:      Mental Status: Alert, oriented to person, place, and time and oriented to person, place and time.        Physical Exam      Lab Review:         ECG 12 Lead    Date/Time: 1/6/2025 2:16 PM  Performed by: Alexis Randhawa MD    Authorized by: Alexis Randhawa MD  Comparison: compared with previous ECG from 6/5/2024  Comparison to previous ECG: Criteria for LVH are no longer present  Rhythm: sinus rhythm  BPM: 67  Other findings: poor R wave progression  Clinical impression comment: Borderline ECG        Results        Results for orders placed during the hospital encounter of 12/11/24    Adult Transthoracic Echo Complete w/ Color, Spectral and Contrast if necessary per protocol    Interpretation Summary    Left ventricular systolic function is normal. Left ventricular ejection fraction appears to be 56 - 60%.    Severe aortic valve stenosis is present (V-max 4.1 m/s, mean gradient 42 mmHg, dimensionless index 0.24, MAURICIO 0.86 cm²), with mild aortic regurgitation.    Left ventricular wall thickness is consistent with mild concentric hypertrophy.    Left ventricular diastolic function is consistent with (grade Ia w/high LAP) impaired relaxation.    Estimated right ventricular systolic pressure from tricuspid regurgitation is normal (<35 mmHg).    Normal size and function of the right ventricle.    No other significant (worse than mild) valvular pathology.    Compared to prior exam from 4/10/2024, quantitative measures of aortic stenosis are now consistent with severe aortic stenosis.        Assessment/Plan:     Problem List Items Addressed This Visit (all established and stable, except for otherwise noted)         Cardiac and Vasculature    Bicuspid aortic valve    Hypertension    Aortic stenosis, severe - Primary: Worsening         Recommendations/plans:    Assessment & Plan    Recent echocardiography indicates progression to severe aortic stenosis. She reports new symptoms, including chest discomfort, breathlessness, and swelling in her ankles, which have been present for about a month, which I do believe now  represent symptomatic severe aortic stenosis, and thus have recommended we proceed with necessary workup and evaluation for valvular intervention.    A detailed discussion was held regarding the potential need for valvular intervention, including the differences between surgical aortic valve replacement (SAVR) and transcatheter aortic valve replacement (TAVR). Given her age of 69 and overall good health, coupled with impressive longevity in her family (parents lived to 90 and 89), both options are being considered.     We spent the bulk of today's visit discussing the anatomy of aortic valvular disease, as well as the anatomy involved and the physiologic impact of severe aortic stenosis.  We discussed the natural history of aortic stenosis (including the 50% mortality rate at 2 years for patients with symptomatic severe AS), indications for valvular intervention, as well as work-up that is necessary to help decide preferred modality of valvular intervention (SAVR versus TAVR).  I also emphasized the multidisciplinary nature of the [facility] Structural Heart team assessment and evaluation of patients with valvular heart disease, including our weekly valve conference.    After a long and thorough conversation with the patient and her , the patient would like to be considered for treatment options (particularly with recent development of symptoms).     As such, I think a CTA to fully evaluate not only the valve size but peripheral vascular access options is reasonable (of note, her valve has previously said, by providers at outlying institutions, to be bicuspid; that has not been clear here but will be better visualized on the CT scan, and that determination certainly would factor heavily into recommended modality of intervention), particularly given the distance she travels to come here, we will attempt to obtain this on the same day as evaluation with one of our CT surgeons.  We will review her  case at our next [facility] Structural Heart Valve Committee meeting as well, and continue to do so until final recommendation and decision has been made regarding preferred modality of valvular intervention.    Of note, frailty testing was performed today by, and the patient and her son received education materials from, Ara Gallo RN (structural heart coordinator).      46 minutes spent on day of service, including time spent reviewing diagnostic imaging, evaluating patient, discussing changes on echocardiogram recently noted, and laying out full scope of evaluation and decision-making regarding modality of valvular intervention    Alexis Randhawa MD  01/06/2025  14:23 CST    Transcribed from ambient dictation for Alexis Randhawa MD by Alexis Randhawa MD.  01/06/25   14:16 CST    Patient or patient representative verbalized consent to the visit recording.

## 2025-01-13 DIAGNOSIS — I35.0 AORTIC STENOSIS, SEVERE: Primary | ICD-10-CM

## 2025-01-14 ENCOUNTER — TELEPHONE (OUTPATIENT)
Dept: CARDIOLOGY | Facility: CLINIC | Age: 70
End: 2025-01-14
Payer: COMMERCIAL

## 2025-01-14 NOTE — TELEPHONE ENCOUNTER
Call to the pt's dental office (Dr. Lenka Feliciano, [facility]) to request a clearance letter. They will fax something over.

## 2025-01-29 ENCOUNTER — HOSPITAL ENCOUNTER (OUTPATIENT)
Dept: CT IMAGING | Facility: HOSPITAL | Age: 70
Discharge: HOME | End: 2025-01-29
Admitting: INTERNAL MEDICINE
Payer: MEDICARE

## 2025-01-29 ENCOUNTER — OFFICE VISIT (OUTPATIENT)
Dept: CARDIAC SURGERY | Facility: CLINIC | Age: 70
End: 2025-01-29
Payer: MEDICARE

## 2025-01-29 VITALS
WEIGHT: 152.6 LBS | HEIGHT: 66 IN | SYSTOLIC BLOOD PRESSURE: 134 MMHG | OXYGEN SATURATION: 91 % | BODY MASS INDEX: 24.53 KG/M2 | HEART RATE: 53 BPM | DIASTOLIC BLOOD PRESSURE: 86 MMHG

## 2025-01-29 DIAGNOSIS — I35.0 AORTIC STENOSIS, SEVERE: ICD-10-CM

## 2025-01-29 DIAGNOSIS — I71.21 ANEURYSM OF ASCENDING AORTA WITHOUT RUPTURE: ICD-10-CM

## 2025-01-29 DIAGNOSIS — I35.0 AORTIC STENOSIS, SEVERE: Primary | ICD-10-CM

## 2025-01-29 DIAGNOSIS — Q23.81 BICUSPID AORTIC VALVE: ICD-10-CM

## 2025-01-29 PROCEDURE — 99204 OFFICE O/P NEW MOD 45 MIN: CPT | Performed by: THORACIC SURGERY (CARDIOTHORACIC VASCULAR SURGERY)

## 2025-01-29 PROCEDURE — 1160F RVW MEDS BY RX/DR IN RCRD: CPT | Performed by: THORACIC SURGERY (CARDIOTHORACIC VASCULAR SURGERY)

## 2025-01-29 PROCEDURE — 74174 CTA ABD&PLVS W/CONTRAST: CPT

## 2025-01-29 PROCEDURE — 71275 CT ANGIOGRAPHY CHEST: CPT

## 2025-01-29 PROCEDURE — 3075F SYST BP GE 130 - 139MM HG: CPT | Performed by: THORACIC SURGERY (CARDIOTHORACIC VASCULAR SURGERY)

## 2025-01-29 PROCEDURE — 1159F MED LIST DOCD IN RCRD: CPT | Performed by: THORACIC SURGERY (CARDIOTHORACIC VASCULAR SURGERY)

## 2025-01-29 PROCEDURE — 3079F DIAST BP 80-89 MM HG: CPT | Performed by: THORACIC SURGERY (CARDIOTHORACIC VASCULAR SURGERY)

## 2025-01-29 PROCEDURE — 25510000001 IOPAMIDOL PER 1 ML: Performed by: INTERNAL MEDICINE

## 2025-01-29 RX ORDER — IOPAMIDOL 755 MG/ML
100 INJECTION, SOLUTION INTRAVASCULAR
Status: COMPLETED | OUTPATIENT
Start: 2025-01-29 | End: 2025-01-29

## 2025-01-29 RX ADMIN — IOPAMIDOL 100 ML: 755 INJECTION, SOLUTION INTRAVENOUS at 12:15

## 2025-01-31 ENCOUNTER — PREP FOR SURGERY (OUTPATIENT)
Dept: OTHER | Facility: HOSPITAL | Age: 70
End: 2025-01-31
Payer: COMMERCIAL

## 2025-01-31 ENCOUNTER — TELEPHONE (OUTPATIENT)
Dept: CARDIOLOGY | Facility: CLINIC | Age: 70
End: 2025-01-31
Payer: COMMERCIAL

## 2025-01-31 DIAGNOSIS — I35.0 AORTIC STENOSIS, SEVERE: Primary | ICD-10-CM

## 2025-01-31 RX ORDER — SODIUM CHLORIDE 9 MG/ML
40 INJECTION, SOLUTION INTRAVENOUS AS NEEDED
OUTPATIENT
Start: 2025-01-31

## 2025-01-31 RX ORDER — SODIUM CHLORIDE 0.9 % (FLUSH) 0.9 %
10 SYRINGE (ML) INJECTION AS NEEDED
OUTPATIENT
Start: 2025-01-31

## 2025-01-31 RX ORDER — SODIUM CHLORIDE 0.9 % (FLUSH) 0.9 %
10 SYRINGE (ML) INJECTION EVERY 12 HOURS SCHEDULED
OUTPATIENT
Start: 2025-01-31

## 2025-01-31 RX ORDER — ASPIRIN 81 MG/1
81 TABLET ORAL DAILY
OUTPATIENT
Start: 2025-02-01

## 2025-01-31 RX ORDER — ASPIRIN 81 MG/1
324 TABLET, CHEWABLE ORAL ONCE
OUTPATIENT
Start: 2025-01-31 | End: 2025-01-31

## 2025-01-31 NOTE — TELEPHONE ENCOUNTER
Call to the pt to let her know that Dr. Randhawa would like to plan a heart cath for her. Explained the reason why we do a heart cath as part of work up. She verbalized understanding and is agreeable to proceed.

## 2025-02-02 PROBLEM — I71.21 ANEURYSM OF ASCENDING AORTA WITHOUT RUPTURE: Status: ACTIVE | Noted: 2025-02-02

## 2025-02-03 NOTE — PROGRESS NOTES
Subjective   Patient ID: Mary Salazar is a 69 y.o. female who is here for evaluation of aortic valve stenosis.     Chief Complaint   Patient presents with    Severe Aortic Stenosis     New TAVR pt referred from Dr. Randhawa     History of Present Illness  The patient presents for evaluation of aortic valve stenosis.  She is accompanied by her .    She reports experiencing dyspnea, ankle edema, and mild fatigue, which have significantly worsened over the past year. She has not sought medical attention for heart failure.  She expresses concern about potential exposure to influenza, as her daughter-in-law recently contracted the virus, and her grandson is currently infected. She has been avoiding social contact to minimize her risk. She has not received the COVID-19 vaccine. She maintains good oral hygiene, with dental check-ups every 6 months, the most recent being 2 months ago.    She also suffers from severe neuropathy in her feet, which she suspects may be contributing to the swelling. The edema is most pronounced during periods of prolonged sitting, particularly at night. She is able to lie flat without difficulty but prefers not to sleep on her back due to perceived respiratory discomfort. She does not experience episodes of breathlessness at night that require her to sit up.    Supplemental Information  She has a history of hysterectomy.    SOCIAL HISTORY  She is a non-smoker.    FAMILY HISTORY  Her mother had 2 aneurysms, one in her stomach and another unspecified location, and  of a heart attack. Her father had an aneurysm, but the location is unknown. Most of her mother's sisters had heart problems.    IMMUNIZATIONS  She has not received the COVID-19 vaccine.    The following portions of the patient's history were reviewed and updated as appropriate: allergies, current medications, past family history, past medical history, past social history, past surgical history and problem list.    Past Medical  "History:   Diagnosis Date    Aneurysm     Congenital heart disease     Heart murmur     Heart valve disease     Hypertension        Past Surgical History:   Procedure Laterality Date    CARDIAC CATHETERIZATION N/A 12/28/2021    Procedure: Left Heart Cath;  Surgeon: Alexis Randhawa MD;  Location:  [facility];  Service: Cardiology;  Laterality: N/A;       Social History     Socioeconomic History    Marital status:    Tobacco Use    Smoking status: Never     Passive exposure: Past (\"Not long\" about 49 years ago - pt's spouse smoked outside)    Smokeless tobacco: Never   Vaping Use    Vaping status: Never Used   Substance and Sexual Activity    Alcohol use: Never    Drug use: Never    Sexual activity: Defer       Family History   Problem Relation Age of Onset    Heart attack Mother     Heart attack Father     Hypertension Brother     Heart attack Maternal Grandmother     Hypertension Brother           Objective   Physical Exam  Physical Exam  The patient is in no acute distress, appears appropriate.  Lungs are clear to auscultation bilaterally without wheezing, rubs, or rales.  Heart has a regular rate and rhythm. There is a murmur that is 3 out of 6 without rubs or gallops.  Abdomen is soft and nontender.  There is no clubbing, cyanosis, or edema in the extremities.      CT Angio TAVR Chest Abdomen Pelvis    Result Date: 1/30/2025  Narrative: CT ANGIO TAVR CHEST ABDOMEN PELVIS- 1/29/2025 10:55 AM  HISTORY: pre op AVR; I35.0-Nonrheumatic aortic (valve) stenosis  COMPARISON: None  DOSE LENGTH PRODUCT: 984.27 mGy.cm Automated exposure control was also utilized to decrease patient radiation dose.  TECHNIQUE: Helical tomographic images of the chest, abdomen and pelvis utilizing angiographic protocol were obtained following the intravenous infusion of contrast, pre-TAVR assessment. Multiplanar and 3D reformatted images were provided for review. Volume rendered images were also generated. " Measurements provided by [facility] using "[employer].  FINDINGS:  ANGIOGRAM:  Ascending thoracic aorta measuring up to 4.3 cm. No dissection. Minimal atherosclerosis. Great vessels are patent.  Abdominal aorta and iliac vasculature is normal in caliber with minimal atherosclerosis. No dissection. Replaced common hepatic artery off the SMA. Celiac axis is patent. SMA is patent. KRISTINA is patent. There appear to be 2 bilateral renal arteries which are patent. No high-grade flow-limiting stenosis.  CHEST:  Airways/Lungs/Pleura: No consolidation or pleural effusion. No suspicious pulmonary nodule. Mild peripheral atelectasis/scarring.  Lower Neck: Unremarkable.  Mediastinum/Hilum: No enlarged lymphadenopathy.  Heart/Vasculature: No pericardial effusion. Moderate aortic valvular calcifications.  Chest wall/Soft Tissues: Unremarkable.  Osseous Structures: No acute or suspicious osseous finding.   ABDOMEN/ PELVIS:  Liver: Unremarkable.  Biliary Tree: Cholelithiasis.  Spleen: Unremarkable.  Pancreas: Unremarkable.  Adrenal Glands: Focal 9 mm areas of adrenal thickening bilaterally, likely hyperplasia.  Kidneys/Ureters/Bladder: Right renal cyst.  Reproductive Organs: Presumed prior hysterectomy.  Gastrointestinal Tract: Couple small distal appendicoliths. No evidence of acute appendicitis.  Lymphatics: Unremarkable.  Vasculature: No additional findings.  Peritoneum/Retroperitoneum: Unremarkable.  Abdominal Wall/Soft Tissues: Tiny fat-containing umbilical hernia.  Osseous Structures: No acute or suspicious findings.       Impression: 1. TAVR screening report generated by [facility] is included in PACS as an additional series and is available for review. 2. Dilated ascending thoracic aorta (4.3 cm). 3. Cholelithiasis.    This report was signed and finalized on 1/30/2025 9:01 AM by Truman Leggett.   This study was performed on the day of this office visit.  The interpretation was performed by radiology the next day.     This is enclosed for completeness      Results  Imaging  CT scan shows ascending aorta measures 4.5 cm in size in greatest dimension in the short access.  Right common femoral artery measures 8 mm in size. Left common femoral artery measures 7 mm in size.  Her root is amenable for implant of a percutaneous valve.       Diagnoses and all orders for this visit:    1. Aortic stenosis, severe (Primary)    2. Bicuspid aortic valve    3. Aneurysm of ascending aorta without rupture          Assessment & Plan     Assessment & Plan  1. Severe symptomatic aortic valve stenosis.  The natural progression of aortic valve stenosis was discussed. After reviewing her echocardiography results, it was determined that she meets the criteria for valve therapy. Treatment options, including medical management, TAVR, and SAVR, were explored. Based on her recent TAVR CT scan, she appears to be a suitable candidate for a TAVR approach. She is highly functional with minimal medical issues and has a family history of longevity, with several members living to be 90 years old. Given her age, a two-valve solution with SAVR first, followed by a potential TAVR, is a viable option. The presence of an aneurysm suggests that surgical management may be more appropriate. The rationale behind these recommendations was explained. The operative conduct for both the surgical and medical management solutions was discussed. Our structural heart team approach was also discussed, with the intent to consult with Dr. Randhawa and our colleagues this Friday. She will be contacted on Friday afternoon to discuss the group's opinion. She was informed that she is free to choose either option and that the team will support her decision, provided she fully understands the pros, cons, and risks of each option. Following today's comprehensive assessment, she is deemed an informed consumer capable of making a suitable decision. While she initially preferred TAVR, after  understanding the pros and cons of each approach, she leaned towards a surgical solution. She will await our call to discuss further. In the meantime, she was informed that she will require dental clearance prior to any valve therapy. All questions were answered to the best of our ability, and she expressed understanding. If a surgical solution is chosen and offered, her valvular prosthetic options, both mechanical and tissue, were discussed. She agreed to have a bovine pericardial tissue valve implanted as the preferred valve. She is a non-smoker and was congratulated on this.    2. Ascending aortic aneurysm.  The incidental finding of an ascending aortic aneurysm measuring 4.5 cm adds complexity to the decision-making process. It was discussed that it would not be unreasonable to consider surgical aortic valve replacement and exclusion of the left atrial appendage as a reasonable option. An alternative treatment plan could be to proceed with TAVR and continue ongoing aortic surveillance of her ascending aortic aneurysm. The potential risks and benefits of both approaches were discussed. She was informed that the aneurysm could be managed surgically, which might strengthen the argument for a surgical solution now rather than later.    She is a non smoker.    Many thanks for the opportunity to care for your patient.    I will continue to keep you apprised of provided care as it ensues.               Patient or patient representative verbalized consent for the use of Ambient Listening during the visit with  Marck Clark MD for chart documentation. 2/2/2025  19:32 CST

## 2025-02-03 NOTE — H&P (VIEW-ONLY)
Subjective   Patient ID: Mary Salazar is a 69 y.o. female who is here for evaluation of aortic valve stenosis.     Chief Complaint   Patient presents with    Severe Aortic Stenosis     New TAVR pt referred from Dr. Randhawa     History of Present Illness  The patient presents for evaluation of aortic valve stenosis.  She is accompanied by her .    She reports experiencing dyspnea, ankle edema, and mild fatigue, which have significantly worsened over the past year. She has not sought medical attention for heart failure.  She expresses concern about potential exposure to influenza, as her daughter-in-law recently contracted the virus, and her grandson is currently infected. She has been avoiding social contact to minimize her risk. She has not received the COVID-19 vaccine. She maintains good oral hygiene, with dental check-ups every 6 months, the most recent being 2 months ago.    She also suffers from severe neuropathy in her feet, which she suspects may be contributing to the swelling. The edema is most pronounced during periods of prolonged sitting, particularly at night. She is able to lie flat without difficulty but prefers not to sleep on her back due to perceived respiratory discomfort. She does not experience episodes of breathlessness at night that require her to sit up.    Supplemental Information  She has a history of hysterectomy.    SOCIAL HISTORY  She is a non-smoker.    FAMILY HISTORY  Her mother had 2 aneurysms, one in her stomach and another unspecified location, and  of a heart attack. Her father had an aneurysm, but the location is unknown. Most of her mother's sisters had heart problems.    IMMUNIZATIONS  She has not received the COVID-19 vaccine.    The following portions of the patient's history were reviewed and updated as appropriate: allergies, current medications, past family history, past medical history, past social history, past surgical history and problem list.    Past Medical  "History:   Diagnosis Date    Aneurysm     Congenital heart disease     Heart murmur     Heart valve disease     Hypertension        Past Surgical History:   Procedure Laterality Date    CARDIAC CATHETERIZATION N/A 12/28/2021    Procedure: Left Heart Cath;  Surgeon: Alexis Randhawa MD;  Location:  [facility];  Service: Cardiology;  Laterality: N/A;       Social History     Socioeconomic History    Marital status:    Tobacco Use    Smoking status: Never     Passive exposure: Past (\"Not long\" about 49 years ago - pt's spouse smoked outside)    Smokeless tobacco: Never   Vaping Use    Vaping status: Never Used   Substance and Sexual Activity    Alcohol use: Never    Drug use: Never    Sexual activity: Defer       Family History   Problem Relation Age of Onset    Heart attack Mother     Heart attack Father     Hypertension Brother     Heart attack Maternal Grandmother     Hypertension Brother           Objective   Physical Exam  Physical Exam  The patient is in no acute distress, appears appropriate.  Lungs are clear to auscultation bilaterally without wheezing, rubs, or rales.  Heart has a regular rate and rhythm. There is a murmur that is 3 out of 6 without rubs or gallops.  Abdomen is soft and nontender.  There is no clubbing, cyanosis, or edema in the extremities.      CT Angio TAVR Chest Abdomen Pelvis    Result Date: 1/30/2025  Narrative: CT ANGIO TAVR CHEST ABDOMEN PELVIS- 1/29/2025 10:55 AM  HISTORY: pre op AVR; I35.0-Nonrheumatic aortic (valve) stenosis  COMPARISON: None  DOSE LENGTH PRODUCT: 984.27 mGy.cm Automated exposure control was also utilized to decrease patient radiation dose.  TECHNIQUE: Helical tomographic images of the chest, abdomen and pelvis utilizing angiographic protocol were obtained following the intravenous infusion of contrast, pre-TAVR assessment. Multiplanar and 3D reformatted images were provided for review. Volume rendered images were also generated. " Measurements provided by [facility] using [employer].  FINDINGS:  ANGIOGRAM:  Ascending thoracic aorta measuring up to 4.3 cm. No dissection. Minimal atherosclerosis. Great vessels are patent.  Abdominal aorta and iliac vasculature is normal in caliber with minimal atherosclerosis. No dissection. Replaced common hepatic artery off the SMA. Celiac axis is patent. SMA is patent. KRISTINA is patent. There appear to be 2 bilateral renal arteries which are patent. No high-grade flow-limiting stenosis.  CHEST:  Airways/Lungs/Pleura: No consolidation or pleural effusion. No suspicious pulmonary nodule. Mild peripheral atelectasis/scarring.  Lower Neck: Unremarkable.  Mediastinum/Hilum: No enlarged lymphadenopathy.  Heart/Vasculature: No pericardial effusion. Moderate aortic valvular calcifications.  Chest wall/Soft Tissues: Unremarkable.  Osseous Structures: No acute or suspicious osseous finding.   ABDOMEN/ PELVIS:  Liver: Unremarkable.  Biliary Tree: Cholelithiasis.  Spleen: Unremarkable.  Pancreas: Unremarkable.  Adrenal Glands: Focal 9 mm areas of adrenal thickening bilaterally, likely hyperplasia.  Kidneys/Ureters/Bladder: Right renal cyst.  Reproductive Organs: Presumed prior hysterectomy.  Gastrointestinal Tract: Couple small distal appendicoliths. No evidence of acute appendicitis.  Lymphatics: Unremarkable.  Vasculature: No additional findings.  Peritoneum/Retroperitoneum: Unremarkable.  Abdominal Wall/Soft Tissues: Tiny fat-containing umbilical hernia.  Osseous Structures: No acute or suspicious findings.       Impression: 1. TAVR screening report generated by [facility] is included in PACS as an additional series and is available for review. 2. Dilated ascending thoracic aorta (4.3 cm). 3. Cholelithiasis.    This report was signed and finalized on 1/30/2025 9:01 AM by Truman Leggett.   This study was performed on the day of this office visit.  The interpretation was performed by radiology the next day.     This is enclosed for completeness      Results  Imaging  CT scan shows ascending aorta measures 4.5 cm in size in greatest dimension in the short access.  Right common femoral artery measures 8 mm in size. Left common femoral artery measures 7 mm in size.  Her root is amenable for implant of a percutaneous valve.       Diagnoses and all orders for this visit:    1. Aortic stenosis, severe (Primary)    2. Bicuspid aortic valve    3. Aneurysm of ascending aorta without rupture          Assessment & Plan     Assessment & Plan  1. Severe symptomatic aortic valve stenosis.  The natural progression of aortic valve stenosis was discussed. After reviewing her echocardiography results, it was determined that she meets the criteria for valve therapy. Treatment options, including medical management, TAVR, and SAVR, were explored. Based on her recent TAVR CT scan, she appears to be a suitable candidate for a TAVR approach. She is highly functional with minimal medical issues and has a family history of longevity, with several members living to be 90 years old. Given her age, a two-valve solution with SAVR first, followed by a potential TAVR, is a viable option. The presence of an aneurysm suggests that surgical management may be more appropriate. The rationale behind these recommendations was explained. The operative conduct for both the surgical and medical management solutions was discussed. Our structural heart team approach was also discussed, with the intent to consult with Dr. Randhawa and our colleagues this Friday. She will be contacted on Friday afternoon to discuss the group's opinion. She was informed that she is free to choose either option and that the team will support her decision, provided she fully understands the pros, cons, and risks of each option. Following today's comprehensive assessment, she is deemed an informed consumer capable of making a suitable decision. While she initially preferred TAVR, after  understanding the pros and cons of each approach, she leaned towards a surgical solution. She will await our call to discuss further. In the meantime, she was informed that she will require dental clearance prior to any valve therapy. All questions were answered to the best of our ability, and she expressed understanding. If a surgical solution is chosen and offered, her valvular prosthetic options, both mechanical and tissue, were discussed. She agreed to have a bovine pericardial tissue valve implanted as the preferred valve. She is a non-smoker and was congratulated on this.    2. Ascending aortic aneurysm.  The incidental finding of an ascending aortic aneurysm measuring 4.5 cm adds complexity to the decision-making process. It was discussed that it would not be unreasonable to consider surgical aortic valve replacement and exclusion of the left atrial appendage as a reasonable option. An alternative treatment plan could be to proceed with TAVR and continue ongoing aortic surveillance of her ascending aortic aneurysm. The potential risks and benefits of both approaches were discussed. She was informed that the aneurysm could be managed surgically, which might strengthen the argument for a surgical solution now rather than later.    She is a non smoker.    Many thanks for the opportunity to care for your patient.    I will continue to keep you apprised of provided care as it ensues.               Patient or patient representative verbalized consent for the use of Ambient Listening during the visit with  Marck Clark MD for chart documentation. 2/2/2025  19:32 CST

## 2025-02-07 ENCOUNTER — HOSPITAL ENCOUNTER (OUTPATIENT)
Facility: HOSPITAL | Age: 70
Setting detail: HOSPITAL OUTPATIENT SURGERY
Discharge: HOME | End: 2025-02-07
Attending: INTERNAL MEDICINE | Admitting: INTERNAL MEDICINE
Payer: MEDICARE

## 2025-02-07 VITALS
SYSTOLIC BLOOD PRESSURE: 120 MMHG | OXYGEN SATURATION: 95 % | DIASTOLIC BLOOD PRESSURE: 73 MMHG | HEART RATE: 49 BPM | RESPIRATION RATE: 16 BRPM

## 2025-02-07 DIAGNOSIS — I35.0 AORTIC STENOSIS, SEVERE: ICD-10-CM

## 2025-02-07 LAB
ALBUMIN SERPL-MCNC: 4.3 G/DL (ref 3.5–5.2)
ALBUMIN/GLOB SERPL: 1.7 G/DL
ALP SERPL-CCNC: 78 U/L (ref 39–117)
ALT SERPL W P-5'-P-CCNC: 16 U/L (ref 1–33)
ANION GAP SERPL CALCULATED.3IONS-SCNC: 8 MMOL/L (ref 5–15)
AST SERPL-CCNC: 18 U/L (ref 1–32)
BASOPHILS # BLD AUTO: 0.04 10*3/MM3 (ref 0–0.2)
BASOPHILS NFR BLD AUTO: 0.6 % (ref 0–1.5)
BILIRUB SERPL-MCNC: 0.5 MG/DL (ref 0–1.2)
BUN SERPL-MCNC: 14 MG/DL (ref 8–23)
BUN/CREAT SERPL: 16.5 (ref 7–25)
CALCIUM SPEC-SCNC: 9.3 MG/DL (ref 8.6–10.5)
CHLORIDE SERPL-SCNC: 102 MMOL/L (ref 98–107)
CO2 SERPL-SCNC: 29 MMOL/L (ref 22–29)
CREAT SERPL-MCNC: 0.85 MG/DL (ref 0.57–1)
DEPRECATED RDW RBC AUTO: 40.7 FL (ref 37–54)
EGFRCR SERPLBLD CKD-EPI 2021: 73.4 ML/MIN/1.73
EOSINOPHIL # BLD AUTO: 0.16 10*3/MM3 (ref 0–0.4)
EOSINOPHIL NFR BLD AUTO: 2.3 % (ref 0.3–6.2)
ERYTHROCYTE [DISTWIDTH] IN BLOOD BY AUTOMATED COUNT: 12.4 % (ref 12.3–15.4)
GLOBULIN UR ELPH-MCNC: 2.6 GM/DL
GLUCOSE SERPL-MCNC: 109 MG/DL (ref 65–99)
HCT VFR BLD AUTO: 45.6 % (ref 34–46.6)
HGB BLD-MCNC: 14.6 G/DL (ref 12–15.9)
IMM GRANULOCYTES # BLD AUTO: 0.03 10*3/MM3 (ref 0–0.05)
IMM GRANULOCYTES NFR BLD AUTO: 0.4 % (ref 0–0.5)
INR PPP: 0.97 (ref 0.91–1.09)
LYMPHOCYTES # BLD AUTO: 2.19 10*3/MM3 (ref 0.7–3.1)
LYMPHOCYTES NFR BLD AUTO: 31.9 % (ref 19.6–45.3)
MCH RBC QN AUTO: 28.9 PG (ref 26.6–33)
MCHC RBC AUTO-ENTMCNC: 32 G/DL (ref 31.5–35.7)
MCV RBC AUTO: 90.3 FL (ref 79–97)
MONOCYTES # BLD AUTO: 0.59 10*3/MM3 (ref 0.1–0.9)
MONOCYTES NFR BLD AUTO: 8.6 % (ref 5–12)
NEUTROPHILS NFR BLD AUTO: 3.85 10*3/MM3 (ref 1.7–7)
NEUTROPHILS NFR BLD AUTO: 56.2 % (ref 42.7–76)
NRBC BLD AUTO-RTO: 0 /100 WBC (ref 0–0.2)
PLATELET # BLD AUTO: 249 10*3/MM3 (ref 140–450)
PMV BLD AUTO: 9.7 FL (ref 6–12)
POTASSIUM SERPL-SCNC: 4.3 MMOL/L (ref 3.5–5.2)
PROT SERPL-MCNC: 6.9 G/DL (ref 6–8.5)
PROTHROMBIN TIME: 13.3 SECONDS (ref 11.8–14.8)
RBC # BLD AUTO: 5.05 10*6/MM3 (ref 3.77–5.28)
SODIUM SERPL-SCNC: 139 MMOL/L (ref 136–145)
WBC NRBC COR # BLD AUTO: 6.86 10*3/MM3 (ref 3.4–10.8)

## 2025-02-07 PROCEDURE — 25010000002 FENTANYL CITRATE (PF) 50 MCG/ML SOLUTION: Performed by: INTERNAL MEDICINE

## 2025-02-07 PROCEDURE — 25510000001 IOPAMIDOL PER 1 ML: Performed by: INTERNAL MEDICINE

## 2025-02-07 PROCEDURE — 85610 PROTHROMBIN TIME: CPT | Performed by: INTERNAL MEDICINE

## 2025-02-07 PROCEDURE — 25010000002 DIPHENHYDRAMINE PER 50 MG: Performed by: INTERNAL MEDICINE

## 2025-02-07 PROCEDURE — C1894 INTRO/SHEATH, NON-LASER: HCPCS | Performed by: INTERNAL MEDICINE

## 2025-02-07 PROCEDURE — 25010000002 LIDOCAINE 2% SOLUTION: Performed by: INTERNAL MEDICINE

## 2025-02-07 PROCEDURE — 25010000002 HEPARIN (PORCINE) PER 1000 UNITS: Performed by: INTERNAL MEDICINE

## 2025-02-07 PROCEDURE — 93454 CORONARY ARTERY ANGIO S&I: CPT | Performed by: INTERNAL MEDICINE

## 2025-02-07 PROCEDURE — 99152 MOD SED SAME PHYS/QHP 5/>YRS: CPT | Performed by: INTERNAL MEDICINE

## 2025-02-07 PROCEDURE — 25810000003 SODIUM CHLORIDE 0.9 % SOLUTION: Performed by: INTERNAL MEDICINE

## 2025-02-07 PROCEDURE — 25010000002 MIDAZOLAM HCL (PF) 5 MG/5ML SOLUTION: Performed by: INTERNAL MEDICINE

## 2025-02-07 PROCEDURE — C1769 GUIDE WIRE: HCPCS | Performed by: INTERNAL MEDICINE

## 2025-02-07 PROCEDURE — 80053 COMPREHEN METABOLIC PANEL: CPT | Performed by: INTERNAL MEDICINE

## 2025-02-07 PROCEDURE — 85025 COMPLETE CBC W/AUTO DIFF WBC: CPT | Performed by: INTERNAL MEDICINE

## 2025-02-07 RX ORDER — ASPIRIN 81 MG/1
324 TABLET, CHEWABLE ORAL ONCE
Status: COMPLETED | OUTPATIENT
Start: 2025-02-07 | End: 2025-02-07

## 2025-02-07 RX ORDER — MIDAZOLAM HYDROCHLORIDE 5 MG/5ML
INJECTION, SOLUTION INTRAMUSCULAR; INTRAVENOUS
Status: DISCONTINUED | OUTPATIENT
Start: 2025-02-07 | End: 2025-02-07 | Stop reason: HOSPADM

## 2025-02-07 RX ORDER — DIPHENHYDRAMINE HYDROCHLORIDE 50 MG/ML
INJECTION INTRAMUSCULAR; INTRAVENOUS
Status: DISCONTINUED | OUTPATIENT
Start: 2025-02-07 | End: 2025-02-07 | Stop reason: HOSPADM

## 2025-02-07 RX ORDER — ASPIRIN 81 MG/1
81 TABLET ORAL DAILY
Status: DISCONTINUED | OUTPATIENT
Start: 2025-02-08 | End: 2025-02-07 | Stop reason: HOSPADM

## 2025-02-07 RX ORDER — ASPIRIN 81 MG/1
TABLET, CHEWABLE ORAL
Status: COMPLETED
Start: 2025-02-07 | End: 2025-02-07

## 2025-02-07 RX ORDER — SODIUM CHLORIDE 9 MG/ML
100 INJECTION, SOLUTION INTRAVENOUS CONTINUOUS
Status: CANCELLED | OUTPATIENT
Start: 2025-02-07 | End: 2025-02-07

## 2025-02-07 RX ORDER — SODIUM CHLORIDE 0.9 % (FLUSH) 0.9 %
10 SYRINGE (ML) INJECTION EVERY 12 HOURS SCHEDULED
Status: DISCONTINUED | OUTPATIENT
Start: 2025-02-07 | End: 2025-02-07 | Stop reason: HOSPADM

## 2025-02-07 RX ORDER — LIDOCAINE HYDROCHLORIDE 20 MG/ML
INJECTION, SOLUTION INFILTRATION; PERINEURAL
Status: DISCONTINUED | OUTPATIENT
Start: 2025-02-07 | End: 2025-02-07 | Stop reason: HOSPADM

## 2025-02-07 RX ORDER — ACETAMINOPHEN 325 MG/1
650 TABLET ORAL EVERY 4 HOURS PRN
Status: CANCELLED | OUTPATIENT
Start: 2025-02-07

## 2025-02-07 RX ORDER — VERAPAMIL HYDROCHLORIDE 2.5 MG/ML
INJECTION, SOLUTION INTRAVENOUS
Status: DISCONTINUED | OUTPATIENT
Start: 2025-02-07 | End: 2025-02-07 | Stop reason: HOSPADM

## 2025-02-07 RX ORDER — HEPARIN SODIUM 1000 [USP'U]/ML
INJECTION, SOLUTION INTRAVENOUS; SUBCUTANEOUS
Status: DISCONTINUED | OUTPATIENT
Start: 2025-02-07 | End: 2025-02-07 | Stop reason: HOSPADM

## 2025-02-07 RX ORDER — SODIUM CHLORIDE 0.9 % (FLUSH) 0.9 %
10 SYRINGE (ML) INJECTION AS NEEDED
Status: DISCONTINUED | OUTPATIENT
Start: 2025-02-07 | End: 2025-02-07 | Stop reason: HOSPADM

## 2025-02-07 RX ORDER — SODIUM CHLORIDE 9 MG/ML
40 INJECTION, SOLUTION INTRAVENOUS AS NEEDED
Status: DISCONTINUED | OUTPATIENT
Start: 2025-02-07 | End: 2025-02-07 | Stop reason: HOSPADM

## 2025-02-07 RX ORDER — NITROGLYCERIN 0.4 MG/1
0.4 TABLET SUBLINGUAL
Status: CANCELLED | OUTPATIENT
Start: 2025-02-07

## 2025-02-07 RX ORDER — FENTANYL CITRATE 50 UG/ML
INJECTION, SOLUTION INTRAMUSCULAR; INTRAVENOUS
Status: DISCONTINUED | OUTPATIENT
Start: 2025-02-07 | End: 2025-02-07 | Stop reason: HOSPADM

## 2025-02-07 RX ADMIN — ASPIRIN 324 MG: 81 TABLET, CHEWABLE ORAL at 07:33

## 2025-02-07 RX ADMIN — SODIUM CHLORIDE 207.6 ML: 9 INJECTION, SOLUTION INTRAVENOUS at 07:33

## 2025-02-10 RX ORDER — IOPAMIDOL 755 MG/ML
INJECTION, SOLUTION INTRAVASCULAR
Status: DISCONTINUED | OUTPATIENT
Start: 2025-02-07 | End: 2025-02-10 | Stop reason: HOSPADM

## 2025-02-19 ENCOUNTER — TELEPHONE (OUTPATIENT)
Dept: CARDIAC SURGERY | Facility: CLINIC | Age: 70
End: 2025-02-19
Payer: COMMERCIAL

## 2025-02-19 DIAGNOSIS — I71.21 ANEURYSM OF ASCENDING AORTA WITHOUT RUPTURE: ICD-10-CM

## 2025-02-19 DIAGNOSIS — I35.0 AORTIC STENOSIS, SEVERE: Primary | ICD-10-CM

## 2025-02-19 NOTE — TELEPHONE ENCOUNTER
The remainder of her testing has been ordered, please schedule carotid US and PFT with [facility]. She may need FU in the office to discuss prior to proceeding with surgery.

## 2025-02-20 ENCOUNTER — PREP FOR SURGERY (OUTPATIENT)
Dept: OTHER | Facility: HOSPITAL | Age: 70
End: 2025-02-20
Payer: COMMERCIAL

## 2025-02-20 DIAGNOSIS — I35.0 AORTIC STENOSIS, SEVERE: Primary | ICD-10-CM

## 2025-02-20 DIAGNOSIS — I71.21 ANEURYSM OF ASCENDING AORTA WITHOUT RUPTURE: ICD-10-CM

## 2025-02-20 RX ORDER — CHLORHEXIDINE GLUCONATE 500 MG/1
CLOTH TOPICAL EVERY 12 HOURS PRN
OUTPATIENT
Start: 2025-02-20

## 2025-02-20 RX ORDER — NICOTINE POLACRILEX 4 MG
15 LOZENGE BUCCAL
OUTPATIENT
Start: 2025-02-20

## 2025-02-20 RX ORDER — PREGABALIN 25 MG/1
25 CAPSULE ORAL ONCE
OUTPATIENT
Start: 2025-03-06

## 2025-02-20 RX ORDER — DEXTROSE MONOHYDRATE 25 G/50ML
10-50 INJECTION, SOLUTION INTRAVENOUS
OUTPATIENT
Start: 2025-02-20

## 2025-02-20 RX ORDER — VANCOMYCIN/0.9 % SOD CHLORIDE 1 G/100 ML
15 PLASTIC BAG, INJECTION (ML) INTRAVENOUS ONCE
OUTPATIENT
Start: 2025-03-06

## 2025-02-20 RX ORDER — SODIUM CHLORIDE 9 MG/ML
30 INJECTION, SOLUTION INTRAVENOUS CONTINUOUS PRN
OUTPATIENT
Start: 2025-02-20 | End: 2025-02-21

## 2025-02-20 RX ORDER — IBUPROFEN 600 MG/1
1 TABLET ORAL
OUTPATIENT
Start: 2025-02-20

## 2025-02-20 RX ORDER — ACETAMINOPHEN 500 MG
1000 TABLET ORAL ONCE
OUTPATIENT
Start: 2025-03-06

## 2025-02-20 RX ORDER — SODIUM CHLORIDE 0.9 % (FLUSH) 0.9 %
30 SYRINGE (ML) INJECTION ONCE AS NEEDED
OUTPATIENT
Start: 2025-02-20

## 2025-02-20 RX ORDER — SODIUM CHLORIDE 0.9 % (FLUSH) 0.9 %
3 SYRINGE (ML) INJECTION EVERY 12 HOURS SCHEDULED
OUTPATIENT
Start: 2025-02-20

## 2025-02-20 RX ORDER — SODIUM CHLORIDE 0.9 % (FLUSH) 0.9 %
3-10 SYRINGE (ML) INJECTION AS NEEDED
OUTPATIENT
Start: 2025-02-20

## 2025-02-20 NOTE — TELEPHONE ENCOUNTER
Called patient to discuss tentative surgery date of March 6.  She is agreeable with this plan.  Verified pharmacy, will place orders and send Bactroban.  Advised her our office would call with additional instructions regarding the remainder of her testing and prework information.  She feels that Dr. Clark has explained everything very well and she has no further questions and does not need follow-up with him prior to surgery.

## 2025-02-20 NOTE — TELEPHONE ENCOUNTER
Office follow up sched for 3-19-25.  Let me know if that needs to change based on testing appts/kahm

## 2025-02-21 NOTE — TELEPHONE ENCOUNTER
Case #: [id number]   CPT Code: 68259   Pt aware of prework date/time and surgery date 03/06/2025 arrival at 0500. NPO/no medications after midnight. Aware of Bactroban prescription and instructions for use.

## 2025-03-04 ENCOUNTER — HOSPITAL ENCOUNTER (OUTPATIENT)
Dept: ULTRASOUND IMAGING | Facility: HOSPITAL | Age: 70
Discharge: HOME | End: 2025-03-04
Payer: MEDICARE

## 2025-03-04 ENCOUNTER — HOSPITAL ENCOUNTER (OUTPATIENT)
Dept: PULMONOLOGY | Facility: HOSPITAL | Age: 70
Discharge: HOME | End: 2025-03-04
Payer: MEDICARE

## 2025-03-04 ENCOUNTER — PRE-ADMISSION TESTING (OUTPATIENT)
Dept: PREADMISSION TESTING | Facility: HOSPITAL | Age: 70
DRG: 221 | End: 2025-03-04
Payer: MEDICARE

## 2025-03-04 ENCOUNTER — HOSPITAL ENCOUNTER (OUTPATIENT)
Dept: GENERAL RADIOLOGY | Facility: HOSPITAL | Age: 70
Discharge: HOME | End: 2025-03-04
Payer: MEDICARE

## 2025-03-04 ENCOUNTER — ANESTHESIA EVENT (OUTPATIENT)
Dept: PERIOP | Facility: HOSPITAL | Age: 70
End: 2025-03-04
Payer: MEDICARE

## 2025-03-04 VITALS
WEIGHT: 150.13 LBS | BODY MASS INDEX: 25.01 KG/M2 | HEART RATE: 68 BPM | OXYGEN SATURATION: 98 % | DIASTOLIC BLOOD PRESSURE: 72 MMHG | SYSTOLIC BLOOD PRESSURE: 122 MMHG | RESPIRATION RATE: 18 BRPM | HEIGHT: 65 IN

## 2025-03-04 DIAGNOSIS — I79.8 OTHER DISORDERS OF ARTERIES, ARTERIOLES AND CAPILLARIES IN DISEASES CLASSIFIED ELSEWHERE: ICD-10-CM

## 2025-03-04 DIAGNOSIS — I79.8 OTHER DISORDERS OF ARTERIES, ARTERIOLES AND CAPILLARIES IN DISEASES CLASSIFIED ELSEWHERE: Primary | ICD-10-CM

## 2025-03-04 DIAGNOSIS — I71.21 ANEURYSM OF ASCENDING AORTA WITHOUT RUPTURE: ICD-10-CM

## 2025-03-04 DIAGNOSIS — I35.0 AORTIC STENOSIS, SEVERE: ICD-10-CM

## 2025-03-04 LAB
ABO GROUP BLD: NORMAL
ALBUMIN SERPL-MCNC: 4.3 G/DL (ref 3.5–5.2)
ALBUMIN/GLOB SERPL: 1.8 G/DL
ALP SERPL-CCNC: 73 U/L (ref 39–117)
ALT SERPL W P-5'-P-CCNC: 17 U/L (ref 1–33)
ANION GAP SERPL CALCULATED.3IONS-SCNC: 11 MMOL/L (ref 5–15)
APTT PPP: 25.6 SECONDS (ref 24.5–36)
ARTERIAL PATENCY WRIST A: POSITIVE
AST SERPL-CCNC: 18 U/L (ref 1–32)
ATMOSPHERIC PRESS: 739 MMHG
BASE EXCESS BLDA CALC-SCNC: 2.4 MMOL/L (ref 0–2)
BASOPHILS # BLD AUTO: 0.03 10*3/MM3 (ref 0–0.2)
BASOPHILS NFR BLD AUTO: 0.4 % (ref 0–1.5)
BDY SITE: ABNORMAL
BILIRUB SERPL-MCNC: 0.5 MG/DL (ref 0–1.2)
BILIRUB UR QL STRIP: NEGATIVE
BLD GP AB SCN SERPL QL: NEGATIVE
BODY TEMPERATURE: 37
BUN SERPL-MCNC: 16 MG/DL (ref 8–23)
BUN/CREAT SERPL: 16.8 (ref 7–25)
CALCIUM SPEC-SCNC: 9.3 MG/DL (ref 8.6–10.5)
CHLORIDE SERPL-SCNC: 101 MMOL/L (ref 98–107)
CLARITY UR: CLEAR
CO2 SERPL-SCNC: 27 MMOL/L (ref 22–29)
COLOR UR: YELLOW
CREAT SERPL-MCNC: 0.95 MG/DL (ref 0.57–1)
DEPRECATED RDW RBC AUTO: 42 FL (ref 37–54)
EGFRCR SERPLBLD CKD-EPI 2021: 64.2 ML/MIN/1.73
EOSINOPHIL # BLD AUTO: 0.1 10*3/MM3 (ref 0–0.4)
EOSINOPHIL NFR BLD AUTO: 1.5 % (ref 0.3–6.2)
ERYTHROCYTE [DISTWIDTH] IN BLOOD BY AUTOMATED COUNT: 12.9 % (ref 12.3–15.4)
GLOBULIN UR ELPH-MCNC: 2.4 GM/DL
GLUCOSE SERPL-MCNC: 86 MG/DL (ref 65–99)
GLUCOSE UR STRIP-MCNC: NEGATIVE MG/DL
HBA1C MFR BLD: 5.8 % (ref 4.8–5.6)
HCO3 BLDA-SCNC: 27 MMOL/L (ref 20–26)
HCT VFR BLD AUTO: 41.5 % (ref 34–46.6)
HGB BLD-MCNC: 13.7 G/DL (ref 12–15.9)
HGB UR QL STRIP.AUTO: NEGATIVE
IMM GRANULOCYTES # BLD AUTO: 0.03 10*3/MM3 (ref 0–0.05)
IMM GRANULOCYTES NFR BLD AUTO: 0.4 % (ref 0–0.5)
INR PPP: 1.01 (ref 0.91–1.09)
KETONES UR QL STRIP: NEGATIVE
LEUKOCYTE ESTERASE UR QL STRIP.AUTO: NEGATIVE
LYMPHOCYTES # BLD AUTO: 2.12 10*3/MM3 (ref 0.7–3.1)
LYMPHOCYTES NFR BLD AUTO: 31.6 % (ref 19.6–45.3)
Lab: ABNORMAL
MCH RBC QN AUTO: 29.8 PG (ref 26.6–33)
MCHC RBC AUTO-ENTMCNC: 33 G/DL (ref 31.5–35.7)
MCV RBC AUTO: 90.2 FL (ref 79–97)
MODALITY: ABNORMAL
MONOCYTES # BLD AUTO: 0.62 10*3/MM3 (ref 0.1–0.9)
MONOCYTES NFR BLD AUTO: 9.3 % (ref 5–12)
NEUTROPHILS NFR BLD AUTO: 3.8 10*3/MM3 (ref 1.7–7)
NEUTROPHILS NFR BLD AUTO: 56.8 % (ref 42.7–76)
NITRITE UR QL STRIP: NEGATIVE
NRBC BLD AUTO-RTO: 0 /100 WBC (ref 0–0.2)
PCO2 BLDA: 40.5 MM HG (ref 35–45)
PCO2 TEMP ADJ BLD: 40.5 MM HG (ref 35–45)
PH BLDA: 7.43 PH UNITS (ref 7.35–7.45)
PH UR STRIP.AUTO: 6 [PH] (ref 5–8)
PH, TEMP CORRECTED: 7.43 PH UNITS (ref 7.35–7.45)
PLATELET # BLD AUTO: 241 10*3/MM3 (ref 140–450)
PMV BLD AUTO: 9.9 FL (ref 6–12)
PO2 BLDA: 78.2 MM HG (ref 83–108)
PO2 TEMP ADJ BLD: 78.2 MM HG (ref 83–108)
POTASSIUM SERPL-SCNC: 3.9 MMOL/L (ref 3.5–5.2)
PROT SERPL-MCNC: 6.7 G/DL (ref 6–8.5)
PROT UR QL STRIP: NEGATIVE
PROTHROMBIN TIME: 13.8 SECONDS (ref 11.8–14.8)
RBC # BLD AUTO: 4.6 10*6/MM3 (ref 3.77–5.28)
RH BLD: POSITIVE
SAO2 % BLDCOA: 96.6 % (ref 94–99)
SODIUM SERPL-SCNC: 139 MMOL/L (ref 136–145)
SP GR UR STRIP: 1.01 (ref 1–1.03)
T&S EXPIRATION DATE: NORMAL
UROBILINOGEN UR QL STRIP: NORMAL
VENTILATOR MODE: ABNORMAL
WBC NRBC COR # BLD AUTO: 6.7 10*3/MM3 (ref 3.4–10.8)

## 2025-03-04 PROCEDURE — 86920 COMPATIBILITY TEST SPIN: CPT

## 2025-03-04 PROCEDURE — 86901 BLOOD TYPING SEROLOGIC RH(D): CPT

## 2025-03-04 PROCEDURE — 86900 BLOOD TYPING SEROLOGIC ABO: CPT

## 2025-03-04 PROCEDURE — 80053 COMPREHEN METABOLIC PANEL: CPT

## 2025-03-04 PROCEDURE — 93010 ELECTROCARDIOGRAM REPORT: CPT | Performed by: INTERNAL MEDICINE

## 2025-03-04 PROCEDURE — 85610 PROTHROMBIN TIME: CPT

## 2025-03-04 PROCEDURE — 85730 THROMBOPLASTIN TIME PARTIAL: CPT

## 2025-03-04 PROCEDURE — 93005 ELECTROCARDIOGRAM TRACING: CPT

## 2025-03-04 PROCEDURE — 71046 X-RAY EXAM CHEST 2 VIEWS: CPT

## 2025-03-04 PROCEDURE — 93880 EXTRACRANIAL BILAT STUDY: CPT

## 2025-03-04 PROCEDURE — 36600 WITHDRAWAL OF ARTERIAL BLOOD: CPT

## 2025-03-04 PROCEDURE — 94010 BREATHING CAPACITY TEST: CPT

## 2025-03-04 PROCEDURE — 83036 HEMOGLOBIN GLYCOSYLATED A1C: CPT

## 2025-03-04 PROCEDURE — 36415 COLL VENOUS BLD VENIPUNCTURE: CPT

## 2025-03-04 PROCEDURE — 86850 RBC ANTIBODY SCREEN: CPT

## 2025-03-04 PROCEDURE — 82803 BLOOD GASES ANY COMBINATION: CPT

## 2025-03-04 PROCEDURE — 85025 COMPLETE CBC W/AUTO DIFF WBC: CPT

## 2025-03-04 PROCEDURE — 81003 URINALYSIS AUTO W/O SCOPE: CPT

## 2025-03-04 NOTE — ANESTHESIA PREPROCEDURE EVALUATION
Anesthesia Evaluation     Patient summary reviewed and Nursing notes reviewed   NPO Solid Status: > 8 hours  NPO Liquid Status: > 8 hours           Airway   Mallampati: I  No difficulty expected  Dental      Pulmonary - negative pulmonary ROS   (-) not a smoker  Cardiovascular - negative cardio ROS  Exercise tolerance: good (4-7 METS)    (+) hypertension, valvular problems/murmurs AS, CAD    ROS comment: Heart cath 2/7/25  Normal coronaries    Echo 12/15/24  ·  Left ventricular systolic function is normal. Left ventricular ejection fraction appears to be 56 - 60%.  ·  Severe aortic valve stenosis is present (V-max 4.1 m/s, mean gradient 42 mmHg, dimensionless index 0.24, MAURICIO 0.86 cm²), with mild aortic regurgitation.  ·  Left ventricular wall thickness is consistent with mild concentric hypertrophy.  ·  Left ventricular diastolic function is consistent with (grade Ia w/high LAP) impaired relaxation.  ·  Estimated right ventricular systolic pressure from tricuspid regurgitation is normal (<35 mmHg).  ·  Normal size and function of the right ventricle.  ·  No other significant (worse than mild) valvular pathology.  ·  Compared to prior exam from 4/10/2024, quantitative measures of aortic stenosis are now consistent with severe aortic stenosis.    Per records, possible bicuspid valve         Neuro/Psych- negative ROS  (-) seizures, TIA, CVA  GI/Hepatic/Renal/Endo - negative ROS   (+) GERD  (-) liver disease, no renal disease, diabetes    Musculoskeletal (-) negative ROS    Abdominal    Substance History - negative use     OB/GYN negative ob/gyn ROS         Other                      Anesthesia Plan    ASA 4     general     (No c/I to JESSICA)  intravenous induction     Anesthetic plan, risks, benefits, and alternatives have been provided, discussed and informed consent has been obtained with: patient.    CODE STATUS:

## 2025-03-04 NOTE — DISCHARGE INSTRUCTIONS

## 2025-03-06 ENCOUNTER — HOSPITAL ENCOUNTER (INPATIENT)
Facility: HOSPITAL | Age: 70
LOS: 4 days | Discharge: HOME | DRG: 221 | End: 2025-03-10
Attending: THORACIC SURGERY (CARDIOTHORACIC VASCULAR SURGERY) | Admitting: THORACIC SURGERY (CARDIOTHORACIC VASCULAR SURGERY)
Payer: MEDICARE

## 2025-03-06 ENCOUNTER — ANESTHESIA (OUTPATIENT)
Dept: PERIOP | Facility: HOSPITAL | Age: 70
End: 2025-03-06
Payer: MEDICARE

## 2025-03-06 ENCOUNTER — APPOINTMENT (OUTPATIENT)
Dept: CARDIOLOGY | Facility: HOSPITAL | Age: 70
DRG: 221 | End: 2025-03-06
Payer: MEDICARE

## 2025-03-06 ENCOUNTER — APPOINTMENT (OUTPATIENT)
Dept: GENERAL RADIOLOGY | Facility: HOSPITAL | Age: 70
DRG: 221 | End: 2025-03-06
Payer: MEDICARE

## 2025-03-06 DIAGNOSIS — I35.0 AORTIC STENOSIS, SEVERE: ICD-10-CM

## 2025-03-06 DIAGNOSIS — I71.21 ANEURYSM OF ASCENDING AORTA WITHOUT RUPTURE: ICD-10-CM

## 2025-03-06 DIAGNOSIS — Z95.2 S/P AVR (AORTIC VALVE REPLACEMENT): Primary | ICD-10-CM

## 2025-03-06 DIAGNOSIS — Z74.09 IMPAIRED MOBILITY: ICD-10-CM

## 2025-03-06 LAB
A-A DO2: ABNORMAL
ALBUMIN SERPL-MCNC: 3.6 G/DL (ref 3.5–5.2)
ALBUMIN SERPL-MCNC: 3.8 G/DL (ref 3.5–5.2)
ANION GAP SERPL CALCULATED.3IONS-SCNC: 13 MMOL/L (ref 5–15)
ANION GAP SERPL CALCULATED.3IONS-SCNC: 14 MMOL/L (ref 5–15)
APTT PPP: 32 SECONDS (ref 24.5–36)
ARTERIAL PATENCY WRIST A: ABNORMAL
ATMOSPHERIC PRESS: 753 MMHG
ATMOSPHERIC PRESS: 754 MMHG
ATMOSPHERIC PRESS: 755 MMHG
BASE EXCESS BLDA CALC-SCNC: -0.3 MMOL/L (ref 0–2)
BASE EXCESS BLDA CALC-SCNC: -0.5 MMOL/L (ref 0–2)
BASE EXCESS BLDA CALC-SCNC: 1 MMOL/L (ref 0–2)
BASE EXCESS BLDA CALC-SCNC: 1.1 MMOL/L (ref 0–2)
BASE EXCESS BLDA CALC-SCNC: 1.2 MMOL/L (ref 0–2)
BASE EXCESS BLDA CALC-SCNC: 1.6 MMOL/L (ref 0–2)
BASE EXCESS BLDA CALC-SCNC: 1.7 MMOL/L (ref 0–2)
BASE EXCESS BLDA CALC-SCNC: 2 MMOL/L (ref 0–2)
BASE EXCESS BLDA CALC-SCNC: 4.6 MMOL/L (ref 0–2)
BASE EXCESS BLDV CALC-SCNC: 0.9 MMOL/L (ref 0–2)
BDY SITE: ABNORMAL
BDY SITE: NORMAL
BODY TEMPERATURE: 27
BODY TEMPERATURE: 30
BODY TEMPERATURE: 35.4
BODY TEMPERATURE: 37
BUN SERPL-MCNC: 13 MG/DL (ref 8–23)
BUN SERPL-MCNC: 14 MG/DL (ref 8–23)
BUN/CREAT SERPL: 12.5 (ref 7–25)
BUN/CREAT SERPL: 13.3 (ref 7–25)
CA-I BLD-MCNC: 4.08 MG/DL (ref 4.6–5.4)
CA-I BLD-MCNC: 4.13 MG/DL (ref 4.6–5.4)
CA-I BLD-MCNC: 4.48 MG/DL (ref 4.6–5.4)
CA-I BLD-MCNC: 4.57 MG/DL (ref 4.6–5.4)
CA-I BLD-MCNC: 4.67 MG/DL (ref 4.6–5.4)
CA-I BLD-MCNC: 4.76 MG/DL (ref 4.6–5.4)
CA-I BLD-MCNC: 4.82 MG/DL (ref 4.6–5.4)
CA-I BLD-MCNC: 4.99 MG/DL (ref 4.6–5.4)
CA-I BLD-MCNC: 5.19 MG/DL (ref 4.6–5.4)
CALCIUM SPEC-SCNC: 8.6 MG/DL (ref 8.6–10.5)
CALCIUM SPEC-SCNC: 9.4 MG/DL (ref 8.6–10.5)
CHLORIDE SERPL-SCNC: 106 MMOL/L (ref 98–107)
CHLORIDE SERPL-SCNC: 108 MMOL/L (ref 98–107)
CO2 SERPL-SCNC: 24 MMOL/L (ref 22–29)
CO2 SERPL-SCNC: 25 MMOL/L (ref 22–29)
COHGB MFR BLD: 0.8 % (ref 0–5)
COHGB MFR BLD: 0.9 % (ref 0–5)
COHGB MFR BLD: 0.9 % (ref 0–5)
COHGB MFR BLD: 1 % (ref 0–5)
COHGB MFR BLD: 1.2 % (ref 0–5)
COHGB MFR BLD: 1.2 % (ref 0–5)
CPAP: ABNORMAL
CREAT SERPL-MCNC: 1.04 MG/DL (ref 0.57–1)
CREAT SERPL-MCNC: 1.05 MG/DL (ref 0.57–1)
DEPRECATED RDW RBC AUTO: 41.1 FL (ref 37–54)
DEPRECATED RDW RBC AUTO: 41.9 FL (ref 37–54)
DEPRECATED RDW RBC AUTO: 42.5 FL (ref 37–54)
EGFRCR SERPLBLD CKD-EPI 2021: 56.9 ML/MIN/1.73
EGFRCR SERPLBLD CKD-EPI 2021: 57.6 ML/MIN/1.73
EPAP: ABNORMAL
ERYTHROCYTE [DISTWIDTH] IN BLOOD BY AUTOMATED COUNT: 12.7 % (ref 12.3–15.4)
ERYTHROCYTE [DISTWIDTH] IN BLOOD BY AUTOMATED COUNT: 12.8 % (ref 12.3–15.4)
ERYTHROCYTE [DISTWIDTH] IN BLOOD BY AUTOMATED COUNT: 12.8 % (ref 12.3–15.4)
FIBRINOGEN PPP-MCNC: 141 MG/DL (ref 240–460)
FIBRINOGEN PPP-MCNC: 351 MG/DL (ref 240–460)
GAS FLOW AIRWAY: 0 LPM
GAS FLOW AIRWAY: 1.5 LPM
GAS FLOW AIRWAY: 1.5 LPM
GAS FLOW AIRWAY: 2 LPM
GAS FLOW AIRWAY: ABNORMAL L/MIN
GLUCOSE BLDA-MCNC: 105 MG/DL (ref 65–99)
GLUCOSE BLDA-MCNC: 109 MG/DL (ref 65–99)
GLUCOSE BLDA-MCNC: 110 MG/DL (ref 65–99)
GLUCOSE BLDA-MCNC: 154 MG/DL (ref 65–99)
GLUCOSE BLDA-MCNC: 161 MG/DL (ref 65–99)
GLUCOSE BLDA-MCNC: 171 MG/DL (ref 65–99)
GLUCOSE BLDC GLUCOMTR-MCNC: 148 MG/DL (ref 70–130)
GLUCOSE BLDC GLUCOMTR-MCNC: 152 MG/DL (ref 70–130)
GLUCOSE BLDC GLUCOMTR-MCNC: 158 MG/DL (ref 70–130)
GLUCOSE BLDC GLUCOMTR-MCNC: 162 MG/DL (ref 70–130)
GLUCOSE BLDC GLUCOMTR-MCNC: 168 MG/DL (ref 70–130)
GLUCOSE BLDC GLUCOMTR-MCNC: 195 MG/DL (ref 70–130)
GLUCOSE BLDC GLUCOMTR-MCNC: 221 MG/DL (ref 70–130)
GLUCOSE SERPL-MCNC: 155 MG/DL (ref 65–99)
GLUCOSE SERPL-MCNC: 170 MG/DL (ref 65–99)
HCO3 BLDA-SCNC: 23.3 MMOL/L (ref 20–26)
HCO3 BLDA-SCNC: 24.5 MMOL/L (ref 20–26)
HCO3 BLDA-SCNC: 25.2 MMOL/L (ref 20–26)
HCO3 BLDA-SCNC: 25.2 MMOL/L (ref 20–26)
HCO3 BLDA-SCNC: 26.5 MMOL/L (ref 20–26)
HCO3 BLDA-SCNC: 26.6 MMOL/L (ref 20–26)
HCO3 BLDA-SCNC: 26.8 MMOL/L (ref 20–26)
HCO3 BLDA-SCNC: 27.6 MMOL/L (ref 20–26)
HCO3 BLDA-SCNC: 29.3 MMOL/L (ref 20–26)
HCO3 BLDV-SCNC: 26.9 MMOL/L (ref 22–28)
HCT VFR BLD AUTO: 22.7 % (ref 34–46.6)
HCT VFR BLD AUTO: 29.4 % (ref 34–46.6)
HCT VFR BLD AUTO: 30.7 % (ref 34–46.6)
HCT VFR BLD CALC: 25.1 % (ref 38–51)
HCT VFR BLD CALC: 26.2 % (ref 38–51)
HCT VFR BLD CALC: 26.3 % (ref 38–51)
HCT VFR BLD CALC: 26.5 % (ref 38–51)
HCT VFR BLD CALC: 38 % (ref 38–51)
HCT VFR BLD CALC: 38.5 % (ref 38–51)
HGB BLD-MCNC: 10.3 G/DL (ref 12–15.9)
HGB BLD-MCNC: 7.9 G/DL (ref 12–15.9)
HGB BLD-MCNC: 9.8 G/DL (ref 12–15.9)
HGB BLDA-MCNC: 12.4 G/DL (ref 12–16)
HGB BLDA-MCNC: 12.6 G/DL (ref 12–16)
HGB BLDA-MCNC: 8.2 G/DL (ref 12–16)
HGB BLDA-MCNC: 8.6 G/DL (ref 12–16)
INHALED O2 CONCENTRATION: 100 %
INHALED O2 CONCENTRATION: 30 %
INHALED O2 CONCENTRATION: 50 %
INHALED O2 CONCENTRATION: 60 %
INHALED O2 CONCENTRATION: 70 %
INR PPP: 1.19 (ref 0.91–1.09)
INR PPP: 2.21 (ref 0.91–1.09)
IPAP: ABNORMAL
LACTATE BLDA-SCNC: 0.9 MMOL/L (ref 0.5–2)
LACTATE BLDA-SCNC: 1.2 MMOL/L (ref 0.5–2)
LACTATE BLDA-SCNC: 1.3 MMOL/L (ref 0.5–2)
LACTATE BLDA-SCNC: 2.6 MMOL/L (ref 0.5–2)
LACTATE BLDA-SCNC: 2.6 MMOL/L (ref 0.5–2)
LACTATE BLDA-SCNC: 4.2 MMOL/L (ref 0.5–2)
Lab: ABNORMAL
Lab: NORMAL
MCH RBC QN AUTO: 30.1 PG (ref 26.6–33)
MCH RBC QN AUTO: 30.2 PG (ref 26.6–33)
MCH RBC QN AUTO: 31.2 PG (ref 26.6–33)
MCHC RBC AUTO-ENTMCNC: 33.3 G/DL (ref 31.5–35.7)
MCHC RBC AUTO-ENTMCNC: 33.6 G/DL (ref 31.5–35.7)
MCHC RBC AUTO-ENTMCNC: 34.8 G/DL (ref 31.5–35.7)
MCV RBC AUTO: 89.7 FL (ref 79–97)
MCV RBC AUTO: 90 FL (ref 79–97)
MCV RBC AUTO: 90.2 FL (ref 79–97)
METHGB BLD QL: 0.5 % (ref 0–3)
METHGB BLD QL: 0.6 % (ref 0–3)
METHGB BLD QL: 0.8 % (ref 0–3)
METHGB BLD QL: 1 % (ref 0–3)
METHGB BLD QL: 1.1 % (ref 0–3)
METHGB BLD QL: 1.2 % (ref 0–3)
MODALITY: ABNORMAL
MODALITY: NORMAL
NITRIC OXIDE: ABNORMAL
NOTE: ABNORMAL
NOTIFIED BY: ABNORMAL
NOTIFIED WHO: ABNORMAL
OXYHGB MFR BLDV: 98.1 % (ref 94–99)
OXYHGB MFR BLDV: 98.3 % (ref 94–99)
OXYHGB MFR BLDV: 98.4 % (ref 94–99)
OXYHGB MFR BLDV: 98.8 % (ref 94–99)
OXYHGB MFR BLDV: 98.9 % (ref 94–99)
OXYHGB MFR BLDV: 99.2 % (ref 94–99)
PAW @ PEAK INSP FLOW SETTING VENT: ABNORMAL CM[H2O]
PCO2 BLDA: 33.6 MM HG (ref 35–45)
PCO2 BLDA: 33.9 MM HG (ref 35–45)
PCO2 BLDA: 37.1 MM HG (ref 35–45)
PCO2 BLDA: 42.8 MM HG (ref 35–45)
PCO2 BLDA: 43 MM HG (ref 35–45)
PCO2 BLDA: 43.7 MM HG (ref 35–45)
PCO2 BLDA: 44.1 MM HG (ref 35–45)
PCO2 BLDA: 45.7 MM HG (ref 35–45)
PCO2 BLDA: 47.1 MM HG (ref 35–45)
PCO2 BLDV: 48.8 MM HG (ref 41–51)
PCO2 TEMP ADJ BLD: 28.2 MM HG (ref 35–45)
PCO2 TEMP ADJ BLD: 33.6 MM HG (ref 35–45)
PCO2 TEMP ADJ BLD: 33.7 MM HG (ref 35–45)
PCO2 TEMP ADJ BLD: 33.9 MM HG (ref 35–45)
PCO2 TEMP ADJ BLD: 37.1 MM HG (ref 35–45)
PCO2 TEMP ADJ BLD: 39.9 MM HG (ref 35–45)
PCO2 TEMP ADJ BLD: 43 MM HG (ref 35–45)
PCO2 TEMP ADJ BLD: 44.1 MM HG (ref 35–45)
PCO2 TEMP ADJ BLD: 47.1 MM HG (ref 35–45)
PEEP RESPIRATORY: 5 CM[H2O]
PEEP RESPIRATORY: ABNORMAL CM[H2O]
PH BLDA: 7.37 PH UNITS (ref 7.35–7.45)
PH BLDA: 7.37 PH UNITS (ref 7.35–7.45)
PH BLDA: 7.38 PH UNITS (ref 7.35–7.45)
PH BLDA: 7.4 PH UNITS (ref 7.35–7.45)
PH BLDA: 7.4 PH UNITS (ref 7.35–7.45)
PH BLDA: 7.44 PH UNITS (ref 7.35–7.45)
PH BLDA: 7.44 PH UNITS (ref 7.35–7.45)
PH BLDA: 7.45 PH UNITS (ref 7.35–7.45)
PH BLDA: 7.47 PH UNITS (ref 7.35–7.45)
PH BLDV: 7.35 PH UNITS (ref 7.32–7.42)
PH, TEMP CORRECTED: 7.37 PH UNITS (ref 7.35–7.45)
PH, TEMP CORRECTED: 7.38 PH UNITS (ref 7.35–7.45)
PH, TEMP CORRECTED: 7.4 PH UNITS (ref 7.35–7.45)
PH, TEMP CORRECTED: 7.42 PH UNITS (ref 7.35–7.45)
PH, TEMP CORRECTED: 7.44 PH UNITS (ref 7.35–7.45)
PH, TEMP CORRECTED: 7.45 PH UNITS (ref 7.35–7.45)
PH, TEMP CORRECTED: 7.47 PH UNITS (ref 7.35–7.45)
PH, TEMP CORRECTED: 7.47 PH UNITS (ref 7.35–7.45)
PH, TEMP CORRECTED: 7.58 PH UNITS (ref 7.35–7.45)
PHOSPHATE SERPL-MCNC: 2.6 MG/DL (ref 2.5–4.5)
PHOSPHATE SERPL-MCNC: 2.7 MG/DL (ref 2.5–4.5)
PLATELET # BLD AUTO: 121 10*3/MM3 (ref 140–450)
PLATELET # BLD AUTO: 143 10*3/MM3 (ref 140–450)
PLATELET # BLD AUTO: 143 10*3/MM3 (ref 140–450)
PMV BLD AUTO: 9.5 FL (ref 6–12)
PMV BLD AUTO: 9.8 FL (ref 6–12)
PMV BLD AUTO: 9.8 FL (ref 6–12)
PO2 BLD: 131 MM[HG] (ref 0–500)
PO2 BLD: 186 MM[HG] (ref 0–500)
PO2 BLD: 97 MM[HG] (ref 0–500)
PO2 BLDA: 67.7 MM HG (ref 83–108)
PO2 BLDA: 78.5 MM HG (ref 83–108)
PO2 BLDA: 92.9 MM HG (ref 83–108)
PO2 BLDA: ABNORMAL MM[HG]
PO2 BLDV: 33.3 MM HG (ref 27–53)
PO2 TEMP ADJ BLD: 312 MM HG (ref 83–108)
PO2 TEMP ADJ BLD: 320 MM HG (ref 83–108)
PO2 TEMP ADJ BLD: 386 MM HG (ref 83–108)
PO2 TEMP ADJ BLD: 390 MM HG (ref 83–108)
PO2 TEMP ADJ BLD: 456 MM HG (ref 83–108)
PO2 TEMP ADJ BLD: 466 MM HG (ref 83–108)
PO2 TEMP ADJ BLD: 61 MM HG (ref 83–108)
PO2 TEMP ADJ BLD: 78.5 MM HG (ref 83–108)
PO2 TEMP ADJ BLD: 92.9 MM HG (ref 83–108)
POTASSIUM BLDA-SCNC: 3.5 MMOL/L (ref 3.5–5.2)
POTASSIUM BLDA-SCNC: 3.7 MMOL/L (ref 3.5–5.2)
POTASSIUM BLDA-SCNC: 3.9 MMOL/L (ref 3.5–5.2)
POTASSIUM BLDA-SCNC: 3.9 MMOL/L (ref 3.5–5.2)
POTASSIUM BLDA-SCNC: 4.1 MMOL/L (ref 3.5–5.2)
POTASSIUM BLDA-SCNC: 4.2 MMOL/L (ref 3.5–5.2)
POTASSIUM SERPL-SCNC: 3.5 MMOL/L (ref 3.5–5.2)
POTASSIUM SERPL-SCNC: 3.9 MMOL/L (ref 3.5–5.2)
POTASSIUM SERPL-SCNC: 3.9 MMOL/L (ref 3.5–5.2)
PROTHROMBIN TIME: 15.7 SECONDS (ref 11.8–14.8)
PROTHROMBIN TIME: 25.8 SECONDS (ref 11.8–14.8)
PSV: 10 CMH2O
PSV: ABNORMAL
PULSE OX: ABNORMAL
RBC # BLD AUTO: 2.53 10*6/MM3 (ref 3.77–5.28)
RBC # BLD AUTO: 3.26 10*6/MM3 (ref 3.77–5.28)
RBC # BLD AUTO: 3.41 10*6/MM3 (ref 3.77–5.28)
SAO2 % BLDCOA: 93.7 % (ref 94–99)
SAO2 % BLDCOA: 95.6 % (ref 94–99)
SAO2 % BLDCOA: 97.6 % (ref 94–99)
SAO2 % BLDCOA: >100.1 % (ref 94–99)
SAO2 % BLDCOV: 59 % (ref 45–75)
SET MECH RESP RATE: 18
SET MECH RESP RATE: 20
SET MECH RESP RATE: ABNORMAL
SODIUM BLDA-SCNC: 139 MMOL/L (ref 136–145)
SODIUM BLDA-SCNC: 141 MMOL/L (ref 136–145)
SODIUM BLDA-SCNC: 142 MMOL/L (ref 136–145)
SODIUM BLDA-SCNC: 143 MMOL/L (ref 136–145)
SODIUM BLDA-SCNC: 143 MMOL/L (ref 136–145)
SODIUM BLDA-SCNC: 144 MMOL/L (ref 136–145)
SODIUM SERPL-SCNC: 145 MMOL/L (ref 136–145)
SODIUM SERPL-SCNC: 145 MMOL/L (ref 136–145)
TOTAL RATE: ABNORMAL
VENTILATOR MODE: ABNORMAL
VENTILATOR MODE: NORMAL
VT ON VENT VENT: 450 ML
VT ON VENT VENT: ABNORMAL ML
WBC NRBC COR # BLD AUTO: 10.03 10*3/MM3 (ref 3.4–10.8)
WBC NRBC COR # BLD AUTO: 8.38 10*3/MM3 (ref 3.4–10.8)
WBC NRBC COR # BLD AUTO: 8.65 10*3/MM3 (ref 3.4–10.8)

## 2025-03-06 PROCEDURE — C1768 GRAFT, VASCULAR: HCPCS | Performed by: THORACIC SURGERY (CARDIOTHORACIC VASCULAR SURGERY)

## 2025-03-06 PROCEDURE — 02RX0JZ REPLACEMENT OF THORACIC AORTA, ASCENDING/ARCH WITH SYNTHETIC SUBSTITUTE, OPEN APPROACH: ICD-10-PCS | Performed by: THORACIC SURGERY (CARDIOTHORACIC VASCULAR SURGERY)

## 2025-03-06 PROCEDURE — 82805 BLOOD GASES W/O2 SATURATION: CPT

## 2025-03-06 PROCEDURE — C1713 ANCHOR/SCREW BN/BN,TIS/BN: HCPCS | Performed by: THORACIC SURGERY (CARDIOTHORACIC VASCULAR SURGERY)

## 2025-03-06 PROCEDURE — 25010000002 PROPOFOL 10 MG/ML EMULSION

## 2025-03-06 PROCEDURE — C1889 IMPLANT/INSERT DEVICE, NOC: HCPCS | Performed by: THORACIC SURGERY (CARDIOTHORACIC VASCULAR SURGERY)

## 2025-03-06 PROCEDURE — 25010000002 ALBUMIN HUMAN 25% PER 50 ML

## 2025-03-06 PROCEDURE — 82330 ASSAY OF CALCIUM: CPT

## 2025-03-06 PROCEDURE — 93010 ELECTROCARDIOGRAM REPORT: CPT | Performed by: INTERNAL MEDICINE

## 2025-03-06 PROCEDURE — 93318 ECHO TRANSESOPHAGEAL INTRAOP: CPT

## 2025-03-06 PROCEDURE — 25810000003 DEXTROSE 5 % WITH KCL 20 MEQ 20 MEQ/L SOLUTION: Performed by: THORACIC SURGERY (CARDIOTHORACIC VASCULAR SURGERY)

## 2025-03-06 PROCEDURE — 84295 ASSAY OF SERUM SODIUM: CPT

## 2025-03-06 PROCEDURE — 93325 DOPPLER ECHO COLOR FLOW MAPG: CPT | Performed by: INTERNAL MEDICINE

## 2025-03-06 PROCEDURE — 25010000002 HEPARIN (PORCINE) PER 1000 UNITS

## 2025-03-06 PROCEDURE — 82947 ASSAY GLUCOSE BLOOD QUANT: CPT

## 2025-03-06 PROCEDURE — 25010000002 PHENYLEPHRINE 10 MG/ML SOLUTION: Performed by: THORACIC SURGERY (CARDIOTHORACIC VASCULAR SURGERY)

## 2025-03-06 PROCEDURE — 94761 N-INVAS EAR/PLS OXIMETRY MLT: CPT

## 2025-03-06 PROCEDURE — 25010000002 PROTAMINE SULFATE PER 10 MG

## 2025-03-06 PROCEDURE — 25010000002 VANCOMYCIN 1 G RECONSTITUTED SOLUTION 1 EACH VIAL: Performed by: THORACIC SURGERY (CARDIOTHORACIC VASCULAR SURGERY)

## 2025-03-06 PROCEDURE — 93005 ELECTROCARDIOGRAM TRACING: CPT | Performed by: THORACIC SURGERY (CARDIOTHORACIC VASCULAR SURGERY)

## 2025-03-06 PROCEDURE — P9047 ALBUMIN (HUMAN), 25%, 50ML: HCPCS

## 2025-03-06 PROCEDURE — 25010000002 MIDAZOLAM PER 1MG: Performed by: ANESTHESIOLOGY

## 2025-03-06 PROCEDURE — 25810000003 LACTATED RINGERS PER 1000 ML

## 2025-03-06 PROCEDURE — 33405 REPLACEMENT AORTIC VALVE OPN: CPT | Performed by: THORACIC SURGERY (CARDIOTHORACIC VASCULAR SURGERY)

## 2025-03-06 PROCEDURE — 83050 HGB METHEMOGLOBIN QUAN: CPT

## 2025-03-06 PROCEDURE — 82948 REAGENT STRIP/BLOOD GLUCOSE: CPT

## 2025-03-06 PROCEDURE — S0260 H&P FOR SURGERY: HCPCS | Performed by: THORACIC SURGERY (CARDIOTHORACIC VASCULAR SURGERY)

## 2025-03-06 PROCEDURE — 88305 TISSUE EXAM BY PATHOLOGIST: CPT | Performed by: THORACIC SURGERY (CARDIOTHORACIC VASCULAR SURGERY)

## 2025-03-06 PROCEDURE — 82803 BLOOD GASES ANY COMBINATION: CPT

## 2025-03-06 PROCEDURE — 25010000002 CEFAZOLIN PER 500 MG: Performed by: THORACIC SURGERY (CARDIOTHORACIC VASCULAR SURGERY)

## 2025-03-06 PROCEDURE — 85027 COMPLETE CBC AUTOMATED: CPT | Performed by: THORACIC SURGERY (CARDIOTHORACIC VASCULAR SURGERY)

## 2025-03-06 PROCEDURE — 25010000002 ACETAMINOPHEN 10 MG/ML SOLUTION: Performed by: THORACIC SURGERY (CARDIOTHORACIC VASCULAR SURGERY)

## 2025-03-06 PROCEDURE — 94640 AIRWAY INHALATION TREATMENT: CPT

## 2025-03-06 PROCEDURE — 25010000002 CEFAZOLIN PER 500 MG

## 2025-03-06 PROCEDURE — 88304 TISSUE EXAM BY PATHOLOGIST: CPT | Performed by: THORACIC SURGERY (CARDIOTHORACIC VASCULAR SURGERY)

## 2025-03-06 PROCEDURE — 97605 NEG PRS WND THER DME<=50SQCM: CPT | Performed by: THORACIC SURGERY (CARDIOTHORACIC VASCULAR SURGERY)

## 2025-03-06 PROCEDURE — 25810000003 LACTATED RINGERS PER 1000 ML: Performed by: ANESTHESIOLOGY

## 2025-03-06 PROCEDURE — 86927 PLASMA FRESH FROZEN: CPT

## 2025-03-06 PROCEDURE — 25010000002 FUROSEMIDE PER 20 MG

## 2025-03-06 PROCEDURE — 25010000002 MIDAZOLAM HCL (PF) 5 MG/5ML SOLUTION

## 2025-03-06 PROCEDURE — 94799 UNLISTED PULMONARY SVC/PX: CPT

## 2025-03-06 PROCEDURE — 25810000003 SODIUM CHLORIDE 0.9 % SOLUTION 250 ML FLEX CONT

## 2025-03-06 PROCEDURE — 25010000002 NICARDIPINE HCL IN NACL 20-0.9 MG/200ML-% SOLUTION: Performed by: THORACIC SURGERY (CARDIOTHORACIC VASCULAR SURGERY)

## 2025-03-06 PROCEDURE — P9037 PLATE PHERES LEUKOREDU IRRAD: HCPCS

## 2025-03-06 PROCEDURE — 25810000003 LACTATED RINGERS PER 1000 ML: Performed by: THORACIC SURGERY (CARDIOTHORACIC VASCULAR SURGERY)

## 2025-03-06 PROCEDURE — 76937 US GUIDE VASCULAR ACCESS: CPT | Performed by: THORACIC SURGERY (CARDIOTHORACIC VASCULAR SURGERY)

## 2025-03-06 PROCEDURE — 80069 RENAL FUNCTION PANEL: CPT | Performed by: THORACIC SURGERY (CARDIOTHORACIC VASCULAR SURGERY)

## 2025-03-06 PROCEDURE — P9059 PLASMA, FRZ BETWEEN 8-24HOUR: HCPCS

## 2025-03-06 PROCEDURE — P9100 PATHOGEN TEST FOR PLATELETS: HCPCS

## 2025-03-06 PROCEDURE — C1760 CLOSURE DEV, VASC: HCPCS | Performed by: THORACIC SURGERY (CARDIOTHORACIC VASCULAR SURGERY)

## 2025-03-06 PROCEDURE — 25010000002 POTASSIUM CHLORIDE PER 2 MEQ: Performed by: THORACIC SURGERY (CARDIOTHORACIC VASCULAR SURGERY)

## 2025-03-06 PROCEDURE — 25010000002 SUGAMMADEX 200 MG/2ML SOLUTION

## 2025-03-06 PROCEDURE — 84132 ASSAY OF SERUM POTASSIUM: CPT | Performed by: THORACIC SURGERY (CARDIOTHORACIC VASCULAR SURGERY)

## 2025-03-06 PROCEDURE — 36430 TRANSFUSION BLD/BLD COMPNT: CPT

## 2025-03-06 PROCEDURE — 71045 X-RAY EXAM CHEST 1 VIEW: CPT

## 2025-03-06 PROCEDURE — 25010000002 FENTANYL CITRATE (PF) 50 MCG/ML SOLUTION

## 2025-03-06 PROCEDURE — 85018 HEMOGLOBIN: CPT

## 2025-03-06 PROCEDURE — 25010000002 METHYLPREDNISOLONE PER 125 MG

## 2025-03-06 PROCEDURE — 25010000002 MANNITOL PER 50 ML

## 2025-03-06 PROCEDURE — P9035 PLATELET PHERES LEUKOREDUCED: HCPCS

## 2025-03-06 PROCEDURE — C1751 CATH, INF, PER/CENT/MIDLINE: HCPCS

## 2025-03-06 PROCEDURE — 88311 DECALCIFY TISSUE: CPT | Performed by: THORACIC SURGERY (CARDIOTHORACIC VASCULAR SURGERY)

## 2025-03-06 PROCEDURE — 84132 ASSAY OF SERUM POTASSIUM: CPT

## 2025-03-06 PROCEDURE — 33268 EXCL LAA OPN OTH PX ANY METH: CPT | Performed by: THORACIC SURGERY (CARDIOTHORACIC VASCULAR SURGERY)

## 2025-03-06 PROCEDURE — 88313 SPECIAL STAINS GROUP 2: CPT | Performed by: THORACIC SURGERY (CARDIOTHORACIC VASCULAR SURGERY)

## 2025-03-06 PROCEDURE — 25010000002 HEPARIN (PORCINE) PER 1000 UNITS: Performed by: THORACIC SURGERY (CARDIOTHORACIC VASCULAR SURGERY)

## 2025-03-06 PROCEDURE — 85384 FIBRINOGEN ACTIVITY: CPT | Performed by: THORACIC SURGERY (CARDIOTHORACIC VASCULAR SURGERY)

## 2025-03-06 PROCEDURE — 04HY32Z INSERTION OF MONITORING DEVICE INTO LOWER ARTERY, PERCUTANEOUS APPROACH: ICD-10-PCS | Performed by: THORACIC SURGERY (CARDIOTHORACIC VASCULAR SURGERY)

## 2025-03-06 PROCEDURE — 25010000002 LIDOCAINE PF 2% 2 % SOLUTION

## 2025-03-06 PROCEDURE — 82375 ASSAY CARBOXYHB QUANT: CPT

## 2025-03-06 PROCEDURE — 85610 PROTHROMBIN TIME: CPT | Performed by: THORACIC SURGERY (CARDIOTHORACIC VASCULAR SURGERY)

## 2025-03-06 PROCEDURE — 83605 ASSAY OF LACTIC ACID: CPT

## 2025-03-06 PROCEDURE — 25810000003 SODIUM CHLORIDE 0.9 % SOLUTION

## 2025-03-06 PROCEDURE — 25010000002 FENTANYL CITRATE (PF) 50 MCG/ML SOLUTION: Performed by: THORACIC SURGERY (CARDIOTHORACIC VASCULAR SURGERY)

## 2025-03-06 PROCEDURE — 63710000001 INSULIN REGULAR HUMAN PER 5 UNITS

## 2025-03-06 PROCEDURE — 93312 ECHO TRANSESOPHAGEAL: CPT | Performed by: INTERNAL MEDICINE

## 2025-03-06 PROCEDURE — 02L70CK OCCLUSION OF LEFT ATRIAL APPENDAGE WITH EXTRALUMINAL DEVICE, OPEN APPROACH: ICD-10-PCS | Performed by: THORACIC SURGERY (CARDIOTHORACIC VASCULAR SURGERY)

## 2025-03-06 PROCEDURE — 85730 THROMBOPLASTIN TIME PARTIAL: CPT | Performed by: THORACIC SURGERY (CARDIOTHORACIC VASCULAR SURGERY)

## 2025-03-06 PROCEDURE — P9012 CRYOPRECIPITATE EACH UNIT: HCPCS

## 2025-03-06 PROCEDURE — 94002 VENT MGMT INPAT INIT DAY: CPT

## 2025-03-06 PROCEDURE — 25010000002 AMIODARONE IN DEXTROSE 5% 360-4.14 MG/200ML-% SOLUTION: Performed by: THORACIC SURGERY (CARDIOTHORACIC VASCULAR SURGERY)

## 2025-03-06 PROCEDURE — 25810000003 SODIUM CHLORIDE 0.9 % SOLUTION: Performed by: THORACIC SURGERY (CARDIOTHORACIC VASCULAR SURGERY)

## 2025-03-06 PROCEDURE — 5A1221Z PERFORMANCE OF CARDIAC OUTPUT, CONTINUOUS: ICD-10-PCS | Performed by: THORACIC SURGERY (CARDIOTHORACIC VASCULAR SURGERY)

## 2025-03-06 PROCEDURE — 02RF08Z REPLACEMENT OF AORTIC VALVE WITH ZOOPLASTIC TISSUE, OPEN APPROACH: ICD-10-PCS | Performed by: THORACIC SURGERY (CARDIOTHORACIC VASCULAR SURGERY)

## 2025-03-06 PROCEDURE — 25010000002 VANCOMYCIN PER 500 MG: Performed by: THORACIC SURGERY (CARDIOTHORACIC VASCULAR SURGERY)

## 2025-03-06 DEVICE — PLEDGET INCISIONLINE REINF TFE SFT PTFE 1.5X3X7MM WHT: Type: IMPLANTABLE DEVICE | Site: HEART | Status: FUNCTIONAL

## 2025-03-06 DEVICE — VLV AORTA INSPRIS RESILIA 21MM: Type: IMPLANTABLE DEVICE | Site: HEART | Status: FUNCTIONAL

## 2025-03-06 DEVICE — COR-KNOT MINI® COMBO KITBASE PACKAGE TYPE - KITEACH STERILE PACKAGE KIT CONTAINS (2) SINGLE PATIENT USE COR-KNOT MINI® DEVICES AND (12) COR-KNOT® QUICK LOADS®.
Type: IMPLANTABLE DEVICE | Site: HEART | Status: FUNCTIONAL
Brand: COR-KNOT MINI®

## 2025-03-06 DEVICE — WR SUT NONABS MF SS CCS 1/2CIR CUT/CONV 5/0 18IN M657G: Type: IMPLANTABLE DEVICE | Site: CHEST | Status: FUNCTIONAL

## 2025-03-06 DEVICE — KT HEMOST ABS SURGIFOAM PORCN 1GRAM: Type: IMPLANTABLE DEVICE | Site: CHEST | Status: FUNCTIONAL

## 2025-03-06 DEVICE — ADHS SURG BIOGLUE PREF 5ML: Type: IMPLANTABLE DEVICE | Site: HEART | Status: FUNCTIONAL

## 2025-03-06 DEVICE — APPL CLIP LAA ATRICLIP FLX 35MM: Type: IMPLANTABLE DEVICE | Site: HEART | Status: FUNCTIONAL

## 2025-03-06 DEVICE — COR-KNOT® QUICK LOAD® SINGLES
Type: IMPLANTABLE DEVICE | Site: HEART | Status: FUNCTIONAL
Brand: COR-KNOT® QUICK LOAD®

## 2025-03-06 DEVICE — HEMOST ABS SURGIFOAM SZ100 8X12 10MM: Type: IMPLANTABLE DEVICE | Site: CHEST | Status: FUNCTIONAL

## 2025-03-06 DEVICE — SUT MF STL K60 SZ5 18X6IN PK/6: Type: IMPLANTABLE DEVICE | Site: CHEST | Status: FUNCTIONAL

## 2025-03-06 DEVICE — IMPLANTABLE DEVICE: Type: IMPLANTABLE DEVICE | Site: HEART | Status: FUNCTIONAL

## 2025-03-06 RX ORDER — CHLORHEXIDINE GLUCONATE ORAL RINSE 1.2 MG/ML
15 SOLUTION DENTAL EVERY 12 HOURS SCHEDULED
Status: DISCONTINUED | OUTPATIENT
Start: 2025-03-06 | End: 2025-03-06 | Stop reason: SDUPTHER

## 2025-03-06 RX ORDER — NALOXONE HCL 0.4 MG/ML
0.4 VIAL (ML) INJECTION
Status: ACTIVE | OUTPATIENT
Start: 2025-03-06 | End: 2025-03-08

## 2025-03-06 RX ORDER — DEXTROSE MONOHYDRATE 25 G/50ML
10-50 INJECTION, SOLUTION INTRAVENOUS
Status: DISCONTINUED | OUTPATIENT
Start: 2025-03-06 | End: 2025-03-07

## 2025-03-06 RX ORDER — LIDOCAINE 4 G/G
2 PATCH TOPICAL
Status: DISCONTINUED | OUTPATIENT
Start: 2025-03-06 | End: 2025-03-10 | Stop reason: HOSPADM

## 2025-03-06 RX ORDER — DEXMEDETOMIDINE HYDROCHLORIDE 4 UG/ML
INJECTION, SOLUTION INTRAVENOUS CONTINUOUS PRN
Status: DISCONTINUED | OUTPATIENT
Start: 2025-03-06 | End: 2025-03-06 | Stop reason: SURG

## 2025-03-06 RX ORDER — ATORVASTATIN CALCIUM 10 MG/1
20 TABLET, FILM COATED ORAL NIGHTLY
Status: DISCONTINUED | OUTPATIENT
Start: 2025-03-07 | End: 2025-03-10 | Stop reason: HOSPADM

## 2025-03-06 RX ORDER — BISACODYL 5 MG/1
10 TABLET, DELAYED RELEASE ORAL 2 TIMES DAILY
Status: DISCONTINUED | OUTPATIENT
Start: 2025-03-07 | End: 2025-03-10 | Stop reason: HOSPADM

## 2025-03-06 RX ORDER — OXYCODONE HYDROCHLORIDE 10 MG/1
10 TABLET ORAL
Status: DISCONTINUED | OUTPATIENT
Start: 2025-03-06 | End: 2025-03-10 | Stop reason: HOSPADM

## 2025-03-06 RX ORDER — CALCIUM CHLORIDE 100 MG/ML
INJECTION INTRAVENOUS; INTRAVENTRICULAR AS NEEDED
Status: DISCONTINUED | OUTPATIENT
Start: 2025-03-06 | End: 2025-03-06 | Stop reason: SURG

## 2025-03-06 RX ORDER — IPRATROPIUM BROMIDE AND ALBUTEROL SULFATE 2.5; .5 MG/3ML; MG/3ML
1.5 SOLUTION RESPIRATORY (INHALATION)
Status: DISCONTINUED | OUTPATIENT
Start: 2025-03-06 | End: 2025-03-10

## 2025-03-06 RX ORDER — FENTANYL CITRATE 50 UG/ML
INJECTION, SOLUTION INTRAMUSCULAR; INTRAVENOUS AS NEEDED
Status: DISCONTINUED | OUTPATIENT
Start: 2025-03-06 | End: 2025-03-06 | Stop reason: SURG

## 2025-03-06 RX ORDER — ONDANSETRON 2 MG/ML
4 INJECTION INTRAMUSCULAR; INTRAVENOUS EVERY 6 HOURS PRN
Status: DISCONTINUED | OUTPATIENT
Start: 2025-03-06 | End: 2025-03-10 | Stop reason: HOSPADM

## 2025-03-06 RX ORDER — SODIUM CHLORIDE 0.9 % (FLUSH) 0.9 %
3 SYRINGE (ML) INJECTION EVERY 12 HOURS SCHEDULED
Status: DISCONTINUED | OUTPATIENT
Start: 2025-03-06 | End: 2025-03-06 | Stop reason: HOSPADM

## 2025-03-06 RX ORDER — IBUPROFEN 600 MG/1
1 TABLET ORAL
Status: DISCONTINUED | OUTPATIENT
Start: 2025-03-06 | End: 2025-03-06 | Stop reason: HOSPADM

## 2025-03-06 RX ORDER — POTASSIUM CHLORIDE 29.8 MG/ML
20 INJECTION INTRAVENOUS
Status: COMPLETED | OUTPATIENT
Start: 2025-03-06 | End: 2025-03-06

## 2025-03-06 RX ORDER — METOPROLOL TARTRATE 1 MG/ML
INJECTION, SOLUTION INTRAVENOUS AS NEEDED
Status: DISCONTINUED | OUTPATIENT
Start: 2025-03-06 | End: 2025-03-06 | Stop reason: SURG

## 2025-03-06 RX ORDER — SODIUM CHLORIDE 0.9 % (FLUSH) 0.9 %
3 SYRINGE (ML) INJECTION AS NEEDED
Status: DISCONTINUED | OUTPATIENT
Start: 2025-03-06 | End: 2025-03-06 | Stop reason: HOSPADM

## 2025-03-06 RX ORDER — SODIUM CHLORIDE, SODIUM LACTATE, POTASSIUM CHLORIDE, CALCIUM CHLORIDE 600; 310; 30; 20 MG/100ML; MG/100ML; MG/100ML; MG/100ML
1000 INJECTION, SOLUTION INTRAVENOUS CONTINUOUS
Status: DISCONTINUED | OUTPATIENT
Start: 2025-03-06 | End: 2025-03-06

## 2025-03-06 RX ORDER — SODIUM CHLORIDE 9 MG/ML
40 INJECTION, SOLUTION INTRAVENOUS AS NEEDED
Status: DISCONTINUED | OUTPATIENT
Start: 2025-03-06 | End: 2025-03-06 | Stop reason: HOSPADM

## 2025-03-06 RX ORDER — DEXTROSE MONOHYDRATE 25 G/50ML
10-50 INJECTION, SOLUTION INTRAVENOUS
Status: DISCONTINUED | OUTPATIENT
Start: 2025-03-06 | End: 2025-03-06 | Stop reason: HOSPADM

## 2025-03-06 RX ORDER — SODIUM CHLORIDE 0.9 % (FLUSH) 0.9 %
3-10 SYRINGE (ML) INJECTION AS NEEDED
Status: DISCONTINUED | OUTPATIENT
Start: 2025-03-06 | End: 2025-03-06 | Stop reason: HOSPADM

## 2025-03-06 RX ORDER — NITROGLYCERIN 20 MG/100ML
5 INJECTION INTRAVENOUS CONTINUOUS
Status: DISCONTINUED | OUTPATIENT
Start: 2025-03-06 | End: 2025-03-08

## 2025-03-06 RX ORDER — VANCOMYCIN/0.9 % SOD CHLORIDE 1 G/100 ML
15 PLASTIC BAG, INJECTION (ML) INTRAVENOUS ONCE
Status: COMPLETED | OUTPATIENT
Start: 2025-03-06 | End: 2025-03-06

## 2025-03-06 RX ORDER — MEPERIDINE HYDROCHLORIDE 50 MG/ML
25 INJECTION INTRAMUSCULAR; INTRAVENOUS; SUBCUTANEOUS
Status: DISCONTINUED | OUTPATIENT
Start: 2025-03-06 | End: 2025-03-07

## 2025-03-06 RX ORDER — POTASSIUM CHLORIDE 29.8 MG/ML
20 INJECTION INTRAVENOUS ONCE
Status: COMPLETED | OUTPATIENT
Start: 2025-03-07 | End: 2025-03-07

## 2025-03-06 RX ORDER — CHLORHEXIDINE GLUCONATE 500 MG/1
1 CLOTH TOPICAL EVERY 24 HOURS
Status: DISCONTINUED | OUTPATIENT
Start: 2025-03-07 | End: 2025-03-09

## 2025-03-06 RX ORDER — PHENYLEPHRINE HCL IN 0.9% NACL 50MG/250ML
.5-3 PLASTIC BAG, INJECTION (ML) INTRAVENOUS CONTINUOUS PRN
Status: DISCONTINUED | OUTPATIENT
Start: 2025-03-06 | End: 2025-03-08

## 2025-03-06 RX ORDER — CHLORHEXIDINE GLUCONATE 500 MG/1
CLOTH TOPICAL EVERY 12 HOURS PRN
Status: DISCONTINUED | OUTPATIENT
Start: 2025-03-06 | End: 2025-03-09

## 2025-03-06 RX ORDER — NICOTINE POLACRILEX 4 MG
15 LOZENGE BUCCAL
Status: DISCONTINUED | OUTPATIENT
Start: 2025-03-06 | End: 2025-03-07

## 2025-03-06 RX ORDER — NICOTINE POLACRILEX 4 MG
15 LOZENGE BUCCAL
Status: DISCONTINUED | OUTPATIENT
Start: 2025-03-06 | End: 2025-03-06 | Stop reason: HOSPADM

## 2025-03-06 RX ORDER — POTASSIUM CHLORIDE, DEXTROSE MONOHYDRATE 150; 5 MG/100ML; G/100ML
30 INJECTION, SOLUTION INTRAVENOUS CONTINUOUS
Status: DISCONTINUED | OUTPATIENT
Start: 2025-03-06 | End: 2025-03-08

## 2025-03-06 RX ORDER — DEXMEDETOMIDINE HYDROCHLORIDE 4 UG/ML
.2-1.5 INJECTION, SOLUTION INTRAVENOUS
Status: DISCONTINUED | OUTPATIENT
Start: 2025-03-06 | End: 2025-03-07

## 2025-03-06 RX ORDER — LIDOCAINE HYDROCHLORIDE 20 MG/ML
INJECTION, SOLUTION EPIDURAL; INFILTRATION; INTRACAUDAL; PERINEURAL AS NEEDED
Status: DISCONTINUED | OUTPATIENT
Start: 2025-03-06 | End: 2025-03-06 | Stop reason: SURG

## 2025-03-06 RX ORDER — ALPRAZOLAM 0.25 MG
.125-.25 TABLET ORAL NIGHTLY PRN
COMMUNITY

## 2025-03-06 RX ORDER — PROPOFOL 10 MG/ML
VIAL (ML) INTRAVENOUS AS NEEDED
Status: DISCONTINUED | OUTPATIENT
Start: 2025-03-06 | End: 2025-03-06 | Stop reason: SURG

## 2025-03-06 RX ORDER — PREGABALIN 25 MG/1
25 CAPSULE ORAL ONCE
Status: COMPLETED | OUTPATIENT
Start: 2025-03-06 | End: 2025-03-06

## 2025-03-06 RX ORDER — VECURONIUM BROMIDE 1 MG/ML
INJECTION, POWDER, LYOPHILIZED, FOR SOLUTION INTRAVENOUS AS NEEDED
Status: DISCONTINUED | OUTPATIENT
Start: 2025-03-06 | End: 2025-03-06 | Stop reason: SURG

## 2025-03-06 RX ORDER — ASPIRIN 81 MG/1
162 TABLET, CHEWABLE ORAL ONCE
Status: COMPLETED | OUTPATIENT
Start: 2025-03-07 | End: 2025-03-07

## 2025-03-06 RX ORDER — ACETAMINOPHEN 500 MG
1000 TABLET ORAL ONCE
Status: COMPLETED | OUTPATIENT
Start: 2025-03-06 | End: 2025-03-06

## 2025-03-06 RX ORDER — METOPROLOL TARTRATE 25 MG/1
12.5 TABLET, FILM COATED ORAL EVERY 12 HOURS SCHEDULED
Status: DISCONTINUED | OUTPATIENT
Start: 2025-03-07 | End: 2025-03-08

## 2025-03-06 RX ORDER — HEPARIN SODIUM 1000 [USP'U]/ML
INJECTION, SOLUTION INTRAVENOUS; SUBCUTANEOUS AS NEEDED
Status: DISCONTINUED | OUTPATIENT
Start: 2025-03-06 | End: 2025-03-06 | Stop reason: SURG

## 2025-03-06 RX ORDER — CEFAZOLIN SODIUM 1 G/3ML
INJECTION, POWDER, FOR SOLUTION INTRAMUSCULAR; INTRAVENOUS AS NEEDED
Status: DISCONTINUED | OUTPATIENT
Start: 2025-03-06 | End: 2025-03-06 | Stop reason: SURG

## 2025-03-06 RX ORDER — ACETAMINOPHEN 500 MG
1000 TABLET ORAL EVERY 6 HOURS
Status: DISCONTINUED | OUTPATIENT
Start: 2025-03-07 | End: 2025-03-08

## 2025-03-06 RX ORDER — VANCOMYCIN/0.9 % SOD CHLORIDE 750 MG/250
750 PLASTIC BAG, INJECTION (ML) INTRAVENOUS EVERY 12 HOURS
Status: COMPLETED | OUTPATIENT
Start: 2025-03-06 | End: 2025-03-07

## 2025-03-06 RX ORDER — METHYLPREDNISOLONE SODIUM SUCCINATE 125 MG/2ML
INJECTION, POWDER, LYOPHILIZED, FOR SOLUTION INTRAMUSCULAR; INTRAVENOUS AS NEEDED
Status: DISCONTINUED | OUTPATIENT
Start: 2025-03-06 | End: 2025-03-06 | Stop reason: SURG

## 2025-03-06 RX ORDER — MAGNESIUM HYDROXIDE 1200 MG/15ML
LIQUID ORAL AS NEEDED
Status: DISCONTINUED | OUTPATIENT
Start: 2025-03-06 | End: 2025-03-06 | Stop reason: HOSPADM

## 2025-03-06 RX ORDER — IBUPROFEN 600 MG/1
1 TABLET ORAL
Status: DISCONTINUED | OUTPATIENT
Start: 2025-03-06 | End: 2025-03-07

## 2025-03-06 RX ORDER — CHLORHEXIDINE GLUCONATE ORAL RINSE 1.2 MG/ML
15 SOLUTION DENTAL EVERY 12 HOURS
Status: DISCONTINUED | OUTPATIENT
Start: 2025-03-06 | End: 2025-03-09

## 2025-03-06 RX ORDER — CHLORHEXIDINE GLUCONATE 500 MG/1
1 CLOTH TOPICAL ONCE
Status: COMPLETED | OUTPATIENT
Start: 2025-03-06 | End: 2025-03-06

## 2025-03-06 RX ORDER — PANTOPRAZOLE SODIUM 40 MG/1
40 TABLET, DELAYED RELEASE ORAL EVERY MORNING
Status: DISCONTINUED | OUTPATIENT
Start: 2025-03-07 | End: 2025-03-07

## 2025-03-06 RX ORDER — MIDAZOLAM HYDROCHLORIDE 2 MG/2ML
2 INJECTION, SOLUTION INTRAMUSCULAR; INTRAVENOUS
Status: DISCONTINUED | OUTPATIENT
Start: 2025-03-06 | End: 2025-03-06 | Stop reason: HOSPADM

## 2025-03-06 RX ORDER — FENTANYL CITRATE 50 UG/ML
50 INJECTION, SOLUTION INTRAMUSCULAR; INTRAVENOUS
Status: DISCONTINUED | OUTPATIENT
Start: 2025-03-06 | End: 2025-03-07

## 2025-03-06 RX ORDER — OXYCODONE HYDROCHLORIDE 5 MG/1
5 TABLET ORAL
Status: DISCONTINUED | OUTPATIENT
Start: 2025-03-06 | End: 2025-03-10 | Stop reason: HOSPADM

## 2025-03-06 RX ORDER — LIDOCAINE HYDROCHLORIDE 10 MG/ML
0.5 INJECTION, SOLUTION EPIDURAL; INFILTRATION; INTRACAUDAL; PERINEURAL ONCE AS NEEDED
Status: DISCONTINUED | OUTPATIENT
Start: 2025-03-06 | End: 2025-03-06 | Stop reason: HOSPADM

## 2025-03-06 RX ORDER — ACETAMINOPHEN 160 MG/5ML
1000 SOLUTION ORAL EVERY 6 HOURS
Status: DISCONTINUED | OUTPATIENT
Start: 2025-03-07 | End: 2025-03-08

## 2025-03-06 RX ORDER — ROCURONIUM BROMIDE 10 MG/ML
INJECTION, SOLUTION INTRAVENOUS AS NEEDED
Status: DISCONTINUED | OUTPATIENT
Start: 2025-03-06 | End: 2025-03-06 | Stop reason: SURG

## 2025-03-06 RX ORDER — PANTOPRAZOLE SODIUM 40 MG/10ML
40 INJECTION, POWDER, LYOPHILIZED, FOR SOLUTION INTRAVENOUS ONCE
Status: COMPLETED | OUTPATIENT
Start: 2025-03-06 | End: 2025-03-06

## 2025-03-06 RX ORDER — ENOXAPARIN SODIUM 100 MG/ML
40 INJECTION SUBCUTANEOUS DAILY
Status: DISCONTINUED | OUTPATIENT
Start: 2025-03-07 | End: 2025-03-10 | Stop reason: HOSPADM

## 2025-03-06 RX ORDER — MIDAZOLAM HYDROCHLORIDE 5 MG/5ML
INJECTION, SOLUTION INTRAMUSCULAR; INTRAVENOUS AS NEEDED
Status: DISCONTINUED | OUTPATIENT
Start: 2025-03-06 | End: 2025-03-06 | Stop reason: SURG

## 2025-03-06 RX ORDER — ACETAMINOPHEN 10 MG/ML
1000 INJECTION, SOLUTION INTRAVENOUS EVERY 8 HOURS
Status: COMPLETED | OUTPATIENT
Start: 2025-03-06 | End: 2025-03-07

## 2025-03-06 RX ORDER — SODIUM CHLORIDE 0.9 % (FLUSH) 0.9 %
30 SYRINGE (ML) INJECTION ONCE AS NEEDED
Status: DISCONTINUED | OUTPATIENT
Start: 2025-03-06 | End: 2025-03-06 | Stop reason: HOSPADM

## 2025-03-06 RX ORDER — FENTANYL CITRATE 50 UG/ML
25 INJECTION, SOLUTION INTRAMUSCULAR; INTRAVENOUS
Status: DISCONTINUED | OUTPATIENT
Start: 2025-03-06 | End: 2025-03-07

## 2025-03-06 RX ORDER — CHLORHEXIDINE GLUCONATE 500 MG/1
CLOTH TOPICAL EVERY 12 HOURS PRN
Status: DISCONTINUED | OUTPATIENT
Start: 2025-03-06 | End: 2025-03-06 | Stop reason: HOSPADM

## 2025-03-06 RX ORDER — SODIUM CHLORIDE, SODIUM LACTATE, POTASSIUM CHLORIDE, CALCIUM CHLORIDE 600; 310; 30; 20 MG/100ML; MG/100ML; MG/100ML; MG/100ML
100 INJECTION, SOLUTION INTRAVENOUS CONTINUOUS
Status: DISCONTINUED | OUTPATIENT
Start: 2025-03-06 | End: 2025-03-06

## 2025-03-06 RX ORDER — CLOPIDOGREL BISULFATE 75 MG/1
75 TABLET ORAL DAILY
Status: DISCONTINUED | OUTPATIENT
Start: 2025-03-07 | End: 2025-03-10 | Stop reason: HOSPADM

## 2025-03-06 RX ORDER — AMIODARONE HYDROCHLORIDE 200 MG/1
400 TABLET ORAL 2 TIMES DAILY WITH MEALS
Status: DISCONTINUED | OUTPATIENT
Start: 2025-03-07 | End: 2025-03-10 | Stop reason: HOSPADM

## 2025-03-06 RX ORDER — PREGABALIN 25 MG/1
25 CAPSULE ORAL EVERY 12 HOURS
Status: DISCONTINUED | OUTPATIENT
Start: 2025-03-07 | End: 2025-03-07

## 2025-03-06 RX ORDER — ALBUTEROL SULFATE 0.83 MG/ML
2.5 SOLUTION RESPIRATORY (INHALATION) EVERY 4 HOURS PRN
Status: ACTIVE | OUTPATIENT
Start: 2025-03-06 | End: 2025-03-07

## 2025-03-06 RX ORDER — POLYETHYLENE GLYCOL 3350 17 G/17G
17 POWDER, FOR SOLUTION ORAL DAILY
Status: DISCONTINUED | OUTPATIENT
Start: 2025-03-07 | End: 2025-03-10 | Stop reason: HOSPADM

## 2025-03-06 RX ORDER — ASPIRIN 81 MG/1
81 TABLET ORAL DAILY
Status: DISCONTINUED | OUTPATIENT
Start: 2025-03-08 | End: 2025-03-10 | Stop reason: HOSPADM

## 2025-03-06 RX ORDER — SODIUM CHLORIDE 9 MG/ML
30 INJECTION, SOLUTION INTRAVENOUS CONTINUOUS PRN
Status: DISCONTINUED | OUTPATIENT
Start: 2025-03-06 | End: 2025-03-06 | Stop reason: HOSPADM

## 2025-03-06 RX ORDER — ACETAMINOPHEN 650 MG/1
650 SUPPOSITORY RECTAL EVERY 6 HOURS
Status: DISCONTINUED | OUTPATIENT
Start: 2025-03-07 | End: 2025-03-08

## 2025-03-06 RX ADMIN — FENTANYL CITRATE 150 MCG: 50 INJECTION, SOLUTION INTRAMUSCULAR; INTRAVENOUS at 08:48

## 2025-03-06 RX ADMIN — VECURONIUM BROMIDE 10 MG: 1 INJECTION, POWDER, LYOPHILIZED, FOR SOLUTION INTRAVENOUS at 12:58

## 2025-03-06 RX ADMIN — Medication 1000 MG: at 06:32

## 2025-03-06 RX ADMIN — POTASSIUM CHLORIDE 20 MEQ: 29.8 INJECTION, SOLUTION INTRAVENOUS at 23:26

## 2025-03-06 RX ADMIN — ROCURONIUM 100 MG: 50 INJECTION, SOLUTION INTRAVENOUS at 07:40

## 2025-03-06 RX ADMIN — FENTANYL CITRATE 100 MCG: 50 INJECTION, SOLUTION INTRAMUSCULAR; INTRAVENOUS at 08:58

## 2025-03-06 RX ADMIN — CHLORHEXIDINE GLUCONATE 1 APPLICATION: 500 CLOTH TOPICAL at 14:37

## 2025-03-06 RX ADMIN — SUGAMMADEX 200 MG: 100 INJECTION, SOLUTION INTRAVENOUS at 13:55

## 2025-03-06 RX ADMIN — CALCIUM CHLORIDE 0.4 G: 100 INJECTION INTRAVENOUS; INTRAVENTRICULAR at 12:28

## 2025-03-06 RX ADMIN — CEFAZOLIN 2 G: 330 INJECTION, POWDER, FOR SOLUTION INTRAMUSCULAR; INTRAVENOUS at 08:30

## 2025-03-06 RX ADMIN — CHLORHEXIDINE GLUCONATE 0.12% ORAL RINSE 15 ML: 1.2 LIQUID ORAL at 17:02

## 2025-03-06 RX ADMIN — AMINOCAPROIC ACID 1 G/HR: 250 INJECTION, SOLUTION INTRAVENOUS at 08:05

## 2025-03-06 RX ADMIN — VECURONIUM BROMIDE 10 MG: 1 INJECTION, POWDER, LYOPHILIZED, FOR SOLUTION INTRAVENOUS at 10:18

## 2025-03-06 RX ADMIN — FENTANYL CITRATE 25 MCG: 50 INJECTION, SOLUTION INTRAMUSCULAR; INTRAVENOUS at 22:58

## 2025-03-06 RX ADMIN — MIDAZOLAM HYDROCHLORIDE 1 MG: 1 INJECTION, SOLUTION INTRAMUSCULAR; INTRAVENOUS at 10:08

## 2025-03-06 RX ADMIN — NICARDIPINE HYDROCHLORIDE 5 MG/HR: 0.1 INJECTION INTRAVENOUS at 14:52

## 2025-03-06 RX ADMIN — FENTANYL CITRATE 400 MCG: 50 INJECTION, SOLUTION INTRAMUSCULAR; INTRAVENOUS at 07:40

## 2025-03-06 RX ADMIN — CEFAZOLIN 2 G: 2 INJECTION, POWDER, FOR SOLUTION INTRAMUSCULAR; INTRAVENOUS at 20:51

## 2025-03-06 RX ADMIN — SODIUM CHLORIDE, POTASSIUM CHLORIDE, SODIUM LACTATE AND CALCIUM CHLORIDE: 600; 310; 30; 20 INJECTION, SOLUTION INTRAVENOUS at 07:45

## 2025-03-06 RX ADMIN — POTASSIUM CHLORIDE 20 MEQ: 29.8 INJECTION, SOLUTION INTRAVENOUS at 15:46

## 2025-03-06 RX ADMIN — PHENYLEPHRINE HYDROCHLORIDE 0.1 MCG/KG/MIN: 10 INJECTION INTRAVENOUS at 18:39

## 2025-03-06 RX ADMIN — MIDAZOLAM HYDROCHLORIDE 2 MG: 1 INJECTION, SOLUTION INTRAMUSCULAR; INTRAVENOUS at 06:36

## 2025-03-06 RX ADMIN — ACETAMINOPHEN 1000 MG: 10 INJECTION, SOLUTION INTRAVENOUS at 14:37

## 2025-03-06 RX ADMIN — AMINOCAPROIC ACID 5 G: 250 INJECTION, SOLUTION INTRAVENOUS at 08:06

## 2025-03-06 RX ADMIN — FENTANYL CITRATE 100 MCG: 50 INJECTION, SOLUTION INTRAMUSCULAR; INTRAVENOUS at 13:34

## 2025-03-06 RX ADMIN — POTASSIUM CHLORIDE AND DEXTROSE MONOHYDRATE 30 ML/HR: 150; 5 INJECTION, SOLUTION INTRAVENOUS at 15:14

## 2025-03-06 RX ADMIN — FENTANYL CITRATE 100 MCG: 50 INJECTION, SOLUTION INTRAMUSCULAR; INTRAVENOUS at 08:40

## 2025-03-06 RX ADMIN — MIDAZOLAM HYDROCHLORIDE 2 MG: 1 INJECTION, SOLUTION INTRAMUSCULAR; INTRAVENOUS at 12:08

## 2025-03-06 RX ADMIN — Medication 750 MG: at 17:36

## 2025-03-06 RX ADMIN — LIDOCAINE 2 PATCH: 4 PATCH TOPICAL at 14:39

## 2025-03-06 RX ADMIN — MIDAZOLAM HYDROCHLORIDE 2 MG: 1 INJECTION, SOLUTION INTRAMUSCULAR; INTRAVENOUS at 07:40

## 2025-03-06 RX ADMIN — AMIODARONE HYDROCHLORIDE 1 MG/MIN: 1.8 INJECTION, SOLUTION INTRAVENOUS at 22:46

## 2025-03-06 RX ADMIN — FENTANYL CITRATE 50 MCG: 50 INJECTION, SOLUTION INTRAMUSCULAR; INTRAVENOUS at 12:58

## 2025-03-06 RX ADMIN — PROPOFOL 100 MG: 10 INJECTION, EMULSION INTRAVENOUS at 10:11

## 2025-03-06 RX ADMIN — METOPROLOL TARTRATE 1 MG: 5 INJECTION INTRAVENOUS at 08:34

## 2025-03-06 RX ADMIN — PREGABALIN 25 MG: 25 CAPSULE ORAL at 06:23

## 2025-03-06 RX ADMIN — PANTOPRAZOLE SODIUM 40 MG: 40 INJECTION, POWDER, FOR SOLUTION INTRAVENOUS at 14:37

## 2025-03-06 RX ADMIN — IPRATROPIUM BROMIDE AND ALBUTEROL SULFATE 1.5 ML: .5; 3 SOLUTION RESPIRATORY (INHALATION) at 14:33

## 2025-03-06 RX ADMIN — LIDOCAINE HYDROCHLORIDE 100 MG: 20 INJECTION, SOLUTION EPIDURAL; INFILTRATION; INTRACAUDAL; PERINEURAL at 07:40

## 2025-03-06 RX ADMIN — METHYLPREDNISOLONE SODIUM SUCCINATE 1000 MG: 125 INJECTION, POWDER, FOR SOLUTION INTRAMUSCULAR; INTRAVENOUS at 08:29

## 2025-03-06 RX ADMIN — SODIUM CHLORIDE, SODIUM LACTATE, POTASSIUM CHLORIDE, CALCIUM CHLORIDE 1000 ML: 20; 30; 600; 310 INJECTION, SOLUTION INTRAVENOUS at 06:30

## 2025-03-06 RX ADMIN — VECURONIUM BROMIDE 10 MG: 1 INJECTION, POWDER, LYOPHILIZED, FOR SOLUTION INTRAVENOUS at 08:38

## 2025-03-06 RX ADMIN — ACETAMINOPHEN 1000 MG: 10 INJECTION, SOLUTION INTRAVENOUS at 21:32

## 2025-03-06 RX ADMIN — NICARDIPINE HYDROCHLORIDE 5 MG/HR: 0.1 INJECTION INTRAVENOUS at 14:39

## 2025-03-06 RX ADMIN — PROPOFOL 50 MG: 10 INJECTION, EMULSION INTRAVENOUS at 07:40

## 2025-03-06 RX ADMIN — FENTANYL CITRATE 100 MCG: 50 INJECTION, SOLUTION INTRAMUSCULAR; INTRAVENOUS at 08:33

## 2025-03-06 RX ADMIN — PROPOFOL 150 MG: 10 INJECTION, EMULSION INTRAVENOUS at 10:08

## 2025-03-06 RX ADMIN — AMIODARONE HYDROCHLORIDE 1 MG/MIN: 1.8 INJECTION, SOLUTION INTRAVENOUS at 17:15

## 2025-03-06 RX ADMIN — FENTANYL CITRATE 100 MCG: 50 INJECTION, SOLUTION INTRAMUSCULAR; INTRAVENOUS at 10:11

## 2025-03-06 RX ADMIN — POTASSIUM CHLORIDE 20 MEQ: 29.8 INJECTION, SOLUTION INTRAVENOUS at 16:39

## 2025-03-06 RX ADMIN — DEXMEDETOMIDINE HYDROCHLORIDE 0.5 MCG/KG/HR: 4 INJECTION, SOLUTION INTRAVENOUS at 13:03

## 2025-03-06 RX ADMIN — SODIUM CHLORIDE 1 UNITS/HR: 9 INJECTION, SOLUTION INTRAVENOUS at 12:17

## 2025-03-06 RX ADMIN — ACETAMINOPHEN 1000 MG: 500 TABLET, FILM COATED ORAL at 06:23

## 2025-03-06 RX ADMIN — Medication 1 APPLICATION: at 17:02

## 2025-03-06 RX ADMIN — CALCIUM CHLORIDE 1000 MG: 100 INJECTION INTRAVENOUS; INTRAVENTRICULAR at 22:35

## 2025-03-06 RX ADMIN — CEFAZOLIN 2 G: 330 INJECTION, POWDER, FOR SOLUTION INTRAMUSCULAR; INTRAVENOUS at 12:29

## 2025-03-06 RX ADMIN — HEPARIN SODIUM 24000 UNITS: 1000 INJECTION, SOLUTION INTRAVENOUS; SUBCUTANEOUS at 09:04

## 2025-03-06 RX ADMIN — MUPIROCIN: 20 OINTMENT TOPICAL at 06:23

## 2025-03-06 NOTE — H&P
Patient ID: Mary Salazar is a 69 y.o. female who is here for evaluation of aortic valve stenosis.          Chief Complaint   Patient presents with    Severe Aortic Stenosis       New TAVR pt referred from Dr. Randhawa      History of Present Illness  The patient presents for evaluation of aortic valve stenosis.  She is accompanied by her .     She reports experiencing dyspnea, ankle edema, and mild fatigue, which have significantly worsened over the past year. She has not sought medical attention for heart failure.  She expresses concern about potential exposure to influenza, as her daughter-in-law recently contracted the virus, and her grandson is currently infected. She has been avoiding social contact to minimize her risk. She has not received the COVID-19 vaccine. She maintains good oral hygiene, with dental check-ups every 6 months, the most recent being 2 months ago.     She also suffers from severe neuropathy in her feet, which she suspects may be contributing to the swelling. The edema is most pronounced during periods of prolonged sitting, particularly at night. She is able to lie flat without difficulty but prefers not to sleep on her back due to perceived respiratory discomfort. She does not experience episodes of breathlessness at night that require her to sit up.     Discussed with structural heart team and recommendation was to proceed forward with aneurysmectomy as well as aortic valve replacement and management of left atrial appendage.  Patient verbalized understanding and provides agreement to recommendation.  No change clinically.    \Supplemental Information  She has a history of hysterectomy.     SOCIAL HISTORY  She is a non-smoker.     FAMILY HISTORY  Her mother had 2 aneurysms, one in her stomach and another unspecified location, and  of a heart attack. Her father had an aneurysm, but the location is unknown. Most of her mother's sisters had heart problems.     IMMUNIZATIONS  She has  "not received the COVID-19 vaccine.     The following portions of the patient's history were reviewed and updated as appropriate: allergies, current medications, past family history, past medical history, past social history, past surgical history and problem list.     Medical History        Past Medical History:   Diagnosis Date    Aneurysm      Congenital heart disease      Heart murmur      Heart valve disease      Hypertension              Surgical History         Past Surgical History:   Procedure Laterality Date    CARDIAC CATHETERIZATION N/A 12/28/2021     Procedure: Left Heart Cath;  Surgeon: Alexis Randhawa MD;  Location:  [facility];  Service: Cardiology;  Laterality: N/A;            Social History   Social History            Socioeconomic History    Marital status:    Tobacco Use    Smoking status: Never       Passive exposure: Past (\"Not long\" about 49 years ago - pt's spouse smoked outside)    Smokeless tobacco: Never   Vaping Use    Vaping status: Never Used   Substance and Sexual Activity    Alcohol use: Never    Drug use: Never    Sexual activity: Defer                  Family History   Problem Relation Age of Onset    Heart attack Mother      Heart attack Father      Hypertension Brother      Heart attack Maternal Grandmother      Hypertension Brother                 Objective      Visit Vitals  BP (!) 170/101 (BP Location: Left arm, Patient Position: Sitting)   Pulse 63   Temp 96.5 °F (35.8 °C) (Temporal)   Resp 18   Ht 165 cm (64.96\")   Wt 67.8 kg (149 lb 7.6 oz)   SpO2 97%   BMI 24.90 kg/m²       Physical Exam  Physical Exam  The patient is in no acute distress, appears appropriate.  Lungs are clear to auscultation bilaterally without wheezing, rubs, or rales.  Heart has a regular rate and rhythm. There is a murmur that is 3 out of 6 without rubs or gallops.  Abdomen is soft and nontender.  There is no clubbing, cyanosis, or edema in the extremities.        CT Angio TAVR " Chest Abdomen Pelvis     Result Date: 1/30/2025  Narrative: CT ANGIO TAVR CHEST ABDOMEN PELVIS- 1/29/2025 10:55 AM  HISTORY: pre op AVR; I35.0-Nonrheumatic aortic (valve) stenosis  COMPARISON: None  DOSE LENGTH PRODUCT: 984.27 mGy.cm Automated exposure control was also utilized to decrease patient radiation dose.  TECHNIQUE: Helical tomographic images of the chest, abdomen and pelvis utilizing angiographic protocol were obtained following the intravenous infusion of contrast, pre-TAVR assessment. Multiplanar and 3D reformatted images were provided for review. Volume rendered images were also generated. Measurements provided by [facility] laboratories using [employer].  FINDINGS:  ANGIOGRAM:  Ascending thoracic aorta measuring up to 4.3 cm. No dissection. Minimal atherosclerosis. Great vessels are patent.  Abdominal aorta and iliac vasculature is normal in caliber with minimal atherosclerosis. No dissection. Replaced common hepatic artery off the SMA. Celiac axis is patent. SMA is patent. KRISTINA is patent. There appear to be 2 bilateral renal arteries which are patent. No high-grade flow-limiting stenosis.  CHEST:  Airways/Lungs/Pleura: No consolidation or pleural effusion. No suspicious pulmonary nodule. Mild peripheral atelectasis/scarring.  Lower Neck: Unremarkable.  Mediastinum/Hilum: No enlarged lymphadenopathy.  Heart/Vasculature: No pericardial effusion. Moderate aortic valvular calcifications.  Chest wall/Soft Tissues: Unremarkable.  Osseous Structures: No acute or suspicious osseous finding.   ABDOMEN/ PELVIS:  Liver: Unremarkable.  Biliary Tree: Cholelithiasis.  Spleen: Unremarkable.  Pancreas: Unremarkable.  Adrenal Glands: Focal 9 mm areas of adrenal thickening bilaterally, likely hyperplasia.  Kidneys/Ureters/Bladder: Right renal cyst.  Reproductive Organs: Presumed prior hysterectomy.  Gastrointestinal Tract: Couple small distal appendicoliths. No evidence of acute appendicitis.  Lymphatics: Unremarkable.   Vasculature: No additional findings.  Peritoneum/Retroperitoneum: Unremarkable.  Abdominal Wall/Soft Tissues: Tiny fat-containing umbilical hernia.  Osseous Structures: No acute or suspicious findings.        Impression: 1. TAVR screening report generated by [facility] is included in PACS as an additional series and is available for review. 2. Dilated ascending thoracic aorta (4.3 cm). 3. Cholelithiasis.    This report was signed and finalized on 1/30/2025 9:01 AM by Truman Leggett.   This study was performed on the day of this office visit.  The interpretation was performed by radiology the next day.    This is enclosed for completeness      Results  Imaging  CT scan shows ascending aorta measures 4.5 cm in size in greatest dimension in the short access.  Right common femoral artery measures 8 mm in size. Left common femoral artery measures 7 mm in size.  Her root is amenable for implant of a percutaneous valve.         Diagnoses and all orders for this visit:     1. Aortic stenosis, severe (Primary)     2. Bicuspid aortic valve     3. Aneurysm of ascending aorta without rupture                 Assessment & Plan  Assessment & Plan  1. Severe symptomatic aortic valve stenosis.  The natural progression of aortic valve stenosis was discussed. After reviewing her echocardiography results, it was determined that she meets the criteria for valve therapy. Treatment options, including medical management, TAVR, and SAVR, were explored. Based on her recent TAVR CT scan, she appears to be a suitable candidate for a TAVR approach. She is highly functional with minimal medical issues and has a family history of longevity, with several members living to be 90 years old. Given her age, a two-valve solution with SAVR first, followed by a potential TAVR, is a viable option. The presence of an aneurysm suggests that surgical management may be more appropriate. The rationale behind these recommendations was explained including  the fact that she has a bicuspid valve. The operative conduct for both the surgical and medical management solutions was discussed. Our structural heart team approach was also discussed, with the intent to consult with Dr. Randhawa and our colleagues this Friday. She will be contacted on Friday afternoon to discuss the group's opinion. She was informed that she is free to choose either option and that the team will support her decision, provided she fully understands the pros, cons, and risks of each option. Following today's comprehensive assessment, she is deemed an informed consumer capable of making a suitable decision.       Discussed the recommendation for she to undergo aortic valve replacement, ascending aortic replacement, with possible hemiarch resection utilizing deep hypothermic circulatory arrest with exclusion of the left atrial appendage.  Discussed the operative conduct and expected postoperative course.  Discussed the risks of the procedure to include but not limited to bleeding, infection, stroke, heart attack, anesthesia risk, organ dysfunction and/or failure, prolonged mechanical ventilation, prolonged ICU stay, chronic pain syndromes, sternal nonunion, need for pacemaker, and/or death.  All questions have been answered to the best my ability and she is agreeable to the forementioned plan.  STS risk analysis was used for risk discussion for the basis of aortic valve.  Common risk of aneurysmectomy as understood in the literature was used to provide formal risk data discussion.    I discussed the available prosthetic valvular options including bioprosthetic versus mechanical.  She certainly agreeable to have a bovine pericardial tissue valve.    All questions have been answered best ability she has provided consent.  She has verbalized understanding.      She is a non-smoker and was congratulated on this.     2. Ascending aortic aneurysm.  The incidental finding of an ascending aortic aneurysm  measuring 4.5 cm adds complexity to the decision-making process. It was discussed that it would not be unreasonable to consider surgical aortic valve replacement and exclusion of the left atrial appendage as a reasonable option. An alternative treatment plan could be to proceed with TAVR and continue ongoing aortic surveillance of her ascending aortic aneurysm. The potential risks and benefits of both approaches were discussed. She was informed that the aneurysm could be managed surgically, which might strengthen the argument for a surgical solution now rather than later.     She is a non smoker.     Many thanks for the opportunity to care for your patient.     I will continue to keep you apprised of provided care as it ensues.      All questions have been answered to the best of my ability and she has provided consent.  She is clinically unchanged.  DW her  this am to clarify the initial consent and to perform this a new consent was performed. He signed on her behalf as she received versed with he verbaliizing understanding.

## 2025-03-06 NOTE — ANESTHESIA PROCEDURE NOTES
Arterial Line      Patient reassessed immediately prior to procedure    Patient location during procedure: pre-op  Start time: 3/6/2025 6:45 AM   Line placed for hemodynamic monitoring, ABGs/Labs/ISTAT and MD/Surgeon request.  Performed By   Anesthesiologist: Sonja Pierre MD   Preanesthetic Checklist  Completed: patient identified, IV checked, site marked, risks and benefits discussed, surgical consent, monitors and equipment checked, pre-op evaluation and timeout performed  Arterial Line Prep    Sterile Tech: gloves and sterile barriers  Prep: ChloraPrep  Patient monitoring: blood pressure monitoring, continuous pulse oximetry and EKG  Arterial Line Procedure   Laterality:left  Location:  radial artery  Catheter size: 20 G   Guidance: ultrasound guided  Number of attempts: 1  Successful placement: yes   Post Assessment   Dressing Type: occlusive dressing applied, secured with tape and wrist guard applied.   Complications no  Circ/Move/Sens Assessment: normal and unchanged.   Patient Tolerance: patient tolerated the procedure well with no apparent complications

## 2025-03-06 NOTE — ANESTHESIA PROCEDURE NOTES
Airway  Urgency: elective    Date/Time: 3/6/2025 7:44 AM  Airway not difficult    General Information and Staff    Patient location during procedure: OR    Indications and Patient Condition  Indications for airway management: airway protection    Preoxygenated: yes  Mask difficulty assessment: 1 - vent by mask    Final Airway Details  Final airway type: endotracheal airway      Successful airway: ETT  Cuffed: yes   Successful intubation technique: video laryngoscopy  Endotracheal tube insertion site: oral  Blade: So  Blade size: 3  ETT size (mm): 7.0  Cormack-Lehane Classification: grade I - full view of glottis  Placement verified by: chest auscultation and capnometry   Measured from: teeth  ETT/EBT  to teeth (cm): 21  Number of attempts at approach: 1  Assessment: lips, teeth, and gum same as pre-op and atraumatic intubation

## 2025-03-06 NOTE — ANESTHESIA PROCEDURE NOTES
Intra-Op Anesthesia JESSICA    Procedure Performed: Intra-Op Anesthesia JESSICA       Start Time:  3/6/2025 7:57 AM       End Time:      Preanesthesia Checklist:  Patient identified, IV assessed, risks and benefits discussed, monitors and equipment assessed, procedure being performed at surgeon's request and anesthesia consent obtained.    General Procedure Information  JESSICA Placed for monitoring purposes only -- This is not a diagnostic JESSICA  Diagnostic Indications for Echo:  assessment of surgical repair, defect repair evaluation and hemodynamic monitoring  Physician Requesting Echo: Marck Clark MD  Location performed:  OR  Intubated  Bite block not placed  Heart visualized  Probe Insertion:  Easy  Probe Type:  Multiplane  Modalities:  2D only, color flow mapping and continuous wave Doppler        Anesthesia Information  Performed Personally  Anesthesiologist:  Sonja Pierre MD      Echocardiogram Comments:       JESSICA placed at surgeon request.     Please see cardiology diagnostic evaluation for complete report.

## 2025-03-06 NOTE — ANESTHESIA POSTPROCEDURE EVALUATION
Patient: Mary Salazar    Procedure Summary       Date: 03/06/25 Room / Location:  [facility] OR  /  [facility] OR    Anesthesia Start: 0735 Anesthesia Stop: 1356    Procedures:       AORTIC VALVE REPLACEMENT (CE INSPIRIS 21MM VALVE), ASCENDING AORTIC ANEURYSM REPAIR, HEMIARCH AORTIC RESECTION WITH DEEP HYPOTHERMIC CIRCULATORY ARREST, ATRIAL APPENDAGE EXCLUSION LEFT 35MM, APPLICATION PREVENA, ULTRASOUND GUIDED FEMORAL ARTERIAL LINE INSERTION, TRANSESOPHAGEAL ECHOCARDIOGRAM (Chest)      AORTIC VALVE ASCENDING ANEURYSM REPAIR (Chest) Diagnosis:       Aortic stenosis, severe      Aneurysm of ascending aorta without rupture      (Aortic stenosis, severe [I35.0])      (Aneurysm of ascending aorta without rupture [I71.21])    Surgeons: Marck Clark MD Provider: Ryan Clement CRNA    Anesthesia Type: general ASA Status: 4            Anesthesia Type: general    Vitals  Vitals Value Taken Time   /66 03/06/25 1546   Temp 95.9 °F (35.5 °C) 03/06/25 1500   Pulse 96 03/06/25 1556   Resp 20 03/06/25 1500   SpO2 94 % 03/06/25 1556   Vitals shown include unfiled device data.        Post Anesthesia Care and Evaluation    Patient location during evaluation: ICU  Patient participation: complete - patient cannot participate  Post-procedure mental status: sedated.  Pain score: 0  Pain management: adequate    Airway patency: patent  Anesthetic complications: No anesthetic complications  PONV Status: none  Cardiovascular status: acceptable and hemodynamically stable  Respiratory status: acceptable, ETT and intubated  Hydration status: acceptable  No anesthesia care post op

## 2025-03-06 NOTE — SIGNIFICANT NOTE
03/06/25 1406   Readings   Plateau Pressure (cm H2O) 20 cm H2O   Driving Pressure (cm H2O) 14.6 cm H2O   Static Compliance (L/cm H2O) 31

## 2025-03-06 NOTE — ANESTHESIA PROCEDURE NOTES
Central Line      Patient reassessed immediately prior to procedure    Patient location during procedure: OR  Start time: 3/6/2025 7:47 AM  Indications: vascular access, MD/Surgeon request and central pressure monitoring  Staff  Anesthesiologist: Sonja Pierre MD  Preanesthetic Checklist  Completed: patient identified, IV checked, site marked, risks and benefits discussed, surgical consent, monitors and equipment checked, pre-op evaluation and timeout performed  Central Line Prep  Sterile Tech:cap, gloves, gown, mask and sterile barriers  Prep: chloraprep  Patient monitoring: blood pressure monitoring, continuous pulse oximetry and EKG  Central Line Procedure  Laterality:right  Location:internal jugular  Catheter Type:MAC and [place]-Serafin  Catheter Size:9 Fr  Guidance:ultrasound guided  PROCEDURE NOTE/ULTRASOUND INTERPRETATION.  Using ultrasound guidance the potential vascular sites for insertion of the catheter were visualized to determine the patency of the vessel to be used for vascular access.  After selecting the appropriate site for insertion, the needle was visualized under ultrasound being inserted into the internal jugular vein, followed by ultrasound confirmation of wire and catheter placement. There were no abnormalities seen on ultrasound; an image was taken; and the patient tolerated the procedure with no complications.   Assessment  Post procedure:biopatch applied, line sutured and occlusive dressing applied  Assessement:blood return through all ports and chest x-ray ordered  Complications:no  Patient Tolerance:patient tolerated the procedure well with no apparent complications  Additional Notes  [place] secured at 48 cm

## 2025-03-06 NOTE — OP NOTE
.Cardiothoracic Surgery   Op Note   Patient Name:  Mary Salazar  YOB: 1955    Date of Surgery:  3/6/2025    Preoperative diagnosis:  Ascending aortic aneurysm  Aortic valve stenosis  Hypertension    Postoperative diagnosis:              Same      Procedure:    1.  Aortic hemiarch and ascending aortic replacement with deep hypothermic circulatory arrest  2.  Aortic valve replacement (CE Inspiris 21 mm valve)  3.  Left atrial appendage exclusion (Atricure Atriclip 35 mm device)  4.  Ultrasound guided insertion of right femoral arterial line  5. Application of Prevena Incision management system     Surgeon: Marck Clark MD  Assistant: Lemuel Carty  Anesthesia:  Sonja Pierre MD  Estimated blood loss:  (Cell Saver)  Drains:  24 Azeri Garrett drains-anterior and posterior mediastinum  Specimens: 1) proximal arch and ascending aorta and 2) aortic valve leaflets         Operative findings: Effective bicuspid aortic valve with fusion of the left and right aortic valve leaflets.  No evidence of endocarditis.  Post aortic valve replacement there is no evidence of paravalvular leak and a well functioning aortic valve prosthesis.  The mean gradient post valve replacement measured 6 mmHg.  Ventricular function is preserved.  Via ultrasound, the left atrial appendage was excluded in its entirety.     Total circulatory arrest time: 19 min  Total aortic cross clamp time: 131 min  Total cardiopulmonary bypass time: 184 min     Operative description in detail:  The patient was taken to the operative suite where she was placed in a supine position.  Induction of general anesthesia and placement of a single-lumen endotracheal tube was performed without remark.  Appropriate arterial and venous access was established without remark.  Through the previously placed right internal jugular central venous line, a pulmonary artery catheter was floated into position.  The patient was then prepped and draped in the  usual and sterile surgical fashion.  A timeout was performed.  Perioperative antibiotics were administered and a Beta blocker was given.  The right femoral artery was located with US.  This was accessed with a large bore needle using modified Seldinger technique under direct visualization.  Pulsatile and bright red blood was appreciated.  A wire was advanced without resistance.  With that, the large bore needle was removed and over a wire, a single lumen catheter was placed.  This was placed in continuity with a previously flushed and zero'ed pressure line.  An arterial waveform is noted and the catheter was secured to the skin with 3-0 silk suture.       Median sternotomy was performed.  Pericardial fat in midline from the level of the innominate vein to the level of the diaphragm was divided in midline.  A pericardial well was created. the patient was systemically anticoagulated with IV heparin. I elected to cannulate the heart centrally accessing the ascending aorta and the right atrial appendage.  Each cannula was placed in continuity with the appropriate pump line. Retrograde autologous prime was completed as indicated.  A combined cardioplegia/aortic root vent set was secured with a horizontal mattress 4-0 Prolene suture.  With an appropriate ACT and all in readiness, cardiopulmonary bypass was commenced.       Cooling was initiated for planned circulatory arrest.  1 gram of solumedrol was administered. BIS monitoring was in place and used to guide burst suppression treatment with Propofol prior to circulatory arrest.  The arch, ascending aorta and root were isolated as much possible.  With that the right superior pulmonary vein was dissected out and a pursestring suture was placed through which a LV vent was placed via a transverse venotomy.  The SVC was circumferentially dissected out. Caval tape was placed.  A 4-0 prolene purse string was placed.  A transverse venotomy was performed and a 24 [place] venous  cannula was placed for placed retrograde cerebral perfusion during DHCA. With that I proceeded to apply the aortic cross-clamp and administered [place] cardioplegia antegrade.  Electromechanical arrest was achieved.  Topical hypothermia was applied to the heart.  A total dose of 1000 ml of cardioplegia was administered antegrade.  The left coronary and thereafter the right coronary artery ostia were selectively accessed for a second dose of cardioplegia at a later time.       With that I excised the ascending aorta up to the remaining cuff of ascending aorta leaving the root in place at the level of the sinotubular junction.  The root deemed able to be preserved.  The remaining aorta was resected.     At this time we achieved goal temp of 22 centigrade.  The patient was placed in steep trendelenburg, the head had been packed in ice and propofol was administered until Bis monitoring was zero. With all in readiness and a 26 mm Platinum Hemashield graft prepared, pump support was discontinued.  To that end, the aortic clamp was released.  Retrograde cerebral perfusion was initiated.  The patient was not exsanguinated.  The proximal aortic arch was excised to complete a formal hemiarch with all arch vessels including the left subclavian artery fully visualized. The open distal anastomosis was performed with running 3-0 Prolene suture.  The side arm was cannulated with the existing arterial line and flow was initiated as we deaired the arch head vessels and graft. This was satisfactorily performed.  Retrograde perfusion was discontinued and full support was initiated.  The open distal anastomosis was hemostatic satisfactorily and bioglue was applied.   Rewarming was initiated.  Bioglue was applied to the open distal anastomosis.  The SVC cannula was removed and its pursestring suture was removed.       To that end, the aortic valve was inspected and is as per operative findings. This was sharply excised. The annulus was  debrided of heavy calcific burden particularly to the aorto-mitral curtain.  Dense calcified burden extended onto the mitral valve leaflet.  The annulus was measured and a 21 mm aortic valve was selected. With all vents discontinued, multiple aliquots of irrigant were used and cleared via separate wall suction to ensure no debris is present within the left ventricle cavity, left ventricular outflow tract, or aortic root. With that vents were restored. I placed about the aortic valve annulus horizontal mattress non-everting pledgeted 2-0 Ethibond sutures. The aortic valve was introduced onto the field.  Thereafter, each valve stitch was passed through the sewing ring of the aortic valve and the aortic valve was then lowered and seated.  Each valve stitch was cinched down with a corKnot.  The coronary ostia were not obstructed. The valve was satisfactory seated.    The left atrial appendage was measured and a 35 mm AtriCure AtriClip device was applied satisfactorily excluding the appendage.    With that the graft was fashioned to size and beveled as is appropriate. A tiera ascending aorta graft to aortic root anastomosis using end-to-end orientation with running 3-0 prolene suture was constructed.  Bioglue was applied.  A combined antegrade cardioplegia/aortic root vent set was secured with a horizontal mattress 4-0 Prolene suture following graftotomy with eye cautery.      With that being accomplished, a modified terminal hotshot was administered. The patient was placed in trendelenburg position.  Upon completion of modified terminal hotshot and placement of temporary epicardial ventricular pacing wires, with the aortic vent on high and pump flows diminished, the aortic cross-clamp was released.  With that, full support was implemented.  A nonworking beating phase was implemented.  During this time each anastomoses were inspected and satisfactorily hemostatic.  The proximal and distal anastomoses were inspected and  were hemostatic.  Ventilation was restored. With all in readiness, the heart was allowed to fill.  De-aring maneuvers were performed as guided by transesophageal echocardiography.  With that the heart was decompressed and we removed the LV vent first and then sequentially the aortic root vent/cardioplegia cannulas set.  Its associated pursestring sutures were tied securely and this reinforced as per matter routine.  The table was now placed in neutral position.  With that we did continue to rewarm until goal temp reached.  With all in readiness then, we did proceed to wean from and separate from cardiopulmonary bypass.  Systemic intravenous protamine was administered.  Thrombin and gelfoam were applied as indicated to surgical sites.  All associated blood volume was returned to the patient with the venous cannula removed and its atrial pursestring snared down.     With continued good hemodynamics, I tied down the arterial side arm graft x 2 and divided it.  Hemostasis was satisfactory.  The mediastinum was drained.  I surveyed the chest and hemostasis was pristine. With that I impregnated the sternal edges with vancomycin, thrombin, and Gelfoam paste.  The previously snared atrial purse string suture was tied down.  The sternum was reapproximated with stainless sterile wires placed in an interrupted fashion.  In layers anatomically the soft tissue planes were reapproximated.   The Prevena incision management system was applied to the sternotomy incision.          Complications: None      Disposition: Transferred to ICU in stable and guarded condition.    Marck Clark MD

## 2025-03-07 ENCOUNTER — APPOINTMENT (OUTPATIENT)
Dept: GENERAL RADIOLOGY | Facility: HOSPITAL | Age: 70
DRG: 221 | End: 2025-03-07
Payer: MEDICARE

## 2025-03-07 LAB
ALBUMIN SERPL-MCNC: 3.6 G/DL (ref 3.5–5.2)
ANION GAP SERPL CALCULATED.3IONS-SCNC: 11 MMOL/L (ref 5–15)
ARTERIAL PATENCY WRIST A: ABNORMAL
ATMOSPHERIC PRESS: 752 MMHG
ATMOSPHERIC PRESS: 752 MMHG
BASE EXCESS BLDA CALC-SCNC: -0.3 MMOL/L (ref 0–2)
BASE EXCESS BLDV CALC-SCNC: 1.3 MMOL/L (ref 0–2)
BASOPHILS # BLD AUTO: 0 10*3/MM3 (ref 0–0.2)
BASOPHILS NFR BLD AUTO: 0 % (ref 0–1.5)
BDY SITE: ABNORMAL
BDY SITE: NORMAL
BH BB BLOOD EXPIRATION DATE: NORMAL
BH BB BLOOD TYPE BARCODE: 5100
BH BB BLOOD TYPE BARCODE: 6200
BH BB DISPENSE STATUS: NORMAL
BH BB PRODUCT CODE: NORMAL
BH BB UNIT NUMBER: NORMAL
BODY TEMPERATURE: 37
BODY TEMPERATURE: 37
BUN SERPL-MCNC: 16 MG/DL (ref 8–23)
BUN/CREAT SERPL: 16.5 (ref 7–25)
CALCIUM SPEC-SCNC: 8.4 MG/DL (ref 8.6–10.5)
CHLORIDE SERPL-SCNC: 106 MMOL/L (ref 98–107)
CO2 SERPL-SCNC: 23 MMOL/L (ref 22–29)
CREAT SERPL-MCNC: 0.97 MG/DL (ref 0.57–1)
DEPRECATED RDW RBC AUTO: 43.6 FL (ref 37–54)
EGFRCR SERPLBLD CKD-EPI 2021: 62.6 ML/MIN/1.73
EOSINOPHIL # BLD AUTO: 0 10*3/MM3 (ref 0–0.4)
EOSINOPHIL NFR BLD AUTO: 0 % (ref 0.3–6.2)
ERYTHROCYTE [DISTWIDTH] IN BLOOD BY AUTOMATED COUNT: 13.2 % (ref 12.3–15.4)
GAS FLOW AIRWAY: 3 LPM
GLUCOSE BLDC GLUCOMTR-MCNC: 136 MG/DL (ref 70–130)
GLUCOSE BLDC GLUCOMTR-MCNC: 139 MG/DL (ref 70–130)
GLUCOSE BLDC GLUCOMTR-MCNC: 143 MG/DL (ref 70–130)
GLUCOSE BLDC GLUCOMTR-MCNC: 143 MG/DL (ref 70–130)
GLUCOSE BLDC GLUCOMTR-MCNC: 148 MG/DL (ref 70–130)
GLUCOSE BLDC GLUCOMTR-MCNC: 164 MG/DL (ref 70–130)
GLUCOSE BLDC GLUCOMTR-MCNC: 169 MG/DL (ref 70–130)
GLUCOSE BLDC GLUCOMTR-MCNC: 173 MG/DL (ref 70–130)
GLUCOSE SERPL-MCNC: 143 MG/DL (ref 65–99)
HCO3 BLDA-SCNC: 24.3 MMOL/L (ref 20–26)
HCO3 BLDV-SCNC: 27.1 MMOL/L (ref 22–28)
HCT VFR BLD AUTO: 29.4 % (ref 34–46.6)
HGB BLD-MCNC: 9.9 G/DL (ref 12–15.9)
IMM GRANULOCYTES # BLD AUTO: 0.07 10*3/MM3 (ref 0–0.05)
IMM GRANULOCYTES NFR BLD AUTO: 0.5 % (ref 0–0.5)
INHALED O2 CONCENTRATION: 30 %
INR PPP: 1.18 (ref 0.91–1.09)
LYMPHOCYTES # BLD AUTO: 0.44 10*3/MM3 (ref 0.7–3.1)
LYMPHOCYTES NFR BLD AUTO: 3 % (ref 19.6–45.3)
Lab: ABNORMAL
Lab: NORMAL
MAGNESIUM SERPL-MCNC: 1.8 MG/DL (ref 1.6–2.4)
MCH RBC QN AUTO: 30.4 PG (ref 26.6–33)
MCHC RBC AUTO-ENTMCNC: 33.7 G/DL (ref 31.5–35.7)
MCV RBC AUTO: 90.2 FL (ref 79–97)
MODALITY: ABNORMAL
MODALITY: NORMAL
MONOCYTES # BLD AUTO: 0.59 10*3/MM3 (ref 0.1–0.9)
MONOCYTES NFR BLD AUTO: 4.1 % (ref 5–12)
NEUTROPHILS NFR BLD AUTO: 13.46 10*3/MM3 (ref 1.7–7)
NEUTROPHILS NFR BLD AUTO: 92.4 % (ref 42.7–76)
NRBC BLD AUTO-RTO: 0 /100 WBC (ref 0–0.2)
PCO2 BLDA: 39 MM HG (ref 35–45)
PCO2 BLDV: 47.4 MM HG (ref 41–51)
PCO2 TEMP ADJ BLD: 39 MM HG (ref 35–45)
PEEP RESPIRATORY: 5 CM[H2O]
PH BLDA: 7.4 PH UNITS (ref 7.35–7.45)
PH BLDV: 7.37 PH UNITS (ref 7.32–7.42)
PH, TEMP CORRECTED: 7.4 PH UNITS (ref 7.35–7.45)
PHOSPHATE SERPL-MCNC: 3 MG/DL (ref 2.5–4.5)
PLATELET # BLD AUTO: 141 10*3/MM3 (ref 140–450)
PMV BLD AUTO: 10 FL (ref 6–12)
PO2 BLD: 262 MM[HG] (ref 0–500)
PO2 BLDA: 78.7 MM HG (ref 83–108)
PO2 BLDV: 29.2 MM HG (ref 27–53)
PO2 TEMP ADJ BLD: 78.7 MM HG (ref 83–108)
POTASSIUM SERPL-SCNC: 4 MMOL/L (ref 3.5–5.2)
PROTHROMBIN TIME: 15.7 SECONDS (ref 11.8–14.8)
PSV: 10 CMH2O
RBC # BLD AUTO: 3.26 10*6/MM3 (ref 3.77–5.28)
SAO2 % BLDCOA: 96.5 % (ref 94–99)
SAO2 % BLDCOV: 50.8 % (ref 45–75)
SODIUM SERPL-SCNC: 140 MMOL/L (ref 136–145)
UNIT  ABO: NORMAL
UNIT  RH: NORMAL
VENTILATOR MODE: ABNORMAL
VENTILATOR MODE: NORMAL
WBC NRBC COR # BLD AUTO: 14.56 10*3/MM3 (ref 3.4–10.8)

## 2025-03-07 PROCEDURE — 25010000002 ENOXAPARIN PER 10 MG: Performed by: THORACIC SURGERY (CARDIOTHORACIC VASCULAR SURGERY)

## 2025-03-07 PROCEDURE — 97162 PT EVAL MOD COMPLEX 30 MIN: CPT

## 2025-03-07 PROCEDURE — 93010 ELECTROCARDIOGRAM REPORT: CPT | Performed by: INTERNAL MEDICINE

## 2025-03-07 PROCEDURE — 25010000002 MAGNESIUM SULFATE 4 GM/100ML SOLUTION: Performed by: THORACIC SURGERY (CARDIOTHORACIC VASCULAR SURGERY)

## 2025-03-07 PROCEDURE — 94799 UNLISTED PULMONARY SVC/PX: CPT

## 2025-03-07 PROCEDURE — 25010000002 VANCOMYCIN PER 500 MG: Performed by: THORACIC SURGERY (CARDIOTHORACIC VASCULAR SURGERY)

## 2025-03-07 PROCEDURE — 99024 POSTOP FOLLOW-UP VISIT: CPT | Performed by: THORACIC SURGERY (CARDIOTHORACIC VASCULAR SURGERY)

## 2025-03-07 PROCEDURE — 83735 ASSAY OF MAGNESIUM: CPT | Performed by: THORACIC SURGERY (CARDIOTHORACIC VASCULAR SURGERY)

## 2025-03-07 PROCEDURE — 85025 COMPLETE CBC W/AUTO DIFF WBC: CPT | Performed by: THORACIC SURGERY (CARDIOTHORACIC VASCULAR SURGERY)

## 2025-03-07 PROCEDURE — 85610 PROTHROMBIN TIME: CPT | Performed by: THORACIC SURGERY (CARDIOTHORACIC VASCULAR SURGERY)

## 2025-03-07 PROCEDURE — 94664 DEMO&/EVAL PT USE INHALER: CPT

## 2025-03-07 PROCEDURE — 82948 REAGENT STRIP/BLOOD GLUCOSE: CPT

## 2025-03-07 PROCEDURE — 25010000002 ACETAMINOPHEN 10 MG/ML SOLUTION: Performed by: THORACIC SURGERY (CARDIOTHORACIC VASCULAR SURGERY)

## 2025-03-07 PROCEDURE — 71045 X-RAY EXAM CHEST 1 VIEW: CPT

## 2025-03-07 PROCEDURE — 63710000001 INSULIN LISPRO (HUMAN) PER 5 UNITS: Performed by: NURSE PRACTITIONER

## 2025-03-07 PROCEDURE — 97116 GAIT TRAINING THERAPY: CPT

## 2025-03-07 PROCEDURE — 82803 BLOOD GASES ANY COMBINATION: CPT

## 2025-03-07 PROCEDURE — 82805 BLOOD GASES W/O2 SATURATION: CPT

## 2025-03-07 PROCEDURE — 25010000002 CEFAZOLIN PER 500 MG: Performed by: THORACIC SURGERY (CARDIOTHORACIC VASCULAR SURGERY)

## 2025-03-07 PROCEDURE — 93005 ELECTROCARDIOGRAM TRACING: CPT | Performed by: THORACIC SURGERY (CARDIOTHORACIC VASCULAR SURGERY)

## 2025-03-07 PROCEDURE — 25010000002 FUROSEMIDE PER 20 MG: Performed by: NURSE PRACTITIONER

## 2025-03-07 PROCEDURE — 25010000002 ONDANSETRON PER 1 MG: Performed by: THORACIC SURGERY (CARDIOTHORACIC VASCULAR SURGERY)

## 2025-03-07 PROCEDURE — 80069 RENAL FUNCTION PANEL: CPT | Performed by: THORACIC SURGERY (CARDIOTHORACIC VASCULAR SURGERY)

## 2025-03-07 PROCEDURE — 94761 N-INVAS EAR/PLS OXIMETRY MLT: CPT

## 2025-03-07 RX ORDER — PREGABALIN 25 MG/1
25 CAPSULE ORAL EVERY 12 HOURS
Status: DISCONTINUED | OUTPATIENT
Start: 2025-03-07 | End: 2025-03-10 | Stop reason: HOSPADM

## 2025-03-07 RX ORDER — DEXTROSE MONOHYDRATE 25 G/50ML
25 INJECTION, SOLUTION INTRAVENOUS
Status: DISCONTINUED | OUTPATIENT
Start: 2025-03-07 | End: 2025-03-10 | Stop reason: HOSPADM

## 2025-03-07 RX ORDER — NICOTINE POLACRILEX 4 MG
15 LOZENGE BUCCAL
Status: DISCONTINUED | OUTPATIENT
Start: 2025-03-07 | End: 2025-03-10 | Stop reason: HOSPADM

## 2025-03-07 RX ORDER — INSULIN LISPRO 100 [IU]/ML
2-9 INJECTION, SOLUTION INTRAVENOUS; SUBCUTANEOUS
Status: DISCONTINUED | OUTPATIENT
Start: 2025-03-07 | End: 2025-03-10 | Stop reason: HOSPADM

## 2025-03-07 RX ORDER — MAGNESIUM SULFATE HEPTAHYDRATE 40 MG/ML
4 INJECTION, SOLUTION INTRAVENOUS ONCE
Status: COMPLETED | OUTPATIENT
Start: 2025-03-07 | End: 2025-03-07

## 2025-03-07 RX ORDER — IBUPROFEN 600 MG/1
1 TABLET ORAL
Status: DISCONTINUED | OUTPATIENT
Start: 2025-03-07 | End: 2025-03-10 | Stop reason: HOSPADM

## 2025-03-07 RX ORDER — PANTOPRAZOLE SODIUM 40 MG/1
40 TABLET, DELAYED RELEASE ORAL EVERY MORNING
Status: DISCONTINUED | OUTPATIENT
Start: 2025-03-08 | End: 2025-03-10 | Stop reason: HOSPADM

## 2025-03-07 RX ORDER — POTASSIUM CHLORIDE 750 MG/1
20 CAPSULE, EXTENDED RELEASE ORAL 2 TIMES DAILY WITH MEALS
Status: DISCONTINUED | OUTPATIENT
Start: 2025-03-07 | End: 2025-03-10 | Stop reason: HOSPADM

## 2025-03-07 RX ORDER — FUROSEMIDE 10 MG/ML
20 INJECTION INTRAMUSCULAR; INTRAVENOUS
Status: DISCONTINUED | OUTPATIENT
Start: 2025-03-07 | End: 2025-03-10 | Stop reason: HOSPADM

## 2025-03-07 RX ADMIN — ATORVASTATIN CALCIUM 20 MG: 10 TABLET, FILM COATED ORAL at 20:18

## 2025-03-07 RX ADMIN — Medication 750 MG: at 05:57

## 2025-03-07 RX ADMIN — PREGABALIN 25 MG: 25 CAPSULE ORAL at 02:23

## 2025-03-07 RX ADMIN — CEFAZOLIN 2 G: 2 INJECTION, POWDER, FOR SOLUTION INTRAMUSCULAR; INTRAVENOUS at 12:27

## 2025-03-07 RX ADMIN — BISACODYL 10 MG: 5 TABLET, COATED ORAL at 09:45

## 2025-03-07 RX ADMIN — Medication 1 APPLICATION: at 05:57

## 2025-03-07 RX ADMIN — PANTOPRAZOLE SODIUM 40 MG: 40 TABLET, DELAYED RELEASE ORAL at 07:45

## 2025-03-07 RX ADMIN — CHLORHEXIDINE GLUCONATE 0.12% ORAL RINSE 15 ML: 1.2 LIQUID ORAL at 05:57

## 2025-03-07 RX ADMIN — POLYETHYLENE GLYCOL 3350 17 G: 17 POWDER, FOR SOLUTION ORAL at 09:46

## 2025-03-07 RX ADMIN — ACETAMINOPHEN 1000 MG: 500 TABLET, FILM COATED ORAL at 14:43

## 2025-03-07 RX ADMIN — FUROSEMIDE 20 MG: 10 INJECTION, SOLUTION INTRAMUSCULAR; INTRAVENOUS at 13:06

## 2025-03-07 RX ADMIN — FUROSEMIDE 20 MG: 10 INJECTION, SOLUTION INTRAMUSCULAR; INTRAVENOUS at 16:50

## 2025-03-07 RX ADMIN — INSULIN LISPRO 2 UNITS: 100 INJECTION, SOLUTION INTRAVENOUS; SUBCUTANEOUS at 20:17

## 2025-03-07 RX ADMIN — OXYCODONE HYDROCHLORIDE 10 MG: 10 TABLET ORAL at 04:22

## 2025-03-07 RX ADMIN — IPRATROPIUM BROMIDE AND ALBUTEROL SULFATE 1.5 ML: .5; 3 SOLUTION RESPIRATORY (INHALATION) at 14:49

## 2025-03-07 RX ADMIN — POTASSIUM CHLORIDE 20 MEQ: 750 CAPSULE, EXTENDED RELEASE ORAL at 13:06

## 2025-03-07 RX ADMIN — IPRATROPIUM BROMIDE AND ALBUTEROL SULFATE 1.5 ML: .5; 3 SOLUTION RESPIRATORY (INHALATION) at 10:00

## 2025-03-07 RX ADMIN — LIDOCAINE 2 PATCH: 4 PATCH TOPICAL at 14:45

## 2025-03-07 RX ADMIN — ONDANSETRON 4 MG: 2 INJECTION INTRAMUSCULAR; INTRAVENOUS at 08:40

## 2025-03-07 RX ADMIN — AMIODARONE HYDROCHLORIDE 400 MG: 200 TABLET ORAL at 18:51

## 2025-03-07 RX ADMIN — ACETAMINOPHEN 1000 MG: 10 INJECTION, SOLUTION INTRAVENOUS at 05:57

## 2025-03-07 RX ADMIN — CHLORHEXIDINE GLUCONATE 1 APPLICATION: 500 CLOTH TOPICAL at 03:56

## 2025-03-07 RX ADMIN — ENOXAPARIN SODIUM 40 MG: 100 INJECTION SUBCUTANEOUS at 09:49

## 2025-03-07 RX ADMIN — OXYCODONE HYDROCHLORIDE 5 MG: 5 TABLET ORAL at 22:10

## 2025-03-07 RX ADMIN — CHLORHEXIDINE GLUCONATE 0.12% ORAL RINSE 15 ML: 1.2 LIQUID ORAL at 18:52

## 2025-03-07 RX ADMIN — MAGNESIUM SULFATE HEPTAHYDRATE 4 G: 40 INJECTION, SOLUTION INTRAVENOUS at 04:17

## 2025-03-07 RX ADMIN — METOPROLOL TARTRATE 12.5 MG: 25 TABLET, FILM COATED ORAL at 20:18

## 2025-03-07 RX ADMIN — Medication 1 APPLICATION: at 18:52

## 2025-03-07 RX ADMIN — CEFAZOLIN 2 G: 2 INJECTION, POWDER, FOR SOLUTION INTRAMUSCULAR; INTRAVENOUS at 03:54

## 2025-03-07 RX ADMIN — CLOPIDOGREL BISULFATE 75 MG: 75 TABLET ORAL at 18:51

## 2025-03-07 RX ADMIN — BISACODYL 10 MG: 5 TABLET, COATED ORAL at 20:18

## 2025-03-07 RX ADMIN — ACETAMINOPHEN 1000 MG: 500 TABLET, FILM COATED ORAL at 20:20

## 2025-03-07 RX ADMIN — CEFAZOLIN 2 G: 2 INJECTION, POWDER, FOR SOLUTION INTRAMUSCULAR; INTRAVENOUS at 20:20

## 2025-03-07 RX ADMIN — OXYCODONE HYDROCHLORIDE 5 MG: 5 TABLET ORAL at 15:40

## 2025-03-07 RX ADMIN — ASPIRIN 162 MG: 81 TABLET, CHEWABLE ORAL at 09:46

## 2025-03-07 RX ADMIN — METOPROLOL TARTRATE 12.5 MG: 25 TABLET, FILM COATED ORAL at 09:45

## 2025-03-07 RX ADMIN — PREGABALIN 25 MG: 25 CAPSULE ORAL at 14:43

## 2025-03-07 RX ADMIN — IPRATROPIUM BROMIDE AND ALBUTEROL SULFATE 1.5 ML: .5; 3 SOLUTION RESPIRATORY (INHALATION) at 20:36

## 2025-03-07 RX ADMIN — IPRATROPIUM BROMIDE AND ALBUTEROL SULFATE 1.5 ML: .5; 3 SOLUTION RESPIRATORY (INHALATION) at 06:25

## 2025-03-07 RX ADMIN — INSULIN LISPRO 2 UNITS: 100 INJECTION, SOLUTION INTRAVENOUS; SUBCUTANEOUS at 16:48

## 2025-03-07 RX ADMIN — AMIODARONE HYDROCHLORIDE 400 MG: 200 TABLET ORAL at 07:45

## 2025-03-07 RX ADMIN — OXYCODONE HYDROCHLORIDE 10 MG: 10 TABLET ORAL at 00:59

## 2025-03-07 RX ADMIN — Medication 750 MG: at 18:52

## 2025-03-07 NOTE — PLAN OF CARE
Goal Outcome Evaluation:  Plan of Care Reviewed With: patient        Progress: improving  Outcome Evaluation: Extubated at 0013 to 2LNC, since increased to 3LNC during sleep. MST 10-15mL/hr. Adequate urine output. C/o pain treated with prns with good effect. Electrolytes replaced per protocol orders.

## 2025-03-07 NOTE — PROGRESS NOTES
"Patient name: Mary Salazar  Patient : 1955  VISIT # [id number]  MR #[id number]    Procedure:Procedure(s):  AORTIC VALVE REPLACEMENT (CE INSPIRIS 21MM VALVE), ASCENDING AORTIC ANEURYSM REPAIR, HEMIARCH AORTIC RESECTION WITH DEEP HYPOTHERMIC CIRCULATORY ARREST, ATRIAL APPENDAGE EXCLUSION LEFT 35MM, APPLICATION PREVENA, ULTRASOUND GUIDED FEMORAL ARTERIAL LINE INSERTION, TRANSESOPHAGEAL ECHOCARDIOGRAM  AORTIC VALVE ASCENDING ANEURYSM REPAIR  Procedure Date:3/6/2025  POD:1 Day Post-Op    Subjective     Extubated overnight.  On 3 L/min nasal cannula.  Reaching 750 mL on incentive spirometer.  She is up in the chair and getting ready to ambulate with physical therapy.  Improved pain control this morning.    IV drips: Insulin, IV fluids, magnesium  Telemetry: Sinus 60s  Hemodynamics reviewed, stable       Objective     Visit Vitals  /62   Pulse 62   Temp 97.6 °F (36.4 °C)   Resp 17   Ht 165 cm (64.96\")   Wt 73.3 kg (161 lb 9.6 oz)   SpO2 91%   BMI 26.92 kg/m²   Baseline weight: 149 pounds    Intake/Output Summary (Last 24 hours) at 3/7/2025 0910  Last data filed at 3/7/2025 0738  Gross per 24 hour   Intake 5981.78 ml   Output 3745 ml   Net 2236.78 ml     Mediastinal chest tube output to 50 mL / 24 hours, serosanguineous fluid, no airleak    Lab:     CBC:  Results from last 7 days   Lab Units 25  0243 25  1801 25  1504   WBC 10*3/mm3 14.56* 8.38 10.03   HEMATOCRIT % 29.4* 29.4* 30.7*   PLATELETS 10*3/mm3 141 143 143          BMP:  Results from last 7 days   Lab Units 25  0243 25  2243 25  1801 25  1419   SODIUM mmol/L 140  --  145 145   POTASSIUM mmol/L 4.0 3.9 3.9 3.5   CHLORIDE mmol/L 106  --  108* 106   CO2 mmol/L 23.0  --  24.0 25.0   GLUCOSE mg/dL 143*  --  170* 155*   BUN mg/dL 16  --  14 13   CREATININE mg/dL 0.97  --  1.05* 1.04*          COAG:  Results from last 7 days   Lab Units 25  0243 25  1419   INR  1.18* 1.19*   APTT seconds  --  32.0 "       IMAGES:       Imaging Results (Last 24 Hours)       Procedure Component Value Units Date/Time    XR Chest 1 View [[id number]] Collected: 03/07/25 0644     Updated: 03/07/25 0649    Narrative:      EXAMINATION: XR CHEST 1 -     3/7/2025 2:05 AM     HISTORY: Post-Op Heart Surgery; I35.0-Nonrheumatic aortic (valve)  stenosis; I71.21-Aneurysm of the ascending aorta, without rupture     A frontal projection of the chest is compared with the previous study  dated 3/6/2025.     The lungs are poorly expanded.     There is removal of the endotracheal tube since the previous study. The  [place]-Serafin catheter and the mediastinal drain are in place. No change.     Atelectatic changes are seen in the lower lungs, similar to the previous  study.     There is no pleural effusion, pulmonary congestion or pneumothorax.     Heart size is in the normal range. There is evidence of prior cardiac  surgery. The aortic valve prosthesis and left atrial appendage clamp are  in place.     No acute bony abnormality.       Impression:      1. Interval removal of the endotracheal tube. The remaining tubes and  lines are in place. No change.  2. The cardiopulmonary status is unchanged.              This report was signed and finalized on 3/7/2025 6:46 AM by Dr. Scarlett Hampton MD.       XR Chest 1 View [[id number]] Collected: 03/06/25 1455     Updated: 03/06/25 1501    Narrative:      EXAMINATION: XR CHEST 1 -  3/6/2025 2:55 PM 1 view     HISTORY: Post-Op Check Line & Tube Placement; I35.0-Nonrheumatic  aortic (valve) stenosis; I71.21-Aneurysm of the ascending aorta, without  rupture     COMPARISON: 3/4/2025     TECHNIQUE: A single frontal view of the chest was obtained.     FINDINGS:  Postoperative exam demonstrating median sternotomy wires, valvuloplasty  changes, and atrial appendage clip. Endotracheal tube tip projects 4 cm  above the audra. RIGHT IJ introducer sheath and [place]-Serafin catheter  noted with the tip of the [place]-Serafin  catheter projecting over the main  pulmonary artery. Retrocardiac opacity on the LEFT, likely atelectasis.  No definite pneumothorax. Patchy opacity in the medial RIGHT lung base  is also likely related to atelectasis but is nonspecific. Mediastinal  drains are noted.       Impression:         1.  Postoperative changes as discussed above. Retrocardiac opacity on  the LEFT, likely atelectasis. Hazy opacity in the medial RIGHT lung base  may represent atelectasis but is nonspecific.     2.  Tubes and lines as discussed above. [place]-Serafin catheter tip projects  over the main pulmonary artery. If the desired location is in the RIGHT  or LEFT pulmonary artery, consider advancing slightly.           3.  Other nonacute incidental findings as discussed above.           This report was signed and finalized on 3/6/2025 2:58 PM by Dr. Yannick Quesada MD.                 Physical Exam:  General: Alert, oriented. No apparent distress.   Cardiovascular: Regular rate and rhythm without murmur, rubs, or gallops.    Pulmonary: Clear to auscultation bilaterally without wheezing, rubs, or rales.  Chest: Sternotomy incision clean, dry, and intact with prevena. Sternum stable. No clicks.  MCT in place, no air leak, serosanguinous fluid.   Abdomen: Soft, non distended, and non tender.  Extremities: Warm, moves all extremities. Peripheral edema.   Neurologic:  Grossly intact with no focal deficits.            Impression:  Ascending aortic aneurysm  Aortic valve stenosis  Hypertension      Plan:  Okay to DC insulin infusion, start SSI  Encourage pulmonary toilet and ambulation  Continue multimodality pain control strategies, continue bowel regimen  Routine postcardiac surgery regimen  Discussed with patient, family, and nursing  Keep in ICU for now, may transfer to  later today pending ambulation with physical therapy      Renee Johnson, APRN  03/07/25  09:10 CST

## 2025-03-07 NOTE — THERAPY EVALUATION
Patient Name: Mary Salazar  : 1955    MRN: [id number]                              Today's Date: 3/7/2025       Admit Date: 3/6/2025    Visit Dx:     ICD-10-CM ICD-9-CM   1. Impaired mobility [Z74.09]  Z74.09 799.89   2. Aortic stenosis, severe  I35.0 424.1   3. Aneurysm of ascending aorta without rupture  I71.21 441.2     Patient Active Problem List   Diagnosis    Bicuspid aortic valve    Hypertension    Aortic stenosis, severe    Aneurysm of ascending aorta without rupture    Aortic valve stenosis     Past Medical History:   Diagnosis Date    Aneurysm     Congenital heart disease     Coronary artery disease     GERD (gastroesophageal reflux disease)     Heart murmur     Heart valve disease     Hypertension      Past Surgical History:   Procedure Laterality Date    CARDIAC CATHETERIZATION N/A 2021    Procedure: Left Heart Cath;  Surgeon: Alexis Randhawa MD;  Location:  [facility];  Service: Cardiology;  Laterality: N/A;    CARDIAC CATHETERIZATION N/A 2025    Procedure: Left Heart Cath;  Surgeon: Alexis Randhawa MD;  Location:  [facility];  Service: Cardiology;  Laterality: N/A;    HYSTERECTOMY      LAPAROSCOPIC TUBAL LIGATION      TONSILLECTOMY        General Information       Row Name 25 0802          Physical Therapy Time and Intention    Document Type evaluation  s/p 3/6 Aortic hemiarch and ascending aortic replacement with deep hypothermic circulatory arrest, AVR, prevena w/v  -ERNESTO     Mode of Treatment physical therapy  -ERNESTO       Row Name 25 0802          General Information    Patient Profile Reviewed yes  -ERNESTO     Prior Level of Function all household mobility;community mobility;ADL's;yard work  -ERNESTO     Existing Precautions/Restrictions fall;sternal;oxygen therapy device and L/min  chest tube  -ERNESTO     Barriers to Rehab medically complex  -ERNESTO       Row Name 25 0802          Living Environment    People in Home spouse  -ERNESTO       Row Name  03/07/25 0802          Cognition    Orientation Status (Cognition) oriented x 4  -ERNESTO       Row Name 03/07/25 0802          Safety Issues/Impairments Affecting Functional Mobility    Impairments Affecting Function (Mobility) balance;endurance/activity tolerance;strength;shortness of breath;pain  -ERNESTO               User Key  (r) = Recorded By, (t) = Taken By, (c) = Cosigned By      Initials Name Provider Type    Maury aMlone PT DPT Physical Therapist                   Mobility       Row Name 03/07/25 0802          Bed Mobility    Comment, (Bed Mobility) in chair  -ERNESTO       Row Name 03/07/25 0802          Sit-Stand Transfer    Sit-Stand [place] (Transfers) minimum assist (75% patient effort);moderate assist (50% patient effort)  -ERNESTO       Row Name 03/07/25 0802          Gait/Stairs (Locomotion)    [place] Level (Gait) minimum assist (75% patient effort);moderate assist (50% patient effort);2 person assist  -ERNESTO     Distance in Feet (Gait) 40  -ERNESTO     Deviations/Abnormal Patterns (Gait) chai decreased;festinating/shuffling;gait speed decreased  -ERNESTO     Bilateral Gait Deviations forward flexed posture  -ERNESTO               User Key  (r) = Recorded By, (t) = Taken By, (c) = Cosigned By      Initials Name Provider Type    Maury Malone, PT DPT Physical Therapist                   Obj/Interventions       Row Name 03/07/25 0802          Range of Motion Comprehensive    Comment, General Range of Motion B LE AROM impaired 25%  -ERNESTO       Row Name 03/07/25 0802          Strength Comprehensive (MMT)    Comment, General Manual Muscle Testing (MMT) Assessment B LE grossly 3+/5  -ERNESTO       Row Name 03/07/25 0802          Balance    Balance Assessment sitting dynamic balance;standing dynamic balance  -ERNESTO     Dynamic Sitting Balance standby assist  -ERNESTO     Position, Sitting Balance unsupported;sitting in chair  -ERNESTO     Dynamic Standing Balance minimal assist;moderate assist;2-person assist  -ERNESTO      Position/Device Used, Standing Balance supported  -ERNESTO               User Key  (r) = Recorded By, (t) = Taken By, (c) = Cosigned By      Initials Name Provider Type    Maury Malone PT DPT Physical Therapist                   Goals/Plan       Row Name 03/07/25 0802          Bed Mobility Goal 1 (PT)    Activity/Assistive Device (Bed Mobility Goal 1, PT) sit to supine/supine to sit  -ERNESTO     [place] Level/Cues Needed (Bed Mobility Goal 1, PT) supervision required  -ERNESTO     Time Frame (Bed Mobility Goal 1, PT) long term goal (LTG);10 days  -ERNESTO     Progress/Outcomes (Bed Mobility Goal 1, PT) new goal  -ERNESTO       Row Name 03/07/25 0802          Transfer Goal 1 (PT)    Activity/Assistive Device (Transfer Goal 1, PT) sit-to-stand/stand-to-sit;bed-to-chair/chair-to-bed  -ERNESTO     [place] Level/Cues Needed (Transfer Goal 1, PT) supervision required  -ERNESTO     Time Frame (Transfer Goal 1, PT) long term goal (LTG);10 days  -ERNESTO     Progress/Outcome (Transfer Goal 1, PT) new goal  -ERNESTO       Row Name 03/07/25 0802          Gait Training Goal 1 (PT)    Activity/Assistive Device (Gait Training Goal 1, PT) gait (walking locomotion);decrease fall risk;improve balance and speed;increase endurance/gait distance  -ERNESTO     [place] Level (Gait Training Goal 1, PT) supervision required  -ERNESTO     Distance (Gait Training Goal 1, PT) 200 ft  -ERNESTO     Time Frame (Gait Training Goal 1, PT) long term goal (LTG);10 days  -ERNESTO     Progress/Outcome (Gait Training Goal 1, PT) new goal  -ERNESTO       Row Name 03/07/25 0802          Therapy Assessment/Plan (PT)    Planned Therapy Interventions (PT) bed mobility training;transfer training;gait training;balance training;home exercise program;patient/family education;postural re-education;stair training;strengthening  -ERNESTO               User Key  (r) = Recorded By, (t) = Taken By, (c) = Cosigned By      Initials Name Provider Type    Maury Malone PT DPT Physical Therapist                    Clinical Impression       [facility] Name 03/07/25 0802          Pain    Pain Location chest  -ERNESTO     Pain Side/Orientation generalized  -ERNESTO     Pain Management Interventions exercise or physical activity utilized  -ERNESTO     Additional Documentation Pain Scale: FACES Pre/Post-Treatment (Group)  -ERNESTO       [place] Name 03/07/25 0802          Pain Scale: FACES Pre/Post-Treatment    Pain: FACES Scale, Pretreatment 4-->hurts little more  -ERNESTO     Posttreatment Pain Rating 2-->hurts little bit  -ERNESTO       Row Name 03/07/25 0802          Plan of Care Review    Plan of Care Reviewed With patient  -ERNESTO     Outcome Evaluation PT roxanne carpenter. She is alert and oriented, spouse in room. They report she was independent in her mobility and ADL's prior to admit. they were educated on sternal precautions. She needs min assist to stand and min/mod assist x 2 to ambulate. Was able to ambulate 40 ft total this session. Demos very small shuffling steps with a flexed posture. Unable to tolerate further gait distances this session.  Vomited before gait training as well. Fall risk. PT will cont with mobility training, activity tolerance, balance training and strengthening. Will follow for d/c recs and needs, would currently need placement for rehab but will follow.  -ERNESTO       [facility] Name 03/07/25 0802          Therapy Assessment/Plan (PT)    Patient/Family Therapy Goals Statement (PT) decrease pain  -ERNESTO     Rehab Potential (PT) fair  -ERNESTO     Criteria for Skilled Interventions Met (PT) yes;meets criteria;skilled treatment is necessary  -ERNESTO     Therapy Frequency (PT) 2 times/day  -ERNESTO     Predicted Duration of Therapy Intervention (PT) until dc  -ERNESTO       Row Name 03/07/25 0802          Vital Signs    Pre Systolic BP Rehab 108  -ERNESTO     Pre Treatment Diastolic BP 62  -ERNESTO     Post Systolic BP Rehab 101  -ERNESTO     Post Treatment Diastolic BP 69  -ERNESTO     Pretreatment Heart Rate (beats/min) 60  -ERNESTO     Posttreatment Heart Rate (beats/min) 80  -ERNESTO     Pre SpO2 (%) 92   -ERNESTO     O2 Delivery Pre Treatment nasal cannula  -ERNESTO     Intra SpO2 (%) 85  -ERNESTO     O2 Delivery Intra Treatment nasal cannula  -ERNESTO     Post SpO2 (%) 90  -ERNESTO     O2 Delivery Post Treatment nasal cannula  -ERNESTO     Pre Patient Position Sitting  -ERNESTO     Intra Patient Position Standing  -ERNESTO     Post Patient Position Sitting  -ERNESTO       Row Name 03/07/25 0802          Positioning and Restraints    Pre-Treatment Position sitting in chair/recliner  -ERNESTO     Post Treatment Position chair  -ERNESTO     In Chair reclined;call light within reach;encouraged to call for assist;RUE elevated;LUE elevated;with family/caregiver;patient within staff view;with nsg;notified nsg  -ERNESTO               User Key  (r) = Recorded By, (t) = Taken By, (c) = Cosigned By      Initials Name Provider Type    Maury Malone, PT DPT Physical Therapist                   Outcome Measures       Row Name 03/07/25 0802 03/07/25 0745       How much help from another person do you currently need...    Turning from your back to your side while in flat bed without using bedrails? 2  -ERNESTO 2  -HT    Moving from lying on back to sitting on the side of a flat bed without bedrails? 2  -ERNESTO 2  -HT    Moving to and from a bed to a chair (including a wheelchair)? 2  -ERNESTO 3  -HT    Standing up from a chair using your arms (e.g., wheelchair, bedside chair)? 2  -ERNESTO 3  -HT    Climbing 3-5 steps with a railing? 1  -ERNESTO 2  -HT    To walk in hospital room? 2  -ERNESTO 2  -HT    AM-PAC 6 Clicks Score (PT) 11  -ERNESTO 14  -HT    Highest Level of Mobility Goal 4 --> Transfer to chair/commode  -ERNESTO 4 --> Transfer to chair/commode  -HT      Row Name 03/07/25 0500          How much help from another person do you currently need...    Turning from your back to your side while in flat bed without using bedrails? 2  -SE     Moving from lying on back to sitting on the side of a flat bed without bedrails? 2  -SE     Moving to and from a bed to a chair (including a wheelchair)? 3  -SE     Standing up  from a chair using your arms (e.g., wheelchair, bedside chair)? 3  -SE     Climbing 3-5 steps with a railing? 2  -SE     To walk in hospital room? 2  -SE     AM-PAC 6 Clicks Score (PT) 14  -SE     Highest Level of Mobility Goal 4 --> Transfer to chair/commode  -SE       Row Name 03/07/25 0802          Functional Assessment    Outcome Measure Options AM-PAC 6 Clicks Basic Mobility (PT)  -ERNESTO               User Key  (r) = Recorded By, (t) = Taken By, (c) = Cosigned By      Initials Name Provider Type    Maury Malone, PT DPT Physical Therapist    Jennifer Rm, RN Registered Nurse    Cristiano Gomez RN Registered Nurse                                 Physical Therapy Education       Title: PT OT SLP Therapies (In Progress)       Topic: Physical Therapy (In Progress)       Point: Mobility training (In Progress)       Learning Progress Summary            Patient Acceptance, E, NR by ERNESTO at 3/7/2025 0802    Comment: benefits of activity, sternal prec                      Point: Home exercise program (Not Started)       Learner Progress:  Not documented in this visit.              Point: Body mechanics (Not Started)       Learner Progress:  Not documented in this visit.              Point: Precautions (In Progress)       Learning Progress Summary            Patient Acceptance, E, NR by ERNESTO at 3/7/2025 0802    Comment: benefits of activity, sternal prec                                      User Key       Initials Effective Dates Name Provider Type Farshad OROZCO 02/03/23 -  Maury Martini, PT DPT Physical Therapist PT                  PT Recommendation and Plan  Planned Therapy Interventions (PT): bed mobility training, transfer training, gait training, balance training, home exercise program, patient/family education, postural re-education, stair training, strengthening  Outcome Evaluation: PT roxanne carpenter. She is alert and oriented, spouse in room. They report she was independent in her mobility and ADL's  prior to admit. they were educated on sternal precautions. She needs min assist to stand and min/mod assist x 2 to ambulate. Was able to ambulate 40 ft total this session. Demos very small shuffling steps with a flexed posture. Unable to tolerate further gait distances this session.  Vomited before gait training as well. Fall risk. PT will cont with mobility training, activity tolerance, balance training and strengthening. Will follow for d/c recs and needs, would currently need placement for rehab but will follow.     Time Calculation:         PT Charges       Row Name 03/07/25 1206             Time Calculation    Start Time 0802  -ERNESTO      Stop Time 0856  -ERNESTO      Time Calculation (min) 54 min  -ERNESTO      PT Received On 03/07/25  -ERNESTO      PT Goal Re-Cert Due Date 03/17/25  -ERNESTO                User Key  (r) = Recorded By, (t) = Taken By, (c) = Cosigned By      Initials Name Provider Type    Maury Malone, PT DPT Physical Therapist                  Therapy Charges for Today       Code Description Service Date Service Provider Modifiers Qty    46992891157 HC PT EVAL MOD COMPLEXITY 4 3/7/2025 Maury Martini PT DPT GP 1            PT G-Codes  Outcome Measure Options: AM-PAC 6 Clicks Basic Mobility (PT)  AM-PAC 6 Clicks Score (PT): 11  PT Discharge Summary  Anticipated Discharge Disposition (PT): home with 24/7 care, home with home health, sub acute care setting, skilled nursing facility    Maury Martini, PT DPT  3/7/2025

## 2025-03-07 NOTE — CONSULTS
Nutrition Services    Patient Name:  Mary Salazar  YOB: 1955  MRN: [id number]  Admit Date:  3/6/2025    RDN consult for cardiac education. Pt s/p AVR on 3/6. Pt extubated overnight. Pt NPO at time of RDN visit this am. Diet now advanced to Cardiac diet,thin liquids.Pt reports good appetite prior to admission. Will cont to follow for education needs as appropriate. Encouraged oral intake as tolerated. Boost Original BID. Follow per protocol.    Electronically signed by:  Tierra Parker RDN, LD  03/07/25 13:30 CST

## 2025-03-07 NOTE — CASE MANAGEMENT/SOCIAL WORK
Discharge Planning Assessment   Ad[place]anna     Patient Name: Mary Salazar  MRN: [id number]  Today's Date: 3/7/2025    Admit Date: 3/6/2025        Discharge Needs Assessment       Row Name 03/07/25 1201       Living Environment    People in Home spouse    Name(s) of People in Home Víctor Salazar    Current Living Arrangements home    In the past 12 months has the electric, gas, oil, or water company threatened to shut off services in your home? No    Primary Care Provided by self    Family Caregiver if Needed spouse    Quality of Family Relationships supportive;involved    Able to Return to Prior Arrangements yes       Resource/Environmental Concerns    Resource/Environmental Concerns none    Transportation Concerns none       Transportation Needs    In the past 12 months, has lack of transportation kept you from medical appointments or from getting medications? no    In the past 12 months, has lack of transportation kept you from meetings, work, or from getting things needed for daily living? No       Food Insecurity    Within the past 12 months, you worried that your food would run out before you got the money to buy more. Never true    Within the past 12 months, the food you bought just didn't last and you didn't have money to get more. Never true       Transition Planning    Patient/Family Anticipates Transition to home with family    Patient/Family Anticipated Services at Transition none    Transportation Anticipated family or friend will provide       Discharge Needs Assessment    Readmission Within the Last 30 Days no previous admission in last 30 days    Equipment Currently Used at Home none    Concerns to be Addressed discharge planning    Do you want help finding or keeping work or a job? I do not need or want help    Do you want help with school or training? For example, starting or completing job training or getting a high school diploma, GED or equivalent No    Anticipated Changes Related to Illness none     Equipment Needed After Discharge none    Current Discharge Risk chronically ill    Discharge Coordination/Progress Patient resides at home with her spouse and is independent.  Patient is independent with ADL's.  Patient now in ICU post op.  SW following for needs.                   Discharge Plan    No documentation.                 Continued Care and Services - Admitted Since 3/6/2025    No active coordination exists for this encounter.          Demographic Summary    No documentation.                  Functional Status    No documentation.                  Psychosocial    No documentation.                  Abuse/Neglect    No documentation.                  Legal    No documentation.                  Substance Abuse    No documentation.                  Patient Forms    No documentation.                     ROSA ReillyW

## 2025-03-07 NOTE — PLAN OF CARE
Goal Outcome Evaluation: Patient extubated and placed on nasal cannula @ 2 lpm.    Problem: Mechanical Ventilation Invasive  Goal: Optimal Device Function  Outcome: Met  Intervention: Optimize Device Care and Function  Recent Flowsheet Documentation  Taken 3/6/2025 2252 by Mary Espinoza RRT  Airway Safety Measures:   mask valve resuscitator at bedside   manual resuscitator/mask at bedside   oxygen flowmeter at bedside   suction at bedside  Taken 3/6/2025 1912 by Mary Espinoza RRT  Airway Safety Measures:   mask valve resuscitator at bedside   manual resuscitator/mask at bedside   oxygen flowmeter at bedside   suction at bedside  Goal: Mechanical Ventilation Liberation  Outcome: Met  Goal: Absence of Device-Related Skin and Tissue Injury  Outcome: Met  Intervention: Maintain Skin and Tissue Health  Recent Flowsheet Documentation  Taken 3/6/2025 2252 by Mary Espinoza RRT  Device Skin Pressure Protection:   absorbent pad utilized/changed   skin-to-device areas padded   tubing/devices free from skin contact  Taken 3/6/2025 1912 by Mary Espinoza RRT  Device Skin Pressure Protection:   absorbent pad utilized/changed   skin-to-device areas padded   tubing/devices free from skin contact  Goal: Absence of Ventilator-Induced Lung Injury  Outcome: Met  Intervention: Prevent Ventilator-Associated Pneumonia  Recent Flowsheet Documentation  Taken 3/6/2025 2252 by Mary Espinoza RRT  Head of Bed (HOB) Positioning: HOB elevated  Taken 3/6/2025 1912 by Mary Espinoza RRT  Head of Bed (HOB) Positioning: HOB at 30-45 degrees     Problem: Skin Injury Risk Increased  Goal: Skin Health and Integrity  Intervention: Optimize Skin Protection  Recent Flowsheet Documentation  Taken 3/6/2025 2252 by Mary Espinoza RRT  Head of Bed (HOB) Positioning: HOB elevated  Taken 3/6/2025 1912 by Mary Espinoza RRT  Head of Bed (HOB) Positioning: HOB at 30-45 degrees

## 2025-03-07 NOTE — PLAN OF CARE
Goal Outcome Evaluation:  Plan of Care Reviewed With: patient           Outcome Evaluation: PT roxanne carpenter. She is alert and oriented, spouse in room. They report she was independent in her mobility and ADL's prior to admit. they were educated on sternal precautions. She needs min assist to stand and min/mod assist x 2 to ambulate. Was able to ambulate 40 ft total this session. Demos very small shuffling steps with a flexed posture. Unable to tolerate further gait distances this session.  Vomited before gait training as well. Fall risk. PT will cont with mobility training, activity tolerance, balance training and strengthening. Will follow for d/c recs and needs, would currently need placement for rehab but will follow.    Anticipated Discharge Disposition (PT): home with 24/7 care, home with home health, sub acute care setting, skilled nursing facility

## 2025-03-07 NOTE — PROGRESS NOTES
RT EQUIPMENT DEVICE RELATED - SKIN ASSESSMENT    Shaheen Score:  Shaheen Score: 14     RT Medical Equipment/Device:     ETT [place]/[employer]fast    Skin Assessment:      Cheek:  Intact  Neck:  Intact  Lips:  Intact  Mouth:  Intact    Device Skin Pressure Protection:  Skin-to-device areas padded:  [employer]fast    Nurse Notification:  Monica Espinoza, RRT

## 2025-03-07 NOTE — PLAN OF CARE
Goal Outcome Evaluation:         Patient stood and ambulated 140' with Min x 2. 3 standing rest. Nausea when first stood.Patient will benefit from increased walking.

## 2025-03-08 LAB
ABO GROUP BLD: NORMAL
ALBUMIN SERPL-MCNC: 3.3 G/DL (ref 3.5–5.2)
ANION GAP SERPL CALCULATED.3IONS-SCNC: 10 MMOL/L (ref 5–15)
BASOPHILS # BLD AUTO: 0.02 10*3/MM3 (ref 0–0.2)
BASOPHILS NFR BLD AUTO: 0.1 % (ref 0–1.5)
BH BB BLOOD EXPIRATION DATE: NORMAL
BH BB BLOOD EXPIRATION DATE: NORMAL
BH BB BLOOD TYPE BARCODE: 5100
BH BB BLOOD TYPE BARCODE: 5100
BH BB DISPENSE STATUS: NORMAL
BH BB DISPENSE STATUS: NORMAL
BH BB PRODUCT CODE: NORMAL
BH BB PRODUCT CODE: NORMAL
BH BB UNIT NUMBER: NORMAL
BH BB UNIT NUMBER: NORMAL
BLD GP AB SCN SERPL QL: NEGATIVE
BUN SERPL-MCNC: 22 MG/DL (ref 8–23)
BUN/CREAT SERPL: 23.9 (ref 7–25)
CALCIUM SPEC-SCNC: 8.5 MG/DL (ref 8.6–10.5)
CHLORIDE SERPL-SCNC: 103 MMOL/L (ref 98–107)
CO2 SERPL-SCNC: 24 MMOL/L (ref 22–29)
CREAT SERPL-MCNC: 0.92 MG/DL (ref 0.57–1)
CROSSMATCH INTERPRETATION: NORMAL
CROSSMATCH INTERPRETATION: NORMAL
DEPRECATED RDW RBC AUTO: 46.8 FL (ref 37–54)
EGFRCR SERPLBLD CKD-EPI 2021: 66.7 ML/MIN/1.73
EOSINOPHIL # BLD AUTO: 0 10*3/MM3 (ref 0–0.4)
EOSINOPHIL NFR BLD AUTO: 0 % (ref 0.3–6.2)
ERYTHROCYTE [DISTWIDTH] IN BLOOD BY AUTOMATED COUNT: 14.1 % (ref 12.3–15.4)
GLUCOSE BLDC GLUCOMTR-MCNC: 140 MG/DL (ref 70–130)
GLUCOSE BLDC GLUCOMTR-MCNC: 141 MG/DL (ref 70–130)
GLUCOSE BLDC GLUCOMTR-MCNC: 152 MG/DL (ref 70–130)
GLUCOSE BLDC GLUCOMTR-MCNC: 164 MG/DL (ref 70–130)
GLUCOSE SERPL-MCNC: 145 MG/DL (ref 65–99)
HCT VFR BLD AUTO: 27.5 % (ref 34–46.6)
HGB BLD-MCNC: 9.2 G/DL (ref 12–15.9)
IMM GRANULOCYTES # BLD AUTO: 0.28 10*3/MM3 (ref 0–0.05)
IMM GRANULOCYTES NFR BLD AUTO: 1.5 % (ref 0–0.5)
LYMPHOCYTES # BLD AUTO: 0.92 10*3/MM3 (ref 0.7–3.1)
LYMPHOCYTES NFR BLD AUTO: 5 % (ref 19.6–45.3)
MAGNESIUM SERPL-MCNC: 2.2 MG/DL (ref 1.6–2.4)
MCH RBC QN AUTO: 30.3 PG (ref 26.6–33)
MCHC RBC AUTO-ENTMCNC: 33.5 G/DL (ref 31.5–35.7)
MCV RBC AUTO: 90.5 FL (ref 79–97)
MONOCYTES # BLD AUTO: 1.54 10*3/MM3 (ref 0.1–0.9)
MONOCYTES NFR BLD AUTO: 8.3 % (ref 5–12)
NEUTROPHILS NFR BLD AUTO: 15.77 10*3/MM3 (ref 1.7–7)
NEUTROPHILS NFR BLD AUTO: 85.1 % (ref 42.7–76)
NRBC BLD AUTO-RTO: 0 /100 WBC (ref 0–0.2)
PHOSPHATE SERPL-MCNC: 3.2 MG/DL (ref 2.5–4.5)
PLATELET # BLD AUTO: 131 10*3/MM3 (ref 140–450)
PMV BLD AUTO: 10.2 FL (ref 6–12)
POTASSIUM SERPL-SCNC: 4.7 MMOL/L (ref 3.5–5.2)
QT INTERVAL: 406 MS
QT INTERVAL: 444 MS
QT INTERVAL: 458 MS
QTC INTERVAL: 401 MS
QTC INTERVAL: 453 MS
QTC INTERVAL: 499 MS
RBC # BLD AUTO: 3.04 10*6/MM3 (ref 3.77–5.28)
RH BLD: POSITIVE
SODIUM SERPL-SCNC: 137 MMOL/L (ref 136–145)
T&S EXPIRATION DATE: NORMAL
UNIT  ABO: NORMAL
UNIT  ABO: NORMAL
UNIT  RH: NORMAL
UNIT  RH: NORMAL
WBC NRBC COR # BLD AUTO: 18.53 10*3/MM3 (ref 3.4–10.8)

## 2025-03-08 PROCEDURE — 83735 ASSAY OF MAGNESIUM: CPT | Performed by: THORACIC SURGERY (CARDIOTHORACIC VASCULAR SURGERY)

## 2025-03-08 PROCEDURE — 86901 BLOOD TYPING SEROLOGIC RH(D): CPT | Performed by: NURSE PRACTITIONER

## 2025-03-08 PROCEDURE — 94761 N-INVAS EAR/PLS OXIMETRY MLT: CPT

## 2025-03-08 PROCEDURE — 97116 GAIT TRAINING THERAPY: CPT

## 2025-03-08 PROCEDURE — 25010000002 ENOXAPARIN PER 10 MG: Performed by: THORACIC SURGERY (CARDIOTHORACIC VASCULAR SURGERY)

## 2025-03-08 PROCEDURE — 99024 POSTOP FOLLOW-UP VISIT: CPT | Performed by: SURGERY

## 2025-03-08 PROCEDURE — 25010000002 FUROSEMIDE PER 20 MG: Performed by: NURSE PRACTITIONER

## 2025-03-08 PROCEDURE — 94664 DEMO&/EVAL PT USE INHALER: CPT

## 2025-03-08 PROCEDURE — 86850 RBC ANTIBODY SCREEN: CPT | Performed by: NURSE PRACTITIONER

## 2025-03-08 PROCEDURE — 94799 UNLISTED PULMONARY SVC/PX: CPT

## 2025-03-08 PROCEDURE — 25010000002 CEFAZOLIN PER 500 MG: Performed by: THORACIC SURGERY (CARDIOTHORACIC VASCULAR SURGERY)

## 2025-03-08 PROCEDURE — 82948 REAGENT STRIP/BLOOD GLUCOSE: CPT

## 2025-03-08 PROCEDURE — 80069 RENAL FUNCTION PANEL: CPT | Performed by: THORACIC SURGERY (CARDIOTHORACIC VASCULAR SURGERY)

## 2025-03-08 PROCEDURE — 85025 COMPLETE CBC W/AUTO DIFF WBC: CPT | Performed by: THORACIC SURGERY (CARDIOTHORACIC VASCULAR SURGERY)

## 2025-03-08 PROCEDURE — 63710000001 INSULIN LISPRO (HUMAN) PER 5 UNITS: Performed by: NURSE PRACTITIONER

## 2025-03-08 PROCEDURE — 93010 ELECTROCARDIOGRAM REPORT: CPT | Performed by: INTERNAL MEDICINE

## 2025-03-08 PROCEDURE — 86900 BLOOD TYPING SEROLOGIC ABO: CPT | Performed by: NURSE PRACTITIONER

## 2025-03-08 PROCEDURE — 93005 ELECTROCARDIOGRAM TRACING: CPT | Performed by: THORACIC SURGERY (CARDIOTHORACIC VASCULAR SURGERY)

## 2025-03-08 RX ORDER — METOPROLOL TARTRATE 25 MG/1
12.5 TABLET, FILM COATED ORAL ONCE
Status: COMPLETED | OUTPATIENT
Start: 2025-03-08 | End: 2025-03-08

## 2025-03-08 RX ORDER — METOPROLOL TARTRATE 25 MG/1
25 TABLET, FILM COATED ORAL EVERY 12 HOURS SCHEDULED
Status: DISCONTINUED | OUTPATIENT
Start: 2025-03-08 | End: 2025-03-10 | Stop reason: HOSPADM

## 2025-03-08 RX ADMIN — ACETAMINOPHEN 1000 MG: 500 TABLET, FILM COATED ORAL at 13:51

## 2025-03-08 RX ADMIN — IPRATROPIUM BROMIDE AND ALBUTEROL SULFATE 1.5 ML: .5; 3 SOLUTION RESPIRATORY (INHALATION) at 13:46

## 2025-03-08 RX ADMIN — CEFAZOLIN 2 G: 2 INJECTION, POWDER, FOR SOLUTION INTRAMUSCULAR; INTRAVENOUS at 04:17

## 2025-03-08 RX ADMIN — CLOPIDOGREL BISULFATE 75 MG: 75 TABLET ORAL at 18:16

## 2025-03-08 RX ADMIN — ACETAMINOPHEN 1000 MG: 500 TABLET, FILM COATED ORAL at 07:59

## 2025-03-08 RX ADMIN — IPRATROPIUM BROMIDE AND ALBUTEROL SULFATE 1.5 ML: .5; 3 SOLUTION RESPIRATORY (INHALATION) at 19:30

## 2025-03-08 RX ADMIN — CHLORHEXIDINE GLUCONATE 1 APPLICATION: 500 CLOTH TOPICAL at 04:17

## 2025-03-08 RX ADMIN — POLYETHYLENE GLYCOL 3350 17 G: 17 POWDER, FOR SOLUTION ORAL at 08:08

## 2025-03-08 RX ADMIN — ENOXAPARIN SODIUM 40 MG: 100 INJECTION SUBCUTANEOUS at 08:08

## 2025-03-08 RX ADMIN — Medication 1 APPLICATION: at 18:16

## 2025-03-08 RX ADMIN — FUROSEMIDE 20 MG: 10 INJECTION, SOLUTION INTRAMUSCULAR; INTRAVENOUS at 07:26

## 2025-03-08 RX ADMIN — INSULIN LISPRO 2 UNITS: 100 INJECTION, SOLUTION INTRAVENOUS; SUBCUTANEOUS at 11:04

## 2025-03-08 RX ADMIN — ACETAMINOPHEN 1000 MG: 500 TABLET, FILM COATED ORAL at 02:36

## 2025-03-08 RX ADMIN — PANTOPRAZOLE SODIUM 40 MG: 40 TABLET, DELAYED RELEASE ORAL at 06:27

## 2025-03-08 RX ADMIN — CHLORHEXIDINE GLUCONATE 0.12% ORAL RINSE 15 ML: 1.2 LIQUID ORAL at 05:42

## 2025-03-08 RX ADMIN — POTASSIUM CHLORIDE 20 MEQ: 750 CAPSULE, EXTENDED RELEASE ORAL at 18:16

## 2025-03-08 RX ADMIN — INSULIN LISPRO 2 UNITS: 100 INJECTION, SOLUTION INTRAVENOUS; SUBCUTANEOUS at 07:26

## 2025-03-08 RX ADMIN — LIDOCAINE 2 PATCH: 4 PATCH TOPICAL at 14:52

## 2025-03-08 RX ADMIN — PREGABALIN 25 MG: 25 CAPSULE ORAL at 02:36

## 2025-03-08 RX ADMIN — METOPROLOL TARTRATE 12.5 MG: 25 TABLET, FILM COATED ORAL at 09:18

## 2025-03-08 RX ADMIN — CHLORHEXIDINE GLUCONATE 0.12% ORAL RINSE 15 ML: 1.2 LIQUID ORAL at 21:20

## 2025-03-08 RX ADMIN — Medication 1 APPLICATION: at 05:42

## 2025-03-08 RX ADMIN — METOPROLOL TARTRATE 25 MG: 25 TABLET, FILM COATED ORAL at 21:21

## 2025-03-08 RX ADMIN — OXYCODONE HYDROCHLORIDE 5 MG: 5 TABLET ORAL at 08:57

## 2025-03-08 RX ADMIN — ASPIRIN 81 MG: 81 TABLET, COATED ORAL at 08:08

## 2025-03-08 RX ADMIN — BISACODYL 10 MG: 5 TABLET, COATED ORAL at 21:21

## 2025-03-08 RX ADMIN — FUROSEMIDE 20 MG: 10 INJECTION, SOLUTION INTRAMUSCULAR; INTRAVENOUS at 18:16

## 2025-03-08 RX ADMIN — PREGABALIN 25 MG: 25 CAPSULE ORAL at 14:52

## 2025-03-08 RX ADMIN — BISACODYL 10 MG: 5 TABLET, COATED ORAL at 08:08

## 2025-03-08 RX ADMIN — AMIODARONE HYDROCHLORIDE 400 MG: 200 TABLET ORAL at 18:16

## 2025-03-08 RX ADMIN — METOPROLOL TARTRATE 12.5 MG: 25 TABLET, FILM COATED ORAL at 08:08

## 2025-03-08 RX ADMIN — AMIODARONE HYDROCHLORIDE 400 MG: 200 TABLET ORAL at 07:59

## 2025-03-08 RX ADMIN — IPRATROPIUM BROMIDE AND ALBUTEROL SULFATE 1.5 ML: .5; 3 SOLUTION RESPIRATORY (INHALATION) at 10:15

## 2025-03-08 RX ADMIN — POTASSIUM CHLORIDE 20 MEQ: 750 CAPSULE, EXTENDED RELEASE ORAL at 07:59

## 2025-03-08 RX ADMIN — ATORVASTATIN CALCIUM 20 MG: 10 TABLET, FILM COATED ORAL at 21:21

## 2025-03-08 RX ADMIN — IPRATROPIUM BROMIDE AND ALBUTEROL SULFATE 1.5 ML: .5; 3 SOLUTION RESPIRATORY (INHALATION) at 06:02

## 2025-03-08 NOTE — PROGRESS NOTES
[facility] Cardiothoracic Surgery  PROGRESS NOTE           Procedure Date:3/6/2025  AORTIC VALVE REPLACEMENT (CE INSPIRIS 21MM VALVE), ASCENDING AORTIC ANEURYSM REPAIR, HEMIARCH AORTIC RESECTION WITH DEEP HYPOTHERMIC CIRCULATORY ARREST, ATRIAL APPENDAGE EXCLUSION LEFT 35MM, APPLICATION PREVENA, ULTRASOUND GUIDED FEMORAL ARTERIAL LINE INSERTION, TRANSESOPHAGEAL ECHOCARDIOGRAM  AORTIC VALVE ASCENDING ANEURYSM REPAIR      Subjective:  Sitting up in chair, pain well-controlled. Ambulated in hallway with minimal assist.     Objective:      Intake/Output Summary (Last 24 hours) at 3/8/2025 1004  Last data filed at 3/8/2025 0753  Gross per 24 hour   Intake 815 ml   Output 2095 ml   Net -1280 ml       CT Output:   MCT to - 20 cm suction with 305 ml serosanguinous drainage in the past 24 hours. No air leak.     Gtt's:  None    Wt preop:  150 lb  Wt current:       03/08/25  0550   Weight: 74.2 kg (163 lb 9.6 oz)         PE:  Vitals:    03/08/25 0845   BP:    Pulse: 85   Resp:    Temp:    SpO2: 93%     GENERAL: NAD, resting comfortably, normal color  CARDIOVASCULAR: Normal HT with RRR. No murmurs, gallops or rubs.   PULMONARY: Clear bilateral breath sounds without wheezes or rhonchi.  CHEST: Sternotomy incision with Prevena intact with good seal. Sternum stable. No clicks.   ABDOMEN: Soft, non-tender/non-distended with active bowel sounds.   EXTREMITIES: No clubbing or cyanosis. Mild peripheral edema. Pedal pulses palpated.         Lab Results (last 72 hours)       Procedure Component Value Units Date/Time    POC Glucose Once [[id number]]  (Abnormal) Collected: 03/08/25 0711    Specimen: Blood Updated: 03/08/25 0722     Glucose 164 mg/dL      Comment: : [id number] Villafuerte AllysonMeter ID: [id number]       Renal Function Panel [[id number]]  (Abnormal) Collected: 03/08/25 0431    Specimen: Blood Updated: 03/08/25 0500     Glucose 145 mg/dL      BUN 22 mg/dL      Creatinine 0.92 mg/dL      Sodium 137 mmol/L       Potassium 4.7 mmol/L      Chloride 103 mmol/L      CO2 24.0 mmol/L      Calcium 8.5 mg/dL      Albumin 3.3 g/dL      Phosphorus 3.2 mg/dL      Anion Gap 10.0 mmol/L      BUN/Creatinine Ratio 23.9     eGFR 66.7 mL/min/1.73     Narrative:      GFR Categories in Chronic Kidney Disease (CKD)      GFR Category          GFR (mL/min/1.73)    Interpretation  G1                     90 or greater         Normal or high (1)  G2                      60-89                Mild decrease (1)  G3a                   45-59                Mild to moderate decrease  G3b                   30-44                Moderate to severe decrease  G4                    15-29                Severe decrease  G5                    14 or less           Kidney failure          (1)In the absence of evidence of kidney disease, neither GFR category G1 or G2 fulfill the criteria for CKD.    eGFR calculation 2021 CKD-EPI creatinine equation, which does not include race as a factor    Magnesium [[id number]]  (Normal) Collected: 03/08/25 0431    Specimen: Blood Updated: 03/08/25 0500     Magnesium 2.2 mg/dL     CBC & Differential [[id number]]  (Abnormal) Collected: 03/08/25 0431    Specimen: Blood Updated: 03/08/25 0438    Narrative:      The following orders were created for panel order CBC & Differential.  Procedure                               Abnormality         Status                     ---------                               -----------         ------                     CBC Auto Differential[[id number]]        Abnormal            Final result                 Please view results for these tests on the individual orders.    CBC Auto Differential [[id number]]  (Abnormal) Collected: 03/08/25 0431    Specimen: Blood Updated: 03/08/25 0438     WBC 18.53 10*3/mm3      RBC 3.04 10*6/mm3      Hemoglobin 9.2 g/dL      Hematocrit 27.5 %      MCV 90.5 fL      MCH 30.3 pg      MCHC 33.5 g/dL      RDW 14.1 %      RDW-SD 46.8 fl      MPV 10.2 fL      Platelets 131  10*3/mm3      Neutrophil % 85.1 %      Lymphocyte % 5.0 %      Monocyte % 8.3 %      Eosinophil % 0.0 %      Basophil % 0.1 %      Immature Grans % 1.5 %      Neutrophils, Absolute 15.77 10*3/mm3      Lymphocytes, Absolute 0.92 10*3/mm3      Monocytes, Absolute 1.54 10*3/mm3      Eosinophils, Absolute 0.00 10*3/mm3      Basophils, Absolute 0.02 10*3/mm3      Immature Grans, Absolute 0.28 10*3/mm3      nRBC 0.0 /100 WBC     POC Glucose Once [[id number]]  (Abnormal) Collected: 03/07/25 1922    Specimen: Blood Updated: 03/07/25 1933     Glucose 164 mg/dL      Comment: : [id number] Markus SamiMeter ID: [id number]       POC Glucose Once [[id number]]  (Abnormal) Collected: 03/07/25 1645    Specimen: Blood Updated: 03/07/25 1704     Glucose 169 mg/dL      Comment: : [id number] Jensen HeatherMeter ID: [id number]       POC Glucose Once [[id number]]  (Abnormal) Collected: 03/07/25 1113    Specimen: Blood Updated: 03/07/25 1133     Glucose 143 mg/dL      Comment: : [id number] Jensen HeatherMeter ID: [id number]       POC Glucose Once [[id number]]  (Abnormal) Collected: 03/07/25 0856    Specimen: Blood Updated: 03/07/25 0907     Glucose 173 mg/dL      Comment: : [id number] Jensen HeatherMeter ID: [id number]       Tissue Pathology Exam [[id number]] Collected: 03/06/25 1025    Specimen: Tissue from Aortic Leaflet, Tissue from Aortic Aneurysm Updated: 03/07/25 0848    POC Glucose Once [[id number]]  (Abnormal) Collected: 03/07/25 0735    Specimen: Blood Updated: 03/07/25 0753     Glucose 143 mg/dL      Comment: : [id number] Jensen HeatherMeter ID: [id number]       POC Glucose Once [[id number]]  (Abnormal) Collected: 03/07/25 0603    Specimen: Blood Updated: 03/07/25 0614     Glucose 136 mg/dL      Comment: : [id number] Khoi SethMeter ID: [id number]       Blood Gas, Venous - [[id number]] Collected: 03/07/25 0348    Specimen: Venous Blood Updated: 03/07/25 0351     Site OTHER     pH, Venous 7.366 pH Units      pCO2,  Venous 47.4 mm Hg      pO2, Venous 29.2 mm Hg      HCO3, Venous 27.1 mmol/L      Base Excess, Venous 1.3 mmol/L      O2 Saturation, Venous 50.8 %      Temperature 37.0     Barometric Pressure for Blood Gas 752 mmHg      Modality Nasal Cannula     Flow Rate 3.0 lpm      Ventilator Mode NA     Collected by [id number]     Comment: Meter: [id number]     :  [id number]       Magnesium [[id number]]  (Normal) Collected: 03/07/25 0243    Specimen: Blood Updated: 03/07/25 0306     Magnesium 1.8 mg/dL     Renal Function Panel [[id number]]  (Abnormal) Collected: 03/07/25 0243    Specimen: Blood Updated: 03/07/25 0306     Glucose 143 mg/dL      BUN 16 mg/dL      Creatinine 0.97 mg/dL      Sodium 140 mmol/L      Potassium 4.0 mmol/L      Chloride 106 mmol/L      CO2 23.0 mmol/L      Calcium 8.4 mg/dL      Albumin 3.6 g/dL      Phosphorus 3.0 mg/dL      Anion Gap 11.0 mmol/L      BUN/Creatinine Ratio 16.5     eGFR 62.6 mL/min/1.73     Narrative:      GFR Categories in Chronic Kidney Disease (CKD)      GFR Category          GFR (mL/min/1.73)    Interpretation  G1                     90 or greater         Normal or high (1)  G2                      60-89                Mild decrease (1)  G3a                   45-59                Mild to moderate decrease  G3b                   30-44                Moderate to severe decrease  G4                    15-29                Severe decrease  G5                    14 or less           Kidney failure          (1)In the absence of evidence of kidney disease, neither GFR category G1 or G2 fulfill the criteria for CKD.    eGFR calculation 2021 CKD-EPI creatinine equation, which does not include race as a factor    Protime-INR [[id number]]  (Abnormal) Collected: 03/07/25 0243    Specimen: Blood Updated: 03/07/25 0258     Protime 15.7 Seconds      INR 1.18    CBC & Differential [[id number]]  (Abnormal) Collected: 03/07/25 0243    Specimen: Blood Updated: 03/07/25 0255    Narrative:      The  following orders were created for panel order CBC & Differential.  Procedure                               Abnormality         Status                     ---------                               -----------         ------                     CBC Auto Differential[[id number]]        Abnormal            Final result                 Please view results for these tests on the individual orders.    CBC Auto Differential [[id number]]  (Abnormal) Collected: 03/07/25 0243    Specimen: Blood Updated: 03/07/25 0255     WBC 14.56 10*3/mm3      RBC 3.26 10*6/mm3      Hemoglobin 9.9 g/dL      Hematocrit 29.4 %      MCV 90.2 fL      MCH 30.4 pg      MCHC 33.7 g/dL      RDW 13.2 %      RDW-SD 43.6 fl      MPV 10.0 fL      Platelets 141 10*3/mm3      Neutrophil % 92.4 %      Lymphocyte % 3.0 %      Monocyte % 4.1 %      Eosinophil % 0.0 %      Basophil % 0.0 %      Immature Grans % 0.5 %      Neutrophils, Absolute 13.46 10*3/mm3      Lymphocytes, Absolute 0.44 10*3/mm3      Monocytes, Absolute 0.59 10*3/mm3      Eosinophils, Absolute 0.00 10*3/mm3      Basophils, Absolute 0.00 10*3/mm3      Immature Grans, Absolute 0.07 10*3/mm3      nRBC 0.0 /100 WBC     POC Glucose Once [[id number]]  (Abnormal) Collected: 03/07/25 0241    Specimen: Blood Updated: 03/07/25 0251     Glucose 139 mg/dL      Comment: : [id number] Elmer SethMeter ID: [id number]       POC Glucose Once [[id number]]  (Abnormal) Collected: 03/07/25 0104    Specimen: Blood Updated: 03/07/25 0115     Glucose 148 mg/dL      Comment: : [id number] Elmer SethMeter ID: [id number]       Blood Gas, Arterial - [[id number]]  (Abnormal) Collected: 03/07/25 0005    Specimen: Arterial Blood Updated: 03/07/25 0010     Site Arterial Line     Alfonso's Test N/A     pH, Arterial 7.403 pH units      pCO2, Arterial 39.0 mm Hg      pO2, Arterial 78.7 mm Hg      Comment: 84 Value below reference range        HCO3, Arterial 24.3 mmol/L      Base Excess, Arterial -0.3 mmol/L      Comment: 84  Value below reference range        O2 Saturation, Arterial 96.5 %      Temperature 37.0     Barometric Pressure for Blood Gas 752 mmHg      Modality Ventilator     FIO2 30 %      Ventilator Mode PSV     PEEP 5.0     PSV 10.0 cmH2O      Collected by [id number]     Comment: Meter: [id number]     :  Mary Espinoza RRT        pCO2, Temperature Corrected 39.0 mm Hg      pH, Temp Corrected 7.403 pH Units      pO2, Temperature Corrected 78.7 mm Hg      PO2/FIO2 262    Potassium [[id number]]  (Normal) Collected: 03/06/25 2243    Specimen: Blood Updated: 03/06/25 2300     Potassium 3.9 mmol/L     POC Glucose Once [[id number]]  (Abnormal) Collected: 03/06/25 2241    Specimen: Blood Updated: 03/06/25 2253     Glucose 152 mg/dL      Comment: : [id number] Khoi SethMeter ID: [id number]       Calcium, Ionized [[id number]]  (Abnormal) Collected: 03/06/25 2122    Specimen: Blood Updated: 03/06/25 2124     Ionized Calcium 4.57 mg/dL      Comment: 84 Value below reference range        Collected by [id number]     Comment: Meter: [id number]     :  Mary Espinoza RRT       Blood Gas, Venous - [[id number]] Collected: 03/06/25 2119    Specimen: Venous Blood Updated: 03/06/25 2123     Site OTHER     pH, Venous 7.350 pH Units      pCO2, Venous 48.8 mm Hg      pO2, Venous 33.3 mm Hg      HCO3, Venous 26.9 mmol/L      Base Excess, Venous 0.9 mmol/L      O2 Saturation, Venous 59.0 %      Temperature 37.0     Barometric Pressure for Blood Gas 753 mmHg      Modality Ventilator     FIO2 30 %      Flow Rate 0.0 lpm      Ventilator Mode SIMV     Set Tidal Volume 450.000     Set Mech Resp Rate 18.0     PEEP 5.0     PSV 10.0 cmH2O      Collected by [id number]     Comment: Meter: [id number]     :  Mary Espinoza RRT       POC Glucose Once [[id number]]  (Abnormal) Collected: 03/06/25 2046    Specimen: Blood Updated: 03/06/25 2058     Glucose 148 mg/dL      Comment: : [id number] [place] SethMeter ID: [id number]        POC Glucose Once [[id number]]  (Abnormal) Collected: 03/06/25 1947    Specimen: Blood Updated: 03/06/25 1959     Glucose 158 mg/dL      Comment: : [id number] [place] SethMeter ID: [id number]       Blood Gas, Arterial - [[id number]]  (Abnormal) Collected: 03/06/25 1917    Specimen: Arterial Blood Updated: 03/06/25 1920     Site Arterial Line     Alfonso's Test N/A     pH, Arterial 7.366 pH units      pCO2, Arterial 44.1 mm Hg      pO2, Arterial 92.9 mm Hg      HCO3, Arterial 25.2 mmol/L      Base Excess, Arterial -0.3 mmol/L      Comment: 84 Value below reference range        O2 Saturation, Arterial 97.6 %      Temperature 37.0     Barometric Pressure for Blood Gas 753 mmHg      Modality Ventilator     FIO2 50 %      Ventilator Mode SIMV     Set Tidal Volume 450.000     Set Mech Resp Rate 18.0     PEEP 5.0     PSV 10.0 cmH2O      Collected by [id number]     Comment: Meter: [id number]     :  Mary Espinoza, SONIA        pCO2, Temperature Corrected 44.1 mm Hg      pH, Temp Corrected 7.366 pH Units      pO2, Temperature Corrected 92.9 mm Hg      PO2/FIO2 186    POC Glucose Once [[id number]]  (Abnormal) Collected: 03/06/25 1846    Specimen: Blood Updated: 03/06/25 1857     Glucose 162 mg/dL      Comment: : [id number] Jacqueline CadwellMeter ID: [id number]       Renal Function Panel [[id number]]  (Abnormal) Collected: 03/06/25 1801    Specimen: Blood Updated: 03/06/25 1834     Glucose 170 mg/dL      BUN 14 mg/dL      Creatinine 1.05 mg/dL      Sodium 145 mmol/L      Potassium 3.9 mmol/L      Chloride 108 mmol/L      CO2 24.0 mmol/L      Calcium 8.6 mg/dL      Albumin 3.8 g/dL      Phosphorus 2.7 mg/dL      Anion Gap 13.0 mmol/L      BUN/Creatinine Ratio 13.3     eGFR 56.9 mL/min/1.73     Narrative:      GFR Categories in Chronic Kidney Disease (CKD)      GFR Category          GFR (mL/min/1.73)    Interpretation  G1                     90 or greater         Normal or high (1)  G2                      60-89                 Mild decrease (1)  G3a                   45-59                Mild to moderate decrease  G3b                   30-44                Moderate to severe decrease  G4                    15-29                Severe decrease  G5                    14 or less           Kidney failure          (1)In the absence of evidence of kidney disease, neither GFR category G1 or G2 fulfill the criteria for CKD.    eGFR calculation 2021 CKD-EPI creatinine equation, which does not include race as a factor    CBC (No Diff) [[id number]]  (Abnormal) Collected: 03/06/25 1801    Specimen: Blood Updated: 03/06/25 1817     WBC 8.38 10*3/mm3      RBC 3.26 10*6/mm3      Hemoglobin 9.8 g/dL      Hematocrit 29.4 %      MCV 90.2 fL      MCH 30.1 pg      MCHC 33.3 g/dL      RDW 12.8 %      RDW-SD 41.9 fl      MPV 9.8 fL      Platelets 143 10*3/mm3     POC Glucose Once [[id number]]  (Abnormal) Collected: 03/06/25 1759    Specimen: Blood Updated: 03/06/25 1810     Glucose 168 mg/dL      Comment: : [id number] Buccaneer CadwellMeter ID: [id number]       POC Glucose Once [[id number]]  (Abnormal) Collected: 03/06/25 1704    Specimen: Blood Updated: 03/06/25 1715     Glucose 195 mg/dL      Comment: : [id number] Jacqueline CadwellMeter ID: [id number]       Calcium, Ionized [[id number]] Collected: 03/06/25 1636    Specimen: Blood Updated: 03/06/25 1638     Ionized Calcium 4.67 mg/dL      Collected by [id number]     Comment: Meter: [id number]     :  Carmine Zelaya CRT       Blood Gas, Arterial - [[id number]]  (Abnormal) Collected: 03/06/25 1633    Specimen: Arterial Blood Updated: 03/06/25 1637     Site Arterial Line     Alfonso's Test N/A     pH, Arterial 7.377 pH units      pCO2, Arterial 47.1 mm Hg      Comment: 83 Value above reference range        pO2, Arterial 78.5 mm Hg      Comment: 84 Value below reference range        HCO3, Arterial 27.6 mmol/L      Comment: 83 Value above reference range        Base Excess, Arterial 2.0 mmol/L      O2  Saturation, Arterial 95.6 %      Temperature 37.0     Barometric Pressure for Blood Gas 753 mmHg      Modality Ventilator     FIO2 60 %      Ventilator Mode SIMV     Set Tidal Volume 450.000     Set Mech Resp Rate 18.0     PEEP 5.0     PSV 10.0 cmH2O      Collected by [id number]     Comment: Meter: [id number]     :  Carmine Zelaya, CRT        pCO2, Temperature Corrected 47.1 mm Hg      pH, Temp Corrected 7.377 pH Units      pO2, Temperature Corrected 78.5 mm Hg      PO2/FIO2 131    POC Glucose Once [[id number]]  (Abnormal) Collected: 03/06/25 1605    Specimen: Blood Updated: 03/06/25 1616     Glucose 221 mg/dL      Comment: : [id number] Jacqueline CadwellMeter ID: [id number]       CBC (No Diff) [[id number]]  (Abnormal) Collected: 03/06/25 1504    Specimen: Blood, Arterial Line Updated: 03/06/25 1525     WBC 10.03 10*3/mm3      RBC 3.41 10*6/mm3      Hemoglobin 10.3 g/dL      Hematocrit 30.7 %      MCV 90.0 fL      MCH 30.2 pg      MCHC 33.6 g/dL      RDW 12.8 %      RDW-SD 42.5 fl      MPV 9.5 fL      Platelets 143 10*3/mm3     Renal Function Panel [[id number]]  (Abnormal) Collected: 03/06/25 1419    Specimen: Blood, Arterial Line Updated: 03/06/25 1448     Glucose 155 mg/dL      BUN 13 mg/dL      Creatinine 1.04 mg/dL      Sodium 145 mmol/L      Potassium 3.5 mmol/L      Comment: Slight hemolysis detected by analyzer. Result may be falsely elevated.        Chloride 106 mmol/L      CO2 25.0 mmol/L      Calcium 9.4 mg/dL      Albumin 3.6 g/dL      Phosphorus 2.6 mg/dL      Anion Gap 14.0 mmol/L      BUN/Creatinine Ratio 12.5     eGFR 57.6 mL/min/1.73     Narrative:      GFR Categories in Chronic Kidney Disease (CKD)      GFR Category          GFR (mL/min/1.73)    Interpretation  G1                     90 or greater         Normal or high (1)  G2                      60-89                Mild decrease (1)  G3a                   45-59                Mild to moderate decrease  G3b                   30-44                 Moderate to severe decrease  G4                    15-29                Severe decrease  G5                    14 or less           Kidney failure          (1)In the absence of evidence of kidney disease, neither GFR category G1 or G2 fulfill the criteria for CKD.    eGFR calculation 2021 CKD-EPI creatinine equation, which does not include race as a factor    Protime-INR [[id number]]  (Abnormal) Collected: 03/06/25 1419    Specimen: Blood, Arterial Line Updated: 03/06/25 1437     Protime 15.7 Seconds      INR 1.19    aPTT [[id number]]  (Normal) Collected: 03/06/25 1419    Specimen: Blood, Arterial Line Updated: 03/06/25 1437     PTT 32.0 seconds     Narrative:      PTT = The equivalent PTT values for the therapeutic range of heparin levels at 0.3 to 0.7 U/ml are 77 - 99 seconds.    Fibrinogen [[id number]]  (Normal) Collected: 03/06/25 1419    Specimen: Blood, Arterial Line Updated: 03/06/25 1437     Fibrinogen 351 mg/dL     Calcium, Ionized [[id number]] Collected: 03/06/25 1424    Specimen: Blood Updated: 03/06/25 1426     Ionized Calcium 4.76 mg/dL      Collected by [id number]     Comment: Meter: [id number]     :  Carmine Zelaya CRT       Blood Gas, Arterial - [[id number]]  (Abnormal) Collected: 03/06/25 1420    Specimen: Arterial Blood Updated: 03/06/25 1424     Site Arterial Line     Alfonso's Test N/A     pH, Arterial 7.401 pH units      pCO2, Arterial 42.8 mm Hg      pO2, Arterial 67.7 mm Hg      Comment: 84 Value below reference range        HCO3, Arterial 26.6 mmol/L      Comment: 83 Value above reference range        Base Excess, Arterial 1.6 mmol/L      O2 Saturation, Arterial 93.7 %      Comment: 84 Value below reference range        Temperature 35.4     Barometric Pressure for Blood Gas 753 mmHg      Modality Ventilator     FIO2 70 %      Ventilator Mode SIMV     Set Tidal Volume 450.000     Set Mech Resp Rate 20.0     PEEP 5.0     PSV 10.0 cmH2O      Collected by [id number]     Comment: Meter:  [id number]     :  Carmine Zelaya, CRT        pCO2, Temperature Corrected 39.9 mm Hg      pH, Temp Corrected 7.425 pH Units      pO2, Temperature Corrected 61.0 mm Hg      PO2/FIO2 97    Arterial Blood Gas with Coox and Lactate [[id number]]  (Abnormal) Collected: 03/06/25 1255    Specimen: Arterial Blood Updated: 03/06/25 1257     pH, Arterial 7.447 pH units      pCO2, Arterial 33.9 mm Hg      Comment: 84 Value below reference range        pO2, Arterial --     HCO3, Arterial 23.3 mmol/L      Base Excess, Arterial -0.5 mmol/L      Comment: 84 Value below reference range        Hematocrit, Blood Gas 26.2 %      Comment: 84 Value below reference range        Hemoglobin, Blood Gas 8.6 g/dL      Comment: 84 Value below reference range        O2 Saturation, Arterial >100.1 %      Comment: 93 Value above reportable range > 100.1        Oxyhemoglobin 98.1 %      Carboxyhemoglobin 1.2 %      Methemoglobin 1.10 %      Sodium, Arterial 141 mmol/L      Potassium, Arterial 3.9 mmol/L      Ionized Calcium 5.19 mg/dL      A-a DO2 --     Temperature 37.0     Barometric Pressure for Blood Gas 754 mmHg      pH, Temp Corrected 7.447 pH Units      pCO2, Temperature Corrected 33.9 mm Hg      pO2, Temperature Corrected 312 mm Hg      Glucose, Arterial 161 mg/dL      Comment: 83 Value above reference range        Site Arterial Line     Alfonso's Test N/A     Modality Ventilator     FIO2 100 %      Flow Rate --     Nitric Oxide --     Ventilator Mode NA     Set Tidal Volume --     Set Mech Resp Rate --     Rate --     PEEP --     PSV --     PIP --     IPAP --     EPAP --     CPAP --     Pulse Ox --     Notified [facility] ADAN     Notified By [id number]     Notified Time 03/06/2025 12:56     Collected by [id number]     Comment: Meter: [id number]     :  [id number]        Note --     Lactate, whole blood 4.2 mmol/L      Comment: 86 Value above critical limit       Fibrinogen [[id number]]  (Abnormal) Collected: 03/06/25 2214     Specimen: Blood, Arterial Line Updated: 03/06/25 1240     Fibrinogen 141 mg/dL     Protime-INR [[id number]]  (Abnormal) Collected: 03/06/25 1158    Specimen: Blood, Arterial Line Updated: 03/06/25 1240     Protime 25.8 Seconds      INR 2.21    CBC (No Diff) [[id number]]  (Abnormal) Collected: 03/06/25 1158    Specimen: Blood, Arterial Line Updated: 03/06/25 1219     WBC 8.65 10*3/mm3      RBC 2.53 10*6/mm3      Hemoglobin 7.9 g/dL      Hematocrit 22.7 %      MCV 89.7 fL      MCH 31.2 pg      MCHC 34.8 g/dL      RDW 12.7 %      RDW-SD 41.1 fl      MPV 9.8 fL      Platelets 121 10*3/mm3     Arterial Blood Gas with Coox and Lactate [[id number]]  (Abnormal) Collected: 03/06/25 1201    Specimen: Arterial Blood Updated: 03/06/25 1207     pH, Arterial 7.441 pH units      pCO2, Arterial 37.1 mm Hg      pO2, Arterial --     HCO3, Arterial 25.2 mmol/L      Base Excess, Arterial 1.1 mmol/L      Hematocrit, Blood Gas 25.1 %      Comment: 84 Value below reference range        Hemoglobin, Blood Gas 8.2 g/dL      Comment: 84 Value below reference range        O2 Saturation, Arterial >100.1 %      Comment: 93 Value above reportable range > 100.1        Oxyhemoglobin 98.3 %      Carboxyhemoglobin 1.2 %      Methemoglobin 1.20 %      Sodium, Arterial 139 mmol/L      Potassium, Arterial 4.1 mmol/L      Ionized Calcium 4.99 mg/dL      A-a DO2 --     Temperature 37.0     Barometric Pressure for Blood Gas 754 mmHg      pH, Temp Corrected 7.441 pH Units      pCO2, Temperature Corrected 37.1 mm Hg      pO2, Temperature Corrected 466 mm Hg      Glucose, Arterial 171 mg/dL      Comment: 83 Value above reference range        Site Arterial Line     Alfonso's Test N/A     Modality pump     FIO2 100 %      Flow Rate 2.0 lpm      Nitric Oxide --     Ventilator Mode NA     Set Tidal Volume --     Set Mech Resp Rate --     Rate --     PEEP --     PSV --     PIP --     IPAP --     EPAP --     CPAP --     Pulse Ox --     Notified Who ADAN     Notified By  [id number]     Notified Time 03/06/2025 12:07     Collected by [id number]     Comment: Meter: [id number]     :  [id number]        Note --     Lactate, whole blood 2.6 mmol/L      Comment: 86 Value above critical limit       Arterial Blood Gas with Coox and Lactate [[id number]]  (Abnormal) Collected: 03/06/25 1111    Specimen: Arterial Blood Updated: 03/06/25 1115     pH, Arterial 7.371 pH units      pCO2, Arterial 45.7 mm Hg      Comment: 83 Value above reference range        pO2, Arterial --     HCO3, Arterial 26.5 mmol/L      Comment: 83 Value above reference range        Base Excess, Arterial 1.0 mmol/L      Hematocrit, Blood Gas 26.5 %      Comment: 84 Value below reference range        Hemoglobin, Blood Gas 8.6 g/dL      Comment: 84 Value below reference range        O2 Saturation, Arterial >100.1 %      Comment: 93 Value above reportable range > 100.1        Oxyhemoglobin 98.4 %      Carboxyhemoglobin 0.9 %      Methemoglobin 1.00 %      Sodium, Arterial 143 mmol/L      Potassium, Arterial 3.9 mmol/L      Ionized Calcium 4.13 mg/dL      Comment: 84 Value below reference range        A-a DO2 --     Temperature 30.0     Barometric Pressure for Blood Gas 754 mmHg      pH, Temp Corrected 7.473 pH Units      Comment: 83 Value above reference range        pCO2, Temperature Corrected 33.7 mm Hg      pO2, Temperature Corrected 386 mm Hg      Glucose, Arterial 154 mg/dL      Comment: 83 Value above reference range        Site Arterial Line     Alfonso's Test N/A     Modality pump     FIO2 70 %      Flow Rate 1.5 lpm      Nitric Oxide --     Ventilator Mode NA     Set Tidal Volume --     Set Mech Resp Rate --     Rate --     PEEP --     PSV --     PIP --     IPAP --     EPAP --     CPAP --     Pulse Ox --     Notified Who ADAN     Notified By [id number]     Notified Time 03/06/2025 11:14     Collected by [id number]     Comment: Meter: [id number]     :  [id number]        Note --     Lactate, whole blood 2.6 mmol/L       Comment: 86 Value above critical limit       Arterial Blood Gas with Coox and Lactate [[id number]]  (Abnormal) Collected: 03/06/25 0957    Specimen: Arterial Blood Updated: 03/06/25 1001     pH, Arterial 7.436 pH units      pCO2, Arterial 43.7 mm Hg      pO2, Arterial --     HCO3, Arterial 29.3 mmol/L      Comment: 83 Value above reference range        Base Excess, Arterial 4.6 mmol/L      Comment: 83 Value above reference range        Hematocrit, Blood Gas 26.3 %      Comment: 84 Value below reference range        Hemoglobin, Blood Gas 8.6 g/dL      Comment: 84 Value below reference range        O2 Saturation, Arterial >100.1 %      Comment: 93 Value above reportable range > 100.1        Oxyhemoglobin 98.8 %      Carboxyhemoglobin 1.0 %      Methemoglobin 0.80 %      Sodium, Arterial 143 mmol/L      Potassium, Arterial 4.2 mmol/L      Ionized Calcium 4.08 mg/dL      Comment: 84 Value below reference range        A-a DO2 --     Temperature 27.0     Barometric Pressure for Blood Gas 755 mmHg      pH, Temp Corrected 7.585 pH Units      Comment: 86 Value above critical limit        pCO2, Temperature Corrected 28.2 mm Hg      pO2, Temperature Corrected 456 mm Hg      Glucose, Arterial 109 mg/dL      Comment: 83 Value above reference range        Site Arterial Line     Alfonso's Test N/A     Modality pump     FIO2 70 %      Flow Rate 1.5 lpm      Nitric Oxide --     Ventilator Mode NA     Set Tidal Volume --     Set Mech Resp Rate --     Rate --     PEEP --     PSV --     PIP --     IPAP --     EPAP --     CPAP --     Pulse Ox --     Notified Who ADAN     Notified By [id number]     Notified Time 03/06/2025 10:00     Collected by [id number]     Comment: Meter: [id number]     :  [id number]        Note --     Lactate, whole blood 1.3 mmol/L     Arterial Blood Gas with Coox and Lactate [[id number]]  (Abnormal) Collected: 03/06/25 0913    Specimen: Arterial Blood Updated: 03/06/25 0921     pH, Arterial 7.403 pH units       pCO2, Arterial 43.0 mm Hg      pO2, Arterial --     HCO3, Arterial 26.8 mmol/L      Comment: 83 Value above reference range        Base Excess, Arterial 1.7 mmol/L      Hematocrit, Blood Gas 38.0 %      Hemoglobin, Blood Gas 12.4 g/dL      O2 Saturation, Arterial >100.1 %      Comment: 93 Value above reportable range > 100.1        Oxyhemoglobin 98.9 %      Carboxyhemoglobin 0.9 %      Methemoglobin 0.60 %      Sodium, Arterial 142 mmol/L      Potassium, Arterial 3.7 mmol/L      Ionized Calcium 4.82 mg/dL      A-a DO2 --     Temperature 37.0     Barometric Pressure for Blood Gas 754 mmHg      pH, Temp Corrected 7.403 pH Units      pCO2, Temperature Corrected 43.0 mm Hg      pO2, Temperature Corrected 320 mm Hg      Glucose, Arterial 110 mg/dL      Comment: 83 Value above reference range        Site Arterial Line     Alfonso's Test N/A     Modality Ventilator     FIO2 100 %      Flow Rate --     Nitric Oxide --     Ventilator Mode NA     Set Tidal Volume --     Set Mech Resp Rate --     Rate --     PEEP --     PSV --     PIP --     IPAP --     EPAP --     CPAP --     Pulse Ox --     Notified Who --     Notified By --     Notified Time --     Collected by [id number]     Comment: Meter: [id number]     :  [id number]        Note --     Lactate, whole blood 1.2 mmol/L     Arterial Blood Gas with Coox and Lactate [[id number]]  (Abnormal) Collected: 03/06/25 0810    Specimen: Arterial Blood Updated: 03/06/25 0811     pH, Arterial 7.470 pH units      Comment: 83 Value above reference range        pCO2, Arterial 33.6 mm Hg      Comment: 84 Value below reference range        pO2, Arterial --     HCO3, Arterial 24.5 mmol/L      Base Excess, Arterial 1.2 mmol/L      Hematocrit, Blood Gas 38.5 %      Hemoglobin, Blood Gas 12.6 g/dL      O2 Saturation, Arterial >100.1 %      Comment: 93 Value above reportable range > 100.1        Oxyhemoglobin 99.2 %      Comment: 83 Value above reference range        Carboxyhemoglobin 0.8 %       Methemoglobin 0.50 %      Sodium, Arterial 144 mmol/L      Potassium, Arterial 3.5 mmol/L      Comment: 84 Value below reference range        Ionized Calcium 4.48 mg/dL      Comment: 84 Value below reference range        A-a DO2 --     Temperature 37.0     Barometric Pressure for Blood Gas 754 mmHg      pH, Temp Corrected 7.470 pH Units      Comment: 83 Value above reference range        pCO2, Temperature Corrected 33.6 mm Hg      pO2, Temperature Corrected 390 mm Hg      Glucose, Arterial 105 mg/dL      Comment: 83 Value above reference range        Site Arterial Line     Alfonso's Test N/A     Modality Ventilator     FIO2 100 %      Flow Rate --     Nitric Oxide --     Ventilator Mode NA     Set Tidal Volume --     Set Mech Resp Rate --     Rate --     PEEP --     PSV --     PIP --     IPAP --     EPAP --     CPAP --     Pulse Ox --     Notified Who --     Notified By --     Notified Time --     Collected by [id number]     Comment: Meter: [id number]     :  ZORAIDA        Note --     Lactate, whole blood 0.9 mmol/L             Rhythm: Sinus 69 - 86    CXR: Normal postoperative changes.    Assessment & Plan    Bicuspid aortic valve with severe aortic stenosis and ascending aortic aneurysm - S/P Aortic hemiarch and ascending aortic replacement with deep hypothermic circulatory arrest, aortic valve replacement (CE Inspiris 21 mm valve) on 03/06/2025 -- patient is doing well, now oxygenating on 2 lpm via NC. Ambulating with PT. On ASA 81 mg daily, Plavix 75 mg daily, amiodarone 400 mg BID. Will increase metoprolol to 25 mg BID. Continue lasix 20 mg IV BID with K replacement.   Essential HTN - good control      Patient is doing well from surgical standpoint. Keep MCT, continue to monitor output. Remove torres. Transfer to .       Beverly Zee, SHONDA

## 2025-03-08 NOTE — PLAN OF CARE
Goal Outcome Evaluation:      Patient stood and ambulated 200' with CGA. Decreased chai. Worked on breathing. Hr 83 - 87. SATs on 2 lpm 92 - 93%. Tolerated walk well.

## 2025-03-08 NOTE — PLAN OF CARE
Problem: Adult Inpatient Plan of Care  Goal: Plan of Care Review  Outcome: Progressing  Flowsheets (Taken 3/7/2025 1855)  Progress: improving  Outcome Evaluation: VSS. O2 titrated to 3L NC. Diuresed this shift. Adequate UOP. No BM. Ambulated x3 (1/2 laps around unit and back to room). Pain meds given x1 with good results. Did have episodes of n/v each time pt. got up to walk. Zofran given x2. Family @ bedside. No new issues today.  Plan of Care Reviewed With:   patient   spouse  Goal: Patient-Specific Goal (Individualized)  Outcome: Progressing  Goal: Absence of Hospital-Acquired Illness or Injury  Outcome: Progressing  Intervention: Identify and Manage Fall Risk  Description: Perform standard risk assessment on admission using a validated tool or comprehensive approach appropriate to the patient; reassess fall risk frequently, with change in status or transfer to another level of care.  Communicate risk to interprofessional healthcare team; ensure fall risk visible cue.  Determine need for increased observation, equipment and environmental modification, as well as use of supportive, nonskid footwear.  Adjust safety measures to individual needs and identified risk factors.  Reinforce the importance of active participation with fall risk prevention, safety, and physical activity with the patient and family.  Perform regular intentional rounding to assess need for position change, pain assessment and personal needs, including assistance with toileting.  Recent Flowsheet Documentation  Taken 3/7/2025 1900 by Jennifer Styles, RN  Safety Promotion/Fall Prevention: safety round/check completed  Taken 3/7/2025 1800 by Jennifer Styles, RN  Safety Promotion/Fall Prevention: safety round/check completed  Taken 3/7/2025 1700 by Jennifer Styles, RN  Safety Promotion/Fall Prevention: safety round/check completed  Taken 3/7/2025 1600 by Jennifer Styles, RN  Safety Promotion/Fall Prevention: safety round/check  completed  Taken 3/7/2025 1500 by Jennifer Styles RN  Safety Promotion/Fall Prevention: safety round/check completed  Taken 3/7/2025 1400 by Jennifer Styles RN  Safety Promotion/Fall Prevention: safety round/check completed  Taken 3/7/2025 1300 by Jennifer Styles RN  Safety Promotion/Fall Prevention: safety round/check completed  Taken 3/7/2025 1200 by Jennifer Styles RN  Safety Promotion/Fall Prevention: safety round/check completed  Taken 3/7/2025 1100 by Jennifer Styles RN  Safety Promotion/Fall Prevention: safety round/check completed  Taken 3/7/2025 1000 by Jennifer Styles RN  Safety Promotion/Fall Prevention: safety round/check completed  Taken 3/7/2025 0900 by Jennifer Styles RN  Safety Promotion/Fall Prevention: safety round/check completed  Taken 3/7/2025 0745 by Jennifer Styles RN  Safety Promotion/Fall Prevention: safety round/check completed  Taken 3/7/2025 0700 by Jennifer Stlyes RN  Safety Promotion/Fall Prevention: safety round/check completed  Intervention: Prevent Skin Injury  Description: Perform a screening for skin injury risk, such as pressure or moisture-associated skin damage on admission and at regular intervals throughout hospital stay.  Keep all areas of skin (especially folds) clean and dry.  Maintain adequate skin hydration.  Relieve and redistribute pressure and protect bony prominences and skin at risk for injury; implement measures based on patient-specific risk factors.  Match turning and repositioning schedule to clinical condition.  Encourage weight shift frequently; assist with reposition if unable to complete independently.  Float heels off bed; avoid pressure on the Achilles tendon.  Keep skin free from extended contact with medical devices.  Optimize nutrition and hydration.  Encourage functional activity and mobility, as early as tolerated.  Use aids (e.g., slide boards, mechanical lift) during transfer.  Recent Flowsheet Documentation  Taken 3/7/2025  1900 by Jennifer Styles RN  Body Position:   position maintained   supine  Taken 3/7/2025 1800 by Jennifer Styles RN  Body Position:   weight shifting   supine  Taken 3/7/2025 1700 by Jennifer Styles RN  Body Position:   position maintained   tilted   right  Taken 3/7/2025 1600 by Jennifer Styles RN  Body Position:   weight shifting   tilted   right  Taken 3/7/2025 1500 by Jennifer Styles RN  Body Position:   position maintained   tilted   left  Taken 3/7/2025 1400 by Jennifer Styles RN  Body Position:   weight shifting   tilted   left  Taken 3/7/2025 1300 by Jennifer Styles RN  Body Position:   position maintained   supine  Taken 3/7/2025 1200 by Jennifer Styles RN  Body Position:   weight shifting   supine  Taken 3/7/2025 1100 by Jennifer Styles RN  Body Position:   position maintained   tilted   left  Taken 3/7/2025 1000 by Jennifer Styles RN  Body Position:   weight shifting   tilted   left  Taken 3/7/2025 0900 by Jennifer Styles RN  Body Position:   weight shifting   supine  Taken 3/7/2025 0745 by Jennifer Styles RN  Skin Protection: incontinence pads utilized  Taken 3/7/2025 0700 by Jennifer Styles RN  Body Position:   position maintained   tilted   right  Intervention: Prevent and Manage VTE (Venous Thromboembolism) Risk  Description: Assess for VTE (venous thromboembolism) risk.  Promote early mobilization; encourage both active and passive leg exercises, if unable to ambulate.  Initiate and maintain compression or other therapy, as indicated, based on identified risk in accordance with organizational protocol and provider order.  Recognize the patient's individual risk for bleeding before initiating pharmacologic thromboprophylaxis.  Recent Flowsheet Documentation  Taken 3/7/2025 0745 by Jennifer Styles RN  VTE Prevention/Management: (See MAR)   bilateral   SCDs (sequential compression devices) on   other (see comments)  Goal: Optimal Comfort and Wellbeing  Outcome:  Progressing  Intervention: Monitor Pain and Promote Comfort  Description: Assess pain level, treatment efficacy and patient response at regular intervals using a consistent pain scale.  Consider the presence and impact of preexisting chronic pain.  Encourage patient and caregiver involvement in pain assessment, interventions and safety measures.  Promote activity; balance with sleep and rest to enhance healing.  Recent Flowsheet Documentation  Taken 3/7/2025 1900 by Jennifer Styles RN  Pain Management Interventions: quiet environment facilitated  Taken 3/7/2025 1800 by Jennifer Styles RN  Pain Management Interventions: quiet environment facilitated  Taken 3/7/2025 1700 by Jennifer Styles RN  Pain Management Interventions: quiet environment facilitated  Taken 3/7/2025 1630 by Jennifer Styles RN  Pain Management Interventions: quiet environment facilitated  Taken 3/7/2025 1600 by Jennifer Styles RN  Pain Management Interventions: quiet environment facilitated  Taken 3/7/2025 1540 by Jennifer Styles RN  Pain Management Interventions: pain medication given  Taken 3/7/2025 1400 by Jennifer Styles RN  Pain Management Interventions: quiet environment facilitated  Taken 3/7/2025 1300 by Jennifer Styles RN  Pain Management Interventions: quiet environment facilitated  Taken 3/7/2025 1230 by Jennifer Styles RN  Pain Management Interventions: quiet environment facilitated  Taken 3/7/2025 1200 by Jennifer Styles RN  Pain Management Interventions: quiet environment facilitated  Taken 3/7/2025 1100 by Jennifer Styles RN  Pain Management Interventions: quiet environment facilitated  Taken 3/7/2025 1000 by Jennifer Styles RN  Pain Management Interventions: quiet environment facilitated  Taken 3/7/2025 0900 by Jennifer Styles RN  Pain Management Interventions: quiet environment facilitated  Taken 3/7/2025 0745 by Jennifer Styles RN  Pain Management Interventions: quiet environment  facilitated  Taken 3/7/2025 0700 by Jennifer Styles, RN  Pain Management Interventions: quiet environment facilitated  Intervention: Provide Person-Centered Care  Description: Use a family-focused approach to care; encourage support system presence and participation.  Develop trust and rapport by proactively providing information, encouraging questions, addressing concerns and offering reassurance.  Acknowledge emotional response to hospitalization.  Recognize and utilize personal coping strategies and strengths; develop goals via shared decision-making.  Honor spiritual and cultural preferences.  Recent Flowsheet Documentation  Taken 3/7/2025 0745 by Jennifer Styles, RN  Trust Relationship/Rapport: care explained  Goal: Readiness for Transition of Care  Outcome: Progressing     Problem: Skin Injury Risk Increased  Goal: Skin Health and Integrity  Outcome: Progressing  Intervention: Optimize Skin Protection  Description: Perform a full pressure injury risk assessment, as indicated by screening, upon admission to care unit.  Reassess skin (full inspection and injury risk, including skin temperature, consistency and color) frequently (e.g., scheduled interval, with change in condition) to provide optimal early detection and prevention.  Maintain adequate tissue perfusion (e.g., encourage fluid balance; avoid crossing legs, constrictive clothing or devices) to promote tissue oxygenation.  Maintain head of bed at lowest degree of elevation tolerated, considering medical condition and other restrictions. Use positioning supports to prevent sliding and friction. Consider low friction textiles.  Avoid positioning onto an area that remains reddened or on bony prominences.  Minimize incontinence and moisture (e.g., toileting schedule; moisture-wicking pad, diaper or incontinence collection device; skin moisture barrier).  Cleanse skin promptly and gently, when soiled, utilizing a pH-balanced cleanser.  Relieve and  redistribute pressure (e.g., scheduled position changes, weight shifts, use of support surface, medical device repositioning, protective dressing application, use of positioning device, microclimate control, use of pressure-injury-monitor  Encourage increased activity, such as sitting in a chair at the bedside or early mobilization, when able to tolerate. Avoid prolonged sitting.  Recent Flowsheet Documentation  Taken 3/7/2025 1900 by Jennifer Styles, RN  Activity Management: up in chair  Taken 3/7/2025 1800 by Jennifer Styles RN  Activity Management: up in chair  Taken 3/7/2025 1700 by Jennifer Styles RN  Activity Management: up in chair  Taken 3/7/2025 1600 by Jennifer Styles RN  Activity Management: up in chair  Taken 3/7/2025 1500 by Jennifer Styles RN  Activity Management: up in chair  Taken 3/7/2025 1400 by Jennifer Styles RN  Activity Management: up in chair  Taken 3/7/2025 1300 by Jennifer Styles RN  Activity Management: up in chair  Taken 3/7/2025 1230 by Jennifer Styles RN  Activity Management: up in chair  Taken 3/7/2025 1200 by Jennifer Styles RN  Activity Management: up in chair  Taken 3/7/2025 1100 by Jennifer Styles RN  Activity Management: up in chair  Taken 3/7/2025 1000 by Jennifer Styles RN  Activity Management: up in chair  Taken 3/7/2025 0900 by Jennifer Styles RN  Activity Management: up in chair  Taken 3/7/2025 0745 by Jennifer Styles RN  Activity Management: up in chair  Pressure Reduction Techniques:   frequent weight shift encouraged   weight shift assistance provided  Pressure Reduction Devices: feet on footrest/footstool  Skin Protection: incontinence pads utilized  Taken 3/7/2025 0700 by Jennifer Styles, RN  Activity Management: up in chair     Problem: Comorbidity Management  Goal: Maintenance of Asthma Control  Outcome: Progressing  Goal: Maintenance of Behavioral Health Symptom Control  Outcome: Progressing  Goal: Maintenance of COPD Symptom  Control  Outcome: Progressing  Goal: Blood Glucose Level Within Target Range  Outcome: Progressing  Goal: Maintenance of Heart Failure Symptom Control  Outcome: Progressing  Goal: Blood Pressure in Desired Range  Outcome: Progressing  Goal: Maintenance of Osteoarthritis Symptom Control  Outcome: Progressing  Intervention: Maintain Osteoarthritis Symptom Control  Description: Evaluate adherence to self-management plan, such as medication, exercise and weight management.  Advocate for continuation of home regimen, such as medication and physical activity; monitor response.  Encourage participation in functional activities, such as mobility and ADLs (activities of daily living) to minimize decline associated with inactivity.  Facilitate use of patient-specific assistive devices, equipment or orthoses.  Evaluate effectiveness of coping skills; encourage expression of feelings, expectations and concerns related to disease management and quality of life; reinforce education to enhance management plan and wellbeing.  Recent Flowsheet Documentation  Taken 3/7/2025 1900 by Jennifer Styles RN  Activity Management: up in chair  Taken 3/7/2025 1800 by Jennifer Styles, RN  Activity Management: up in chair  Taken 3/7/2025 1700 by Jennifer Styles RN  Activity Management: up in chair  Taken 3/7/2025 1600 by Jennifer Styles RN  Activity Management: up in chair  Taken 3/7/2025 1500 by Jennifer Styles, RN  Activity Management: up in chair  Taken 3/7/2025 1400 by Jennifer Styles RN  Activity Management: up in chair  Taken 3/7/2025 1300 by Jennifer Styles RN  Activity Management: up in chair  Taken 3/7/2025 1230 by Jennifer Styles, RN  Activity Management: up in chair  Taken 3/7/2025 1200 by Jennifer Styles, RN  Activity Management: up in chair  Taken 3/7/2025 1100 by Jennifer Styles, RN  Activity Management: up in chair  Taken 3/7/2025 1000 by Jennifer Styles, RN  Activity Management: up in chair  Taken  3/7/2025 0900 by Jennifer Styles RN  Activity Management: up in chair  Taken 3/7/2025 0745 by Jennifer Styles RN  Activity Management: up in chair  Taken 3/7/2025 0700 by Jennifer Styles RN  Activity Management: up in chair  Goal: Bariatric Home Regimen Maintained  Outcome: Progressing  Goal: Maintenance of Seizure Control  Outcome: Progressing     Problem: Fall Injury Risk  Goal: Absence of Fall and Fall-Related Injury  Outcome: Progressing  Intervention: Promote Injury-Free Environment  Description: Provide a safe, barrier-free environment that encourages independent activity.  Keep care area uncluttered and well-lighted.  Determine need for increased observation or monitoring.  Avoid use of devices that minimize mobility, such as restraints or indwelling urinary catheter.  Recent Flowsheet Documentation  Taken 3/7/2025 1900 by Jennifer Styles RN  Safety Promotion/Fall Prevention: safety round/check completed  Taken 3/7/2025 1800 by Jennifer Styles RN  Safety Promotion/Fall Prevention: safety round/check completed  Taken 3/7/2025 1700 by Jennifer Styles RN  Safety Promotion/Fall Prevention: safety round/check completed  Taken 3/7/2025 1600 by Jennifer Styles RN  Safety Promotion/Fall Prevention: safety round/check completed  Taken 3/7/2025 1500 by Jennifer Styles RN  Safety Promotion/Fall Prevention: safety round/check completed  Taken 3/7/2025 1400 by Jennifer Styles RN  Safety Promotion/Fall Prevention: safety round/check completed  Taken 3/7/2025 1300 by Jennifer Styles RN  Safety Promotion/Fall Prevention: safety round/check completed  Taken 3/7/2025 1200 by Jennifer Styles RN  Safety Promotion/Fall Prevention: safety round/check completed  Taken 3/7/2025 1100 by Jennifer Styles RN  Safety Promotion/Fall Prevention: safety round/check completed  Taken 3/7/2025 1000 by Jennifer Styles RN  Safety Promotion/Fall Prevention: safety round/check completed  Taken 3/7/2025 0900  by Jennifer Styles, RN  Safety Promotion/Fall Prevention: safety round/check completed  Taken 3/7/2025 0745 by Jennifer Styles, RN  Safety Promotion/Fall Prevention: safety round/check completed  Taken 3/7/2025 0700 by Jennifer Styles, RN  Safety Promotion/Fall Prevention: safety round/check completed   Goal Outcome Evaluation:  Plan of Care Reviewed With: patient, spouse        Progress: improving  Outcome Evaluation: VSS. O2 titrated to 3L NC. Diuresed this shift. Adequate UOP. No BM. Ambulated x3 (1/2 laps around unit and back to room). Pain meds given x1 with good results. Did have episodes of n/v each time pt. got up to walk. Zofran given x2. Family @ bedside. No new issues today.

## 2025-03-08 NOTE — THERAPY TREATMENT NOTE
Acute Care - Physical Therapy Treatment Note  [facility]     Patient Name: Mary Salazar  : 1955  MRN: [id number]  Today's Date: 3/8/2025      Visit Dx:     ICD-10-CM ICD-9-CM   1. Impaired mobility [Z74.09]  Z74.09 799.89   2. Aortic stenosis, severe  I35.0 424.1   3. Aneurysm of ascending aorta without rupture  I71.21 441.2     Patient Active Problem List   Diagnosis    Bicuspid aortic valve    Hypertension    Aortic stenosis, severe    Aneurysm of ascending aorta without rupture    Aortic valve stenosis     Past Medical History:   Diagnosis Date    Aneurysm     Congenital heart disease     Coronary artery disease     GERD (gastroesophageal reflux disease)     Heart murmur     Heart valve disease     Hypertension      Past Surgical History:   Procedure Laterality Date    ASCENDING AORTIC ANEURYSM REPAIR W/ AORTIC VALVE REPLACEMENT N/A 3/6/2025    Procedure: AORTIC VALVE ASCENDING ANEURYSM REPAIR;  Surgeon: Marck Clark MD;  Location:  [facility] OR;  Service: Cardiothoracic;  Laterality: N/A;    ATRIAL APPENDAGE EXCLUSION LEFT WITH TRANSESOPHAGEAL ECHOCARDIOGRAM N/A 3/6/2025    Procedure: AORTIC VALVE REPLACEMENT (CE INSPIRIS 21MM VALVE), ASCENDING AORTIC ANEURYSM REPAIR, HEMIARCH AORTIC RESECTION WITH DEEP HYPOTHERMIC CIRCULATORY ARREST, ATRIAL APPENDAGE EXCLUSION LEFT 35MM, APPLICATION PREVENA, ULTRASOUND GUIDED FEMORAL ARTERIAL LINE INSERTION, TRANSESOPHAGEAL ECHOCARDIOGRAM;  Surgeon: Marck Clark MD;  Location:  [facility] OR;  Service: Cardiothoracic;  Laterality: N    CARDIAC CATHETERIZATION N/A 2021    Procedure: Left Heart Cath;  Surgeon: Alexis Randhawa MD;  Location:  [facility]ATH INVASIVE [facility];  Service: Cardiology;  Laterality: N/A;    CARDIAC CATHETERIZATION N/A 2025    Procedure: Left Heart Cath;  Surgeon: Alexis Randhawa MD;  Location:  [facility];  Service: Cardiology;  Laterality: N/A;    HYSTERECTOMY      LAPAROSCOPIC TUBAL LIGATION      TONSILLECTOMY        PT Assessment (Last 12 Hours)       PT Evaluation and Treatment       Row Name 03/08/25 0734          Physical Therapy Time and Intention    Subjective Information complains of;weakness;fatigue;pain  -MEGAN     Document Type therapy note (daily note)  -MEGAN     Mode of Treatment physical therapy  -MEGAN       Row Name 03/08/25 0734          General Information    Existing Precautions/Restrictions fall;oxygen therapy device and L/min;sternal  prevena, chest tube  -MEGAN       Row Name 03/08/25 0734          Pain    Pretreatment Pain Rating 4/10  -MEGAN     Posttreatment Pain Rating 6/10  -MEGAN     Pain Location chest  -MEGAN       Row Name 03/08/25 0734          Sit-Stand Transfer    Sit-Stand [place] (Transfers) verbal cues;contact guard  -MEGAN       Row Name 03/08/25 0734          Stand-Sit Transfer    Stand-Sit [place] (Transfers) verbal cues;contact guard  -MEGAN       Row Name 03/08/25 0734          Gait/Stairs (Locomotion)    [place] Level (Gait) contact guard  -MEGAN     Distance in Feet (Gait) 200  -MEGAN     Deviations/Abnormal Patterns (Gait) chai decreased  -MEGAN       Row Name 03/08/25 0734          Motor Skills    Comments, Therapeutic Exercise warm ups x 15  -MEGAN       Row Name             Wound 03/06/25 0841 midline sternal    Wound - Properties Group Placement Date: 03/06/25  - Placement Time: 0841  - Present on Original Admission: N  -LH Orientation: midline  -LH Location: sternal  -LH Primary Wound Type: Incision  -LH    Retired Wound - Properties Group Placement Date: 03/06/25  - Placement Time: 0841  - Present on Original Admission: N  -LH Orientation: midline  -LH Location: sternal  -LH Primary Wound Type: Incision  -LH    Retired Wound - Properties Group Placement Date: 03/06/25  - Placement Time: 0841  - Present on Original Admission: N  -LH Orientation: midline  -LH Location: sternal  -LH Primary Wound Type: Incision  -LH    Retired Wound - Properties Group Date first assessed: 03/06/25  [facility]  Time first assessed: 0841  - Present on Original Admission: N  - Location: sternal  - Primary Wound Type: Incision  -      Row Name 03/08/25 0734          Vital Signs    Pre Systolic BP Rehab 136  -MEGAN     Pre Treatment Diastolic BP 66  -MEGAN     Post Treatment Diastolic BP 83  -MEGAN     Intratreatment Heart Rate (beats/min) 87  -MEGAN     Pre SpO2 (%) 92  -MEGAN     O2 Delivery Pre Treatment --  2 lpm  -MEGAN     Post SpO2 (%) 93  -MEGAN       Row Name 03/08/25 0734          Positioning and Restraints    Pre-Treatment Position sitting in chair/recliner  -MEGAN     In Chair sitting;call light within reach;with nsg  breakfast  -MEGAN               User Key  (r) = Recorded By, (t) = Taken By, (c) = Cosigned By      Initials Name Provider Type    Kim Guerrero PTA Physical Therapist Assistant     Hanna Tavera, RN Registered Nurse                    Physical Therapy Education       Title: PT OT SLP Therapies (In Progress)       Topic: Physical Therapy (In Progress)       Point: Mobility training (Done)       Learning Progress Summary            Patient Acceptance, E,D, DU by MEGAN at 3/8/2025 0821    Comment: sit to stand    Acceptance, E, NR by ERNESTO at 3/7/2025 0802    Comment: benefits of activity, sternal prec                      Point: Home exercise program (Not Started)       Learner Progress:  Not documented in this visit.              Point: Body mechanics (Not Started)       Learner Progress:  Not documented in this visit.              Point: Precautions (In Progress)       Learning Progress Summary            Patient Acceptance, E, NR by ERNESTO at 3/7/2025 0802    Comment: benefits of activity, sternal prec                                      User Key       Initials Effective Dates Name Provider Type Discipline    ERNESTO 02/03/23 -  Maury Martini PT DPT Physical Therapist PT    MEGAN 02/03/23 -  Kim Etienne PTA Physical Therapist Assistant PT                  PT Recommendation and Plan         Outcome Measures        Row Name 03/08/25 0800             How much help from another person do you currently need...    Turning from your back to your side while in flat bed without using bedrails? 2  -MEGAN      Moving from lying on back to sitting on the side of a flat bed without bedrails? 2  -MEGAN      Moving to and from a bed to a chair (including a wheelchair)? 3  -MEGAN      Standing up from a chair using your arms (e.g., wheelchair, bedside chair)? 3  -MEGAN      Climbing 3-5 steps with a railing? 3  -MEGAN      To walk in hospital room? 3  -MEGAN      AM-PAC 6 Clicks Score (PT) 16  -MEGAN                User Key  (r) = Recorded By, (t) = Taken By, (c) = Cosigned By      Initials Name Provider Type    Kim Guerrero PTA Physical Therapist Assistant                     Time Calculation:    PT Charges       Row Name 03/08/25 0822             Time Calculation    Start Time 0734  -MEGAN      Stop Time 0759  -MEGAN      Time Calculation (min) 25 min  -MEGAN         Timed Charges    42065 - Gait Training Minutes  25  -MEGAN         Total Minutes    Timed Charges Total Minutes 25  -MEGAN       Total Minutes 25  -MEGAN                User Key  (r) = Recorded By, (t) = Taken By, (c) = Cosigned By      Initials Name Provider Type    Kim Guerrero PTA Physical Therapist Assistant                  Therapy Charges for Today       Code Description Service Date Service Provider Modifiers Qty    45707844547 HC GAIT TRAINING EA 15 MIN 3/7/2025 Kim Etienne PTA GP 2    39072388599 HC GAIT TRAINING EA 15 MIN 3/8/2025 Kim Etienne PTA GP 2            PT G-Codes  Outcome Measure Options: AM-PAC 6 Clicks Basic Mobility (PT)  AM-PAC 6 Clicks Score (PT): 16    Kim Etienne PTA  3/8/2025

## 2025-03-08 NOTE — PLAN OF CARE
Goal Outcome Evaluation:      Patient stood and ambulated 200' with CGA. Improved gait and breathing.

## 2025-03-09 ENCOUNTER — APPOINTMENT (OUTPATIENT)
Dept: GENERAL RADIOLOGY | Facility: HOSPITAL | Age: 70
DRG: 221 | End: 2025-03-09
Payer: MEDICARE

## 2025-03-09 LAB
ALBUMIN SERPL-MCNC: 3.6 G/DL (ref 3.5–5.2)
ALBUMIN/GLOB SERPL: 1.3 G/DL
ALP SERPL-CCNC: 62 U/L (ref 39–117)
ALT SERPL W P-5'-P-CCNC: 14 U/L (ref 1–33)
ANION GAP SERPL CALCULATED.3IONS-SCNC: 11 MMOL/L (ref 5–15)
AST SERPL-CCNC: 31 U/L (ref 1–32)
BASOPHILS # BLD AUTO: 0.03 10*3/MM3 (ref 0–0.2)
BASOPHILS NFR BLD AUTO: 0.2 % (ref 0–1.5)
BILIRUB SERPL-MCNC: 0.3 MG/DL (ref 0–1.2)
BUN SERPL-MCNC: 23 MG/DL (ref 8–23)
BUN/CREAT SERPL: 30.3 (ref 7–25)
CALCIUM SPEC-SCNC: 8.8 MG/DL (ref 8.6–10.5)
CHLORIDE SERPL-SCNC: 103 MMOL/L (ref 98–107)
CO2 SERPL-SCNC: 26 MMOL/L (ref 22–29)
CREAT SERPL-MCNC: 0.76 MG/DL (ref 0.57–1)
DEPRECATED RDW RBC AUTO: 47 FL (ref 37–54)
EGFRCR SERPLBLD CKD-EPI 2021: 83.9 ML/MIN/1.73
EOSINOPHIL # BLD AUTO: 0.01 10*3/MM3 (ref 0–0.4)
EOSINOPHIL NFR BLD AUTO: 0.1 % (ref 0.3–6.2)
ERYTHROCYTE [DISTWIDTH] IN BLOOD BY AUTOMATED COUNT: 14 % (ref 12.3–15.4)
GLOBULIN UR ELPH-MCNC: 2.7 GM/DL
GLUCOSE BLDC GLUCOMTR-MCNC: 115 MG/DL (ref 70–130)
GLUCOSE BLDC GLUCOMTR-MCNC: 122 MG/DL (ref 70–130)
GLUCOSE BLDC GLUCOMTR-MCNC: 132 MG/DL (ref 70–130)
GLUCOSE BLDC GLUCOMTR-MCNC: 141 MG/DL (ref 70–130)
GLUCOSE SERPL-MCNC: 124 MG/DL (ref 65–99)
HCT VFR BLD AUTO: 31.4 % (ref 34–46.6)
HGB BLD-MCNC: 10.1 G/DL (ref 12–15.9)
IMM GRANULOCYTES # BLD AUTO: 0.15 10*3/MM3 (ref 0–0.05)
IMM GRANULOCYTES NFR BLD AUTO: 0.9 % (ref 0–0.5)
LYMPHOCYTES # BLD AUTO: 1.22 10*3/MM3 (ref 0.7–3.1)
LYMPHOCYTES NFR BLD AUTO: 7.2 % (ref 19.6–45.3)
MCH RBC QN AUTO: 30 PG (ref 26.6–33)
MCHC RBC AUTO-ENTMCNC: 32.2 G/DL (ref 31.5–35.7)
MCV RBC AUTO: 93.2 FL (ref 79–97)
MONOCYTES # BLD AUTO: 1.31 10*3/MM3 (ref 0.1–0.9)
MONOCYTES NFR BLD AUTO: 7.7 % (ref 5–12)
NEUTROPHILS NFR BLD AUTO: 14.27 10*3/MM3 (ref 1.7–7)
NEUTROPHILS NFR BLD AUTO: 83.9 % (ref 42.7–76)
NRBC BLD AUTO-RTO: 0 /100 WBC (ref 0–0.2)
PLATELET # BLD AUTO: 157 10*3/MM3 (ref 140–450)
PMV BLD AUTO: 10.9 FL (ref 6–12)
POTASSIUM SERPL-SCNC: 4.6 MMOL/L (ref 3.5–5.2)
PROT SERPL-MCNC: 6.3 G/DL (ref 6–8.5)
RBC # BLD AUTO: 3.37 10*6/MM3 (ref 3.77–5.28)
SODIUM SERPL-SCNC: 140 MMOL/L (ref 136–145)
WBC NRBC COR # BLD AUTO: 16.99 10*3/MM3 (ref 3.4–10.8)

## 2025-03-09 PROCEDURE — 99024 POSTOP FOLLOW-UP VISIT: CPT | Performed by: SURGERY

## 2025-03-09 PROCEDURE — 25010000002 FUROSEMIDE PER 20 MG: Performed by: NURSE PRACTITIONER

## 2025-03-09 PROCEDURE — 80053 COMPREHEN METABOLIC PANEL: CPT | Performed by: NURSE PRACTITIONER

## 2025-03-09 PROCEDURE — 94664 DEMO&/EVAL PT USE INHALER: CPT

## 2025-03-09 PROCEDURE — 97116 GAIT TRAINING THERAPY: CPT

## 2025-03-09 PROCEDURE — 82948 REAGENT STRIP/BLOOD GLUCOSE: CPT

## 2025-03-09 PROCEDURE — 94799 UNLISTED PULMONARY SVC/PX: CPT

## 2025-03-09 PROCEDURE — 25010000002 ENOXAPARIN PER 10 MG: Performed by: NURSE PRACTITIONER

## 2025-03-09 PROCEDURE — 71046 X-RAY EXAM CHEST 2 VIEWS: CPT

## 2025-03-09 PROCEDURE — 85025 COMPLETE CBC W/AUTO DIFF WBC: CPT | Performed by: NURSE PRACTITIONER

## 2025-03-09 RX ADMIN — CLOPIDOGREL BISULFATE 75 MG: 75 TABLET ORAL at 17:51

## 2025-03-09 RX ADMIN — POTASSIUM CHLORIDE 20 MEQ: 750 CAPSULE, EXTENDED RELEASE ORAL at 17:51

## 2025-03-09 RX ADMIN — CHLORHEXIDINE GLUCONATE 0.12% ORAL RINSE 15 ML: 1.2 LIQUID ORAL at 05:11

## 2025-03-09 RX ADMIN — ASPIRIN 81 MG: 81 TABLET, COATED ORAL at 09:00

## 2025-03-09 RX ADMIN — PREGABALIN 25 MG: 25 CAPSULE ORAL at 14:35

## 2025-03-09 RX ADMIN — AMIODARONE HYDROCHLORIDE 400 MG: 200 TABLET ORAL at 09:00

## 2025-03-09 RX ADMIN — FUROSEMIDE 20 MG: 10 INJECTION, SOLUTION INTRAMUSCULAR; INTRAVENOUS at 09:00

## 2025-03-09 RX ADMIN — POLYETHYLENE GLYCOL 3350 17 G: 17 POWDER, FOR SOLUTION ORAL at 09:00

## 2025-03-09 RX ADMIN — AMIODARONE HYDROCHLORIDE 400 MG: 200 TABLET ORAL at 17:51

## 2025-03-09 RX ADMIN — OXYCODONE HYDROCHLORIDE 5 MG: 5 TABLET ORAL at 04:42

## 2025-03-09 RX ADMIN — Medication 1 APPLICATION: at 04:42

## 2025-03-09 RX ADMIN — LIDOCAINE 2 PATCH: 4 PATCH TOPICAL at 14:35

## 2025-03-09 RX ADMIN — METOPROLOL TARTRATE 25 MG: 25 TABLET, FILM COATED ORAL at 20:23

## 2025-03-09 RX ADMIN — Medication 1 APPLICATION: at 17:51

## 2025-03-09 RX ADMIN — PANTOPRAZOLE SODIUM 40 MG: 40 TABLET, DELAYED RELEASE ORAL at 05:11

## 2025-03-09 RX ADMIN — METOPROLOL TARTRATE 25 MG: 25 TABLET, FILM COATED ORAL at 09:00

## 2025-03-09 RX ADMIN — OXYCODONE HYDROCHLORIDE 5 MG: 5 TABLET ORAL at 13:32

## 2025-03-09 RX ADMIN — POTASSIUM CHLORIDE 20 MEQ: 750 CAPSULE, EXTENDED RELEASE ORAL at 09:00

## 2025-03-09 RX ADMIN — PREGABALIN 25 MG: 25 CAPSULE ORAL at 04:42

## 2025-03-09 RX ADMIN — IPRATROPIUM BROMIDE AND ALBUTEROL SULFATE 1.5 ML: .5; 3 SOLUTION RESPIRATORY (INHALATION) at 10:07

## 2025-03-09 RX ADMIN — FUROSEMIDE 20 MG: 10 INJECTION, SOLUTION INTRAMUSCULAR; INTRAVENOUS at 17:51

## 2025-03-09 RX ADMIN — ENOXAPARIN SODIUM 40 MG: 100 INJECTION SUBCUTANEOUS at 09:00

## 2025-03-09 RX ADMIN — ATORVASTATIN CALCIUM 20 MG: 10 TABLET, FILM COATED ORAL at 20:23

## 2025-03-09 RX ADMIN — IPRATROPIUM BROMIDE AND ALBUTEROL SULFATE 1.5 ML: .5; 3 SOLUTION RESPIRATORY (INHALATION) at 06:08

## 2025-03-09 RX ADMIN — IPRATROPIUM BROMIDE AND ALBUTEROL SULFATE 1.5 ML: .5; 3 SOLUTION RESPIRATORY (INHALATION) at 18:40

## 2025-03-09 RX ADMIN — BISACODYL 10 MG: 5 TABLET, COATED ORAL at 09:00

## 2025-03-09 NOTE — PLAN OF CARE
Goal Outcome Evaluation:   Patient stood and ambulated 300' with CGA and 1 standing rests. Walked 1 lpm and SATs 95 -96%. Will walk without Ox this afternoon.

## 2025-03-09 NOTE — PLAN OF CARE
Goal Outcome Evaluation:  Plan of Care Reviewed With: patient        Progress: improving  Outcome Evaluation: Sinus HR: 70-92 on tele. Patient is on room air. SOB noted with exertion. Fatigue/SOB noted after ambulating in the halls. O2 sats maintained 88-93% while patient was ambulating on room air. Patient did drop to 86% but then quickly recovered. Chest tube in place. 110 mL of output noted from chest tube. Patient voiding. Encouraged IS use. Safety maintained. Up in the chair this AM.

## 2025-03-09 NOTE — PLAN OF CARE
Goal Outcome Evaluation:  Plan of Care Reviewed With: patient           Outcome Evaluation: VSS. NSR on tele. C/o of pain and was treated with prn pain medicine. safety maintained.

## 2025-03-09 NOTE — PROGRESS NOTES
[facility] Cardiothoracic Surgery  PROGRESS NOTE       Procedure Date:3/6/2025  AORTIC VALVE REPLACEMENT (CE INSPIRIS 21MM VALVE), ASCENDING AORTIC ANEURYSM REPAIR, HEMIARCH AORTIC RESECTION WITH DEEP HYPOTHERMIC CIRCULATORY ARREST, ATRIAL APPENDAGE EXCLUSION LEFT 35MM, APPLICATION PREVENA, ULTRASOUND GUIDED FEMORAL ARTERIAL LINE INSERTION, TRANSESOPHAGEAL ECHOCARDIOGRAM  AORTIC VALVE ASCENDING ANEURYSM REPAIR    Subjective:  Sitting up in chair,  at bedside. Pain well controlled. Has ambulated with PT. Oxygenating well on RA.     Objective:      Intake/Output Summary (Last 24 hours) at 3/9/2025 1009  Last data filed at 3/9/2025 0515  Gross per 24 hour   Intake 180 ml   Output 2290 ml   Net -2110 ml       CT Output:   MCT to - 20 cm suction with 300 ml serosanguinous drainage in the past 24 hours. No air leak.     Gtt's:  None    Wt preop:  150  Wt current:       03/09/25 0418   Weight: 71.6 kg (157 lb 12.8 oz)         PE:  Vitals:    03/09/25 0728   BP: 117/67   Pulse: 79   Resp: 16   Temp: 98.1 °F (36.7 °C)   SpO2: 92%     GENERAL: NAD, resting comfortably, normal color  CARDIOVASCULAR: Normal HT with RRR. No murmurs, gallops or rubs.   PULMONARY: Clear bilateral breath sounds without wheezes or rhonchi.  CHEST: Sternotomy incision with Prevena intact with good seal. Sternum stable. No clicks.   ABDOMEN: Soft, non-tender/non-distended with active bowel sounds.   EXTREMITIES: No clubbing or cyanosis. Mild peripheral edema. Pedal pulses palpated.       Lab Results (last 72 hours)       Procedure Component Value Units Date/Time    POC Glucose Once [[id number]]  (Normal) Collected: 03/09/25 0730    Specimen: Blood Updated: 03/09/25 0741     Glucose 122 mg/dL      Comment: : [id number] Iker BrittanyMeter ID: [id number]       Comprehensive Metabolic Panel [[id number]]  (Abnormal) Collected: 03/09/25 0410    Specimen: Blood Updated: 03/09/25 0508     Glucose 124 mg/dL      BUN 23 mg/dL       Creatinine 0.76 mg/dL      Sodium 140 mmol/L      Potassium 4.6 mmol/L      Chloride 103 mmol/L      CO2 26.0 mmol/L      Calcium 8.8 mg/dL      Total Protein 6.3 g/dL      Albumin 3.6 g/dL      ALT (SGPT) 14 U/L      AST (SGOT) 31 U/L      Alkaline Phosphatase 62 U/L      Total Bilirubin 0.3 mg/dL      Globulin 2.7 gm/dL      A/G Ratio 1.3 g/dL      BUN/Creatinine Ratio 30.3     Anion Gap 11.0 mmol/L      eGFR 83.9 mL/min/1.73     Narrative:      GFR Categories in Chronic Kidney Disease (CKD)      GFR Category          GFR (mL/min/1.73)    Interpretation  G1                     90 or greater         Normal or high (1)  G2                      60-89                Mild decrease (1)  G3a                   45-59                Mild to moderate decrease  G3b                   30-44                Moderate to severe decrease  G4                    15-29                Severe decrease  G5                    14 or less           Kidney failure          (1)In the absence of evidence of kidney disease, neither GFR category G1 or G2 fulfill the criteria for CKD.    eGFR calculation 2021 CKD-EPI creatinine equation, which does not include race as a factor    CBC & Differential [[id number]]  (Abnormal) Collected: 03/09/25 0410    Specimen: Blood Updated: 03/09/25 0446    Narrative:      The following orders were created for panel order CBC & Differential.  Procedure                               Abnormality         Status                     ---------                               -----------         ------                     CBC Auto Differential[[id number]]        Abnormal            Final result                 Please view results for these tests on the individual orders.    CBC Auto Differential [[id number]]  (Abnormal) Collected: 03/09/25 0410    Specimen: Blood Updated: 03/09/25 0446     WBC 16.99 10*3/mm3      RBC 3.37 10*6/mm3      Hemoglobin 10.1 g/dL      Hematocrit 31.4 %      MCV 93.2 fL      MCH 30.0 pg       MCHC 32.2 g/dL      RDW 14.0 %      RDW-SD 47.0 fl      MPV 10.9 fL      Platelets 157 10*3/mm3      Neutrophil % 83.9 %      Lymphocyte % 7.2 %      Monocyte % 7.7 %      Eosinophil % 0.1 %      Basophil % 0.2 %      Immature Grans % 0.9 %      Neutrophils, Absolute 14.27 10*3/mm3      Lymphocytes, Absolute 1.22 10*3/mm3      Monocytes, Absolute 1.31 10*3/mm3      Eosinophils, Absolute 0.01 10*3/mm3      Basophils, Absolute 0.03 10*3/mm3      Immature Grans, Absolute 0.15 10*3/mm3      nRBC 0.0 /100 WBC     POC Glucose Once [[id number]]  (Abnormal) Collected: 03/08/25 2000    Specimen: Blood Updated: 03/08/25 2010     Glucose 140 mg/dL      Comment: : [id number] [place] CassandraMeter ID: [id number]       POC Glucose Once [[id number]]  (Abnormal) Collected: 03/08/25 1610    Specimen: Blood Updated: 03/08/25 1621     Glucose 141 mg/dL      Comment: : [id number] Oren AshtonMeter ID: [id number]       POC Glucose Once [[id number]]  (Abnormal) Collected: 03/08/25 1058    Specimen: Blood Updated: 03/08/25 1109     Glucose 152 mg/dL      Comment: : [id number] Christo AllysonMeter ID: [id number]       POC Glucose Once [[id number]]  (Abnormal) Collected: 03/08/25 0711    Specimen: Blood Updated: 03/08/25 0722     Glucose 164 mg/dL      Comment: : [id number] Villafuerte AllysonMeter ID: [id number]       Renal Function Panel [[id number]]  (Abnormal) Collected: 03/08/25 0431    Specimen: Blood Updated: 03/08/25 0500     Glucose 145 mg/dL      BUN 22 mg/dL      Creatinine 0.92 mg/dL      Sodium 137 mmol/L      Potassium 4.7 mmol/L      Chloride 103 mmol/L      CO2 24.0 mmol/L      Calcium 8.5 mg/dL      Albumin 3.3 g/dL      Phosphorus 3.2 mg/dL      Anion Gap 10.0 mmol/L      BUN/Creatinine Ratio 23.9     eGFR 66.7 mL/min/1.73     Narrative:      GFR Categories in Chronic Kidney Disease (CKD)      GFR Category          GFR (mL/min/1.73)    Interpretation  G1                     90 or greater         Normal or high  (1)  G2                      60-89                Mild decrease (1)  G3a                   45-59                Mild to moderate decrease  G3b                   30-44                Moderate to severe decrease  G4                    15-29                Severe decrease  G5                    14 or less           Kidney failure          (1)In the absence of evidence of kidney disease, neither GFR category G1 or G2 fulfill the criteria for CKD.    eGFR calculation 2021 CKD-EPI creatinine equation, which does not include race as a factor    Magnesium [[id number]]  (Normal) Collected: 03/08/25 0431    Specimen: Blood Updated: 03/08/25 0500     Magnesium 2.2 mg/dL     CBC & Differential [[id number]]  (Abnormal) Collected: 03/08/25 0431    Specimen: Blood Updated: 03/08/25 0438    Narrative:      The following orders were created for panel order CBC & Differential.  Procedure                               Abnormality         Status                     ---------                               -----------         ------                     CBC Auto Differential[[id number]]        Abnormal            Final result                 Please view results for these tests on the individual orders.    CBC Auto Differential [[id number]]  (Abnormal) Collected: 03/08/25 0431    Specimen: Blood Updated: 03/08/25 0438     WBC 18.53 10*3/mm3      RBC 3.04 10*6/mm3      Hemoglobin 9.2 g/dL      Hematocrit 27.5 %      MCV 90.5 fL      MCH 30.3 pg      MCHC 33.5 g/dL      RDW 14.1 %      RDW-SD 46.8 fl      MPV 10.2 fL      Platelets 131 10*3/mm3      Neutrophil % 85.1 %      Lymphocyte % 5.0 %      Monocyte % 8.3 %      Eosinophil % 0.0 %      Basophil % 0.1 %      Immature Grans % 1.5 %      Neutrophils, Absolute 15.77 10*3/mm3      Lymphocytes, Absolute 0.92 10*3/mm3      Monocytes, Absolute 1.54 10*3/mm3      Eosinophils, Absolute 0.00 10*3/mm3      Basophils, Absolute 0.02 10*3/mm3      Immature Grans, Absolute 0.28 10*3/mm3      nRBC 0.0  /100 WBC     POC Glucose Once [[id number]]  (Abnormal) Collected: 03/07/25 1922    Specimen: Blood Updated: 03/07/25 1933     Glucose 164 mg/dL      Comment: : [id number] Markus RogeliMeter ID: [id number]       POC Glucose Once [[id number]]  (Abnormal) Collected: 03/07/25 1645    Specimen: Blood Updated: 03/07/25 1704     Glucose 169 mg/dL      Comment: : [id number] Jensen HeatherMeter ID: [id number]       POC Glucose Once [[id number]]  (Abnormal) Collected: 03/07/25 1113    Specimen: Blood Updated: 03/07/25 1133     Glucose 143 mg/dL      Comment: : [id number] Jensen HeatherMeter ID: [id number]       POC Glucose Once [[id number]]  (Abnormal) Collected: 03/07/25 0856    Specimen: Blood Updated: 03/07/25 0907     Glucose 173 mg/dL      Comment: : [id number] Jensen HeatherMeter ID: [id number]       Tissue Pathology Exam [[id number]] Collected: 03/06/25 1025    Specimen: Tissue from Aortic Leaflet, Tissue from Aortic Aneurysm Updated: 03/07/25 0848    POC Glucose Once [[id number]]  (Abnormal) Collected: 03/07/25 0735    Specimen: Blood Updated: 03/07/25 0753     Glucose 143 mg/dL      Comment: : [id number] Jensen HeatherMeter ID: [id number]       POC Glucose Once [[id number]]  (Abnormal) Collected: 03/07/25 0603    Specimen: Blood Updated: 03/07/25 0614     Glucose 136 mg/dL      Comment: : [id number] [place] SethMeter ID: [id number]       Blood Gas, Venous - [[id number]] Collected: 03/07/25 0348    Specimen: Venous Blood Updated: 03/07/25 0351     Site OTHER     pH, Venous 7.366 pH Units      pCO2, Venous 47.4 mm Hg      pO2, Venous 29.2 mm Hg      HCO3, Venous 27.1 mmol/L      Base Excess, Venous 1.3 mmol/L      O2 Saturation, Venous 50.8 %      Temperature 37.0     Barometric Pressure for Blood Gas 752 mmHg      Modality Nasal Cannula     Flow Rate 3.0 lpm      Ventilator Mode NA     Collected by [id number]     Comment: Meter: [id number]     :  [id number]       Magnesium [[id number]]  (Normal)  Collected: 03/07/25 0243    Specimen: Blood Updated: 03/07/25 0306     Magnesium 1.8 mg/dL     Renal Function Panel [[id number]]  (Abnormal) Collected: 03/07/25 0243    Specimen: Blood Updated: 03/07/25 0306     Glucose 143 mg/dL      BUN 16 mg/dL      Creatinine 0.97 mg/dL      Sodium 140 mmol/L      Potassium 4.0 mmol/L      Chloride 106 mmol/L      CO2 23.0 mmol/L      Calcium 8.4 mg/dL      Albumin 3.6 g/dL      Phosphorus 3.0 mg/dL      Anion Gap 11.0 mmol/L      BUN/Creatinine Ratio 16.5     eGFR 62.6 mL/min/1.73     Narrative:      GFR Categories in Chronic Kidney Disease (CKD)      GFR Category          GFR (mL/min/1.73)    Interpretation  G1                     90 or greater         Normal or high (1)  G2                      60-89                Mild decrease (1)  G3a                   45-59                Mild to moderate decrease  G3b                   30-44                Moderate to severe decrease  G4                    15-29                Severe decrease  G5                    14 or less           Kidney failure          (1)In the absence of evidence of kidney disease, neither GFR category G1 or G2 fulfill the criteria for CKD.    eGFR calculation 2021 CKD-EPI creatinine equation, which does not include race as a factor    Protime-INR [[id number]]  (Abnormal) Collected: 03/07/25 0243    Specimen: Blood Updated: 03/07/25 0258     Protime 15.7 Seconds      INR 1.18    CBC & Differential [[id number]]  (Abnormal) Collected: 03/07/25 0243    Specimen: Blood Updated: 03/07/25 0255    Narrative:      The following orders were created for panel order CBC & Differential.  Procedure                               Abnormality         Status                     ---------                               -----------         ------                     CBC Auto Differential[[id number]]        Abnormal            Final result                 Please view results for these tests on the individual orders.    CBC Auto  Differential [[id number]]  (Abnormal) Collected: 03/07/25 0243    Specimen: Blood Updated: 03/07/25 0255     WBC 14.56 10*3/mm3      RBC 3.26 10*6/mm3      Hemoglobin 9.9 g/dL      Hematocrit 29.4 %      MCV 90.2 fL      MCH 30.4 pg      MCHC 33.7 g/dL      RDW 13.2 %      RDW-SD 43.6 fl      MPV 10.0 fL      Platelets 141 10*3/mm3      Neutrophil % 92.4 %      Lymphocyte % 3.0 %      Monocyte % 4.1 %      Eosinophil % 0.0 %      Basophil % 0.0 %      Immature Grans % 0.5 %      Neutrophils, Absolute 13.46 10*3/mm3      Lymphocytes, Absolute 0.44 10*3/mm3      Monocytes, Absolute 0.59 10*3/mm3      Eosinophils, Absolute 0.00 10*3/mm3      Basophils, Absolute 0.00 10*3/mm3      Immature Grans, Absolute 0.07 10*3/mm3      nRBC 0.0 /100 WBC     POC Glucose Once [[id number]]  (Abnormal) Collected: 03/07/25 0241    Specimen: Blood Updated: 03/07/25 0251     Glucose 139 mg/dL      Comment: : [id number] Khoi SethMeter ID: [id number]       POC Glucose Once [[id number]]  (Abnormal) Collected: 03/07/25 0104    Specimen: Blood Updated: 03/07/25 0115     Glucose 148 mg/dL      Comment: : [id number] [place] SethMeter ID: [id number]       Blood Gas, Arterial - [[id number]]  (Abnormal) Collected: 03/07/25 0005    Specimen: Arterial Blood Updated: 03/07/25 0010     Site Arterial Line     Alfonso's Test N/A     pH, Arterial 7.403 pH units      pCO2, Arterial 39.0 mm Hg      pO2, Arterial 78.7 mm Hg      Comment: 84 Value below reference range        HCO3, Arterial 24.3 mmol/L      Base Excess, Arterial -0.3 mmol/L      Comment: 84 Value below reference range        O2 Saturation, Arterial 96.5 %      Temperature 37.0     Barometric Pressure for Blood Gas 752 mmHg      Modality Ventilator     FIO2 30 %      Ventilator Mode PSV     PEEP 5.0     PSV 10.0 cmH2O      Collected by [id number]     Comment: Meter: [id number]     :  Mary Espinoza, SONIA        pCO2, Temperature Corrected 39.0 mm Hg      pH, Temp Corrected 7.403 pH  Units      pO2, Temperature Corrected 78.7 mm Hg      PO2/FIO2 262    Potassium [[id number]]  (Normal) Collected: 03/06/25 2243    Specimen: Blood Updated: 03/06/25 2300     Potassium 3.9 mmol/L     POC Glucose Once [[id number]]  (Abnormal) Collected: 03/06/25 2241    Specimen: Blood Updated: 03/06/25 2253     Glucose 152 mg/dL      Comment: : [id number] Khoi SethMeter ID: [id number]       Calcium, Ionized [[id number]]  (Abnormal) Collected: 03/06/25 2122    Specimen: Blood Updated: 03/06/25 2124     Ionized Calcium 4.57 mg/dL      Comment: 84 Value below reference range        Collected by [id number]     Comment: Meter: [id number]     :  Mary Espinoza RRT       Blood Gas, Venous - [[id number]] Collected: 03/06/25 2119    Specimen: Venous Blood Updated: 03/06/25 2123     Site OTHER     pH, Venous 7.350 pH Units      pCO2, Venous 48.8 mm Hg      pO2, Venous 33.3 mm Hg      HCO3, Venous 26.9 mmol/L      Base Excess, Venous 0.9 mmol/L      O2 Saturation, Venous 59.0 %      Temperature 37.0     Barometric Pressure for Blood Gas 753 mmHg      Modality Ventilator     FIO2 30 %      Flow Rate 0.0 lpm      Ventilator Mode SIMV     Set Tidal Volume 450.000     Set Mech Resp Rate 18.0     PEEP 5.0     PSV 10.0 cmH2O      Collected by [id number]     Comment: Meter: [id number]     :  Mary Espinoza RRT       POC Glucose Once [[id number]]  (Abnormal) Collected: 03/06/25 2046    Specimen: Blood Updated: 03/06/25 2058     Glucose 148 mg/dL      Comment: : [id number] [place] SethMeter ID: [id number]       POC Glucose Once [[id number]]  (Abnormal) Collected: 03/06/25 1947    Specimen: Blood Updated: 03/06/25 1959     Glucose 158 mg/dL      Comment: : [id number] [place] SethMeter ID: [id number]       Blood Gas, Arterial - [[id number]]  (Abnormal) Collected: 03/06/25 1917    Specimen: Arterial Blood Updated: 03/06/25 1920     Site Arterial Line     Alfonso's Test N/A     pH, Arterial 7.366 pH units      pCO2,  Arterial 44.1 mm Hg      pO2, Arterial 92.9 mm Hg      HCO3, Arterial 25.2 mmol/L      Base Excess, Arterial -0.3 mmol/L      Comment: 84 Value below reference range        O2 Saturation, Arterial 97.6 %      Temperature 37.0     Barometric Pressure for Blood Gas 753 mmHg      Modality Ventilator     FIO2 50 %      Ventilator Mode SIMV     Set Tidal Volume 450.000     Set Mech Resp Rate 18.0     PEEP 5.0     PSV 10.0 cmH2O      Collected by [id number]     Comment: Meter: [id number]     :  Mary Espinoza, SONIA        pCO2, Temperature Corrected 44.1 mm Hg      pH, Temp Corrected 7.366 pH Units      pO2, Temperature Corrected 92.9 mm Hg      PO2/FIO2 186    POC Glucose Once [[id number]]  (Abnormal) Collected: 03/06/25 1846    Specimen: Blood Updated: 03/06/25 1857     Glucose 162 mg/dL      Comment: : [id number] Jacqueline CadwellMeter ID: [id number]       Renal Function Panel [[id number]]  (Abnormal) Collected: 03/06/25 1801    Specimen: Blood Updated: 03/06/25 1834     Glucose 170 mg/dL      BUN 14 mg/dL      Creatinine 1.05 mg/dL      Sodium 145 mmol/L      Potassium 3.9 mmol/L      Chloride 108 mmol/L      CO2 24.0 mmol/L      Calcium 8.6 mg/dL      Albumin 3.8 g/dL      Phosphorus 2.7 mg/dL      Anion Gap 13.0 mmol/L      BUN/Creatinine Ratio 13.3     eGFR 56.9 mL/min/1.73     Narrative:      GFR Categories in Chronic Kidney Disease (CKD)      GFR Category          GFR (mL/min/1.73)    Interpretation  G1                     90 or greater         Normal or high (1)  G2                      60-89                Mild decrease (1)  G3a                   45-59                Mild to moderate decrease  G3b                   30-44                Moderate to severe decrease  G4                    15-29                Severe decrease  G5                    14 or less           Kidney failure          (1)In the absence of evidence of kidney disease, neither GFR category G1 or G2 fulfill the criteria for  CKD.    eGFR calculation 2021 CKD-EPI creatinine equation, which does not include race as a factor    CBC (No Diff) [[id number]]  (Abnormal) Collected: 03/06/25 1801    Specimen: Blood Updated: 03/06/25 1817     WBC 8.38 10*3/mm3      RBC 3.26 10*6/mm3      Hemoglobin 9.8 g/dL      Hematocrit 29.4 %      MCV 90.2 fL      MCH 30.1 pg      MCHC 33.3 g/dL      RDW 12.8 %      RDW-SD 41.9 fl      MPV 9.8 fL      Platelets 143 10*3/mm3     POC Glucose Once [[id number]]  (Abnormal) Collected: 03/06/25 1759    Specimen: Blood Updated: 03/06/25 1810     Glucose 168 mg/dL      Comment: : [id number] Jacqueline CadwellMeter ID: [id number]       POC Glucose Once [[id number]]  (Abnormal) Collected: 03/06/25 1704    Specimen: Blood Updated: 03/06/25 1715     Glucose 195 mg/dL      Comment: : [id number] Jacqueline CadwellMeter ID: [id number]       Calcium, Ionized [[id number]] Collected: 03/06/25 1636    Specimen: Blood Updated: 03/06/25 1638     Ionized Calcium 4.67 mg/dL      Collected by [id number]     Comment: Meter: [id number]     :  Carmine Zelaya CRT       Blood Gas, Arterial - [[id number]]  (Abnormal) Collected: 03/06/25 1633    Specimen: Arterial Blood Updated: 03/06/25 1637     Site Arterial Line     Alfonso's Test N/A     pH, Arterial 7.377 pH units      pCO2, Arterial 47.1 mm Hg      Comment: 83 Value above reference range        pO2, Arterial 78.5 mm Hg      Comment: 84 Value below reference range        HCO3, Arterial 27.6 mmol/L      Comment: 83 Value above reference range        Base Excess, Arterial 2.0 mmol/L      O2 Saturation, Arterial 95.6 %      Temperature 37.0     Barometric Pressure for Blood Gas 753 mmHg      Modality Ventilator     FIO2 60 %      Ventilator Mode SIMV     Set Tidal Volume 450.000     Set Mech Resp Rate 18.0     PEEP 5.0     PSV 10.0 cmH2O      Collected by [id number]     Comment: Meter: [id number]     :  Carmine Nathalie, CRT        pCO2, Temperature Corrected 47.1 mm Hg       pH, Temp Corrected 7.377 pH Units      pO2, Temperature Corrected 78.5 mm Hg      PO2/FIO2 131    POC Glucose Once [[id number]]  (Abnormal) Collected: 03/06/25 1605    Specimen: Blood Updated: 03/06/25 1616     Glucose 221 mg/dL      Comment: : [id number] Jacqueline RodriguezwellMeter ID: [id number]       CBC (No Diff) [[id number]]  (Abnormal) Collected: 03/06/25 1504    Specimen: Blood, Arterial Line Updated: 03/06/25 1525     WBC 10.03 10*3/mm3      RBC 3.41 10*6/mm3      Hemoglobin 10.3 g/dL      Hematocrit 30.7 %      MCV 90.0 fL      MCH 30.2 pg      MCHC 33.6 g/dL      RDW 12.8 %      RDW-SD 42.5 fl      MPV 9.5 fL      Platelets 143 10*3/mm3     Renal Function Panel [[id number]]  (Abnormal) Collected: 03/06/25 1419    Specimen: Blood, Arterial Line Updated: 03/06/25 1448     Glucose 155 mg/dL      BUN 13 mg/dL      Creatinine 1.04 mg/dL      Sodium 145 mmol/L      Potassium 3.5 mmol/L      Comment: Slight hemolysis detected by analyzer. Result may be falsely elevated.        Chloride 106 mmol/L      CO2 25.0 mmol/L      Calcium 9.4 mg/dL      Albumin 3.6 g/dL      Phosphorus 2.6 mg/dL      Anion Gap 14.0 mmol/L      BUN/Creatinine Ratio 12.5     eGFR 57.6 mL/min/1.73     Narrative:      GFR Categories in Chronic Kidney Disease (CKD)      GFR Category          GFR (mL/min/1.73)    Interpretation  G1                     90 or greater         Normal or high (1)  G2                      60-89                Mild decrease (1)  G3a                   45-59                Mild to moderate decrease  G3b                   30-44                Moderate to severe decrease  G4                    15-29                Severe decrease  G5                    14 or less           Kidney failure          (1)In the absence of evidence of kidney disease, neither GFR category G1 or G2 fulfill the criteria for CKD.    eGFR calculation 2021 CKD-EPI creatinine equation, which does not include race as a factor    Protime-INR [[id number]]   (Abnormal) Collected: 03/06/25 1419    Specimen: Blood, Arterial Line Updated: 03/06/25 1437     Protime 15.7 Seconds      INR 1.19    aPTT [[id number]]  (Normal) Collected: 03/06/25 1419    Specimen: Blood, Arterial Line Updated: 03/06/25 1437     PTT 32.0 seconds     Narrative:      PTT = The equivalent PTT values for the therapeutic range of heparin levels at 0.3 to 0.7 U/ml are 77 - 99 seconds.    Fibrinogen [[id number]]  (Normal) Collected: 03/06/25 1419    Specimen: Blood, Arterial Line Updated: 03/06/25 1437     Fibrinogen 351 mg/dL     Calcium, Ionized [[id number]] Collected: 03/06/25 1424    Specimen: Blood Updated: 03/06/25 1426     Ionized Calcium 4.76 mg/dL      Collected by [id number]     Comment: Meter: [id number]     :  Carmine Zelaya CRT       Blood Gas, Arterial - [[id number]]  (Abnormal) Collected: 03/06/25 1420    Specimen: Arterial Blood Updated: 03/06/25 1424     Site Arterial Line     Alfonso's Test N/A     pH, Arterial 7.401 pH units      pCO2, Arterial 42.8 mm Hg      pO2, Arterial 67.7 mm Hg      Comment: 84 Value below reference range        HCO3, Arterial 26.6 mmol/L      Comment: 83 Value above reference range        Base Excess, Arterial 1.6 mmol/L      O2 Saturation, Arterial 93.7 %      Comment: 84 Value below reference range        Temperature 35.4     Barometric Pressure for Blood Gas 753 mmHg      Modality Ventilator     FIO2 70 %      Ventilator Mode SIMV     Set Tidal Volume 450.000     Set [facility] Resp Rate 20.0     PEEP 5.0     PSV 10.0 cmH2O      Collected by [id number]     Comment: Meter: [id number]     :  Carmine Zelaya CRT        pCO2, Temperature Corrected 39.9 mm Hg      pH, Temp Corrected 7.425 pH Units      pO2, Temperature Corrected 61.0 mm Hg      PO2/FIO2 97    Arterial Blood Gas with Coox and Lactate [[id number]]  (Abnormal) Collected: 03/06/25 1255    Specimen: Arterial Blood Updated: 03/06/25 1257     pH, Arterial 7.447 pH units      pCO2, Arterial 33.9  mm Hg      Comment: 84 Value below reference range        pO2, Arterial --     HCO3, Arterial 23.3 mmol/L      Base Excess, Arterial -0.5 mmol/L      Comment: 84 Value below reference range        Hematocrit, Blood Gas 26.2 %      Comment: 84 Value below reference range        Hemoglobin, Blood Gas 8.6 g/dL      Comment: 84 Value below reference range        O2 Saturation, Arterial >100.1 %      Comment: 93 Value above reportable range > 100.1        Oxyhemoglobin 98.1 %      Carboxyhemoglobin 1.2 %      Methemoglobin 1.10 %      Sodium, Arterial 141 mmol/L      Potassium, Arterial 3.9 mmol/L      Ionized Calcium 5.19 mg/dL      A-a DO2 --     Temperature 37.0     Barometric Pressure for Blood Gas 754 mmHg      pH, Temp Corrected 7.447 pH Units      pCO2, Temperature Corrected 33.9 mm Hg      pO2, Temperature Corrected 312 mm Hg      Glucose, Arterial 161 mg/dL      Comment: 83 Value above reference range        Site Arterial Line     Alfonso's Test N/A     Modality Ventilator     FIO2 100 %      Flow Rate --     Nitric Oxide --     Ventilator Mode NA     Set Tidal Volume --     Set Mech Resp Rate --     Rate --     PEEP --     PSV --     PIP --     IPAP --     EPAP --     CPAP --     Pulse Ox --     Notified Who ADAN     Notified By [id number]     Notified Time 03/06/2025 12:56     Collected by [id number]     Comment: Meter: [id number]     :  [id number]        Note --     Lactate, whole blood 4.2 mmol/L      Comment: 86 Value above critical limit       Fibrinogen [[id number]]  (Abnormal) Collected: 03/06/25 1158    Specimen: Blood, Arterial Line Updated: 03/06/25 1240     Fibrinogen 141 mg/dL     Protime-INR [[id number]]  (Abnormal) Collected: 03/06/25 1158    Specimen: Blood, Arterial Line Updated: 03/06/25 1240     Protime 25.8 Seconds      INR 2.21    CBC (No Diff) [[id number]]  (Abnormal) Collected: 03/06/25 1158    Specimen: Blood, Arterial Line Updated: 03/06/25 1219     WBC 8.65 10*3/mm3      RBC 2.53  10*6/mm3      Hemoglobin 7.9 g/dL      Hematocrit 22.7 %      MCV 89.7 fL      MCH 31.2 pg      MCHC 34.8 g/dL      RDW 12.7 %      RDW-SD 41.1 fl      MPV 9.8 fL      Platelets 121 10*3/mm3     Arterial Blood Gas with Coox and Lactate [[id number]]  (Abnormal) Collected: 03/06/25 1201    Specimen: Arterial Blood Updated: 03/06/25 1207     pH, Arterial 7.441 pH units      pCO2, Arterial 37.1 mm Hg      pO2, Arterial --     HCO3, Arterial 25.2 mmol/L      Base Excess, Arterial 1.1 mmol/L      Hematocrit, Blood Gas 25.1 %      Comment: 84 Value below reference range        Hemoglobin, Blood Gas 8.2 g/dL      Comment: 84 Value below reference range        O2 Saturation, Arterial >100.1 %      Comment: 93 Value above reportable range > 100.1        Oxyhemoglobin 98.3 %      Carboxyhemoglobin 1.2 %      Methemoglobin 1.20 %      Sodium, Arterial 139 mmol/L      Potassium, Arterial 4.1 mmol/L      Ionized Calcium 4.99 mg/dL      A-a DO2 --     Temperature 37.0     Barometric Pressure for Blood Gas 754 mmHg      pH, Temp Corrected 7.441 pH Units      pCO2, Temperature Corrected 37.1 mm Hg      pO2, Temperature Corrected 466 mm Hg      Glucose, Arterial 171 mg/dL      Comment: 83 Value above reference range        Site Arterial Line     Alfonso's Test N/A     Modality pump     FIO2 100 %      Flow Rate 2.0 lpm      Nitric Oxide --     Ventilator Mode NA     Set Tidal Volume --     Set Mech Resp Rate --     Rate --     PEEP --     PSV --     PIP --     IPAP --     EPAP --     CPAP --     Pulse Ox --     Notified [facility] ADAN     Notified By [id number]     Notified Time 03/06/2025 12:07     Collected by [id number]     Comment: Meter: [id number]     :  [id number]        Note --     Lactate, whole blood 2.6 mmol/L      Comment: 86 Value above critical limit       Arterial Blood Gas with Coox and Lactate [[id number]]  (Abnormal) Collected: 03/06/25 1111    Specimen: Arterial Blood Updated: 03/06/25 1115     pH, Arterial 7.371 pH  units      pCO2, Arterial 45.7 mm Hg      Comment: 83 Value above reference range        pO2, Arterial --     HCO3, Arterial 26.5 mmol/L      Comment: 83 Value above reference range        Base Excess, Arterial 1.0 mmol/L      Hematocrit, Blood Gas 26.5 %      Comment: 84 Value below reference range        Hemoglobin, Blood Gas 8.6 g/dL      Comment: 84 Value below reference range        O2 Saturation, Arterial >100.1 %      Comment: 93 Value above reportable range > 100.1        Oxyhemoglobin 98.4 %      Carboxyhemoglobin 0.9 %      Methemoglobin 1.00 %      Sodium, Arterial 143 mmol/L      Potassium, Arterial 3.9 mmol/L      Ionized Calcium 4.13 mg/dL      Comment: 84 Value below reference range        A-a DO2 --     Temperature 30.0     Barometric Pressure for Blood Gas 754 mmHg      pH, Temp Corrected 7.473 pH Units      Comment: 83 Value above reference range        pCO2, Temperature Corrected 33.7 mm Hg      pO2, Temperature Corrected 386 mm Hg      Glucose, Arterial 154 mg/dL      Comment: 83 Value above reference range        Site Arterial Line     Alfonso's Test N/A     Modality pump     FIO2 70 %      Flow Rate 1.5 lpm      Nitric Oxide --     Ventilator Mode NA     Set Tidal Volume --     Set Mech Resp Rate --     Rate --     PEEP --     PSV --     PIP --     IPAP --     EPAP --     CPAP --     Pulse Ox --     Notified Who ADAN     Notified By [id number]     Notified Time 03/06/2025 11:14     Collected by [id number]     Comment: Meter: [id number]     :  [id number]        Note --     Lactate, whole blood 2.6 mmol/L      Comment: 86 Value above critical limit               Rhythm: Sinus  71 - 79    CXR: Normal postoperative changes.     Assessment & Plan   Bicuspid aortic valve with severe aortic stenosis and ascending aortic aneurysm - S/P Aortic hemiarch and ascending aortic replacement with deep hypothermic circulatory arrest, aortic valve replacement (CE Inspiris 21 mm valve) on 03/06/2025 -- patient  is doing well, now oxygenating on 2 lpm via NC. Ambulating with PT. On ASA 81 mg daily, Plavix 75 mg daily, amiodarone 400 mg BID, metoprolol to 25 mg BID. Continue lasix 20 mg IV BID with K replacement.   Essential HTN - good control    Continue with current treatment plan with ambulation and use of incentive spirometry. Will keep MCT one more day.       Beverly Zee APRN

## 2025-03-10 ENCOUNTER — READMISSION MANAGEMENT (OUTPATIENT)
Dept: CALL CENTER | Facility: HOSPITAL | Age: 70
End: 2025-03-10
Payer: MEDICARE

## 2025-03-10 VITALS
HEART RATE: 83 BPM | OXYGEN SATURATION: 95 % | SYSTOLIC BLOOD PRESSURE: 139 MMHG | RESPIRATION RATE: 16 BRPM | HEIGHT: 65 IN | WEIGHT: 151.6 LBS | BODY MASS INDEX: 25.26 KG/M2 | DIASTOLIC BLOOD PRESSURE: 73 MMHG | TEMPERATURE: 97.8 F

## 2025-03-10 LAB
ANION GAP SERPL CALCULATED.3IONS-SCNC: 10 MMOL/L (ref 5–15)
BUN SERPL-MCNC: 21 MG/DL (ref 8–23)
BUN/CREAT SERPL: 29.2 (ref 7–25)
CALCIUM SPEC-SCNC: 9.3 MG/DL (ref 8.6–10.5)
CHLORIDE SERPL-SCNC: 100 MMOL/L (ref 98–107)
CO2 SERPL-SCNC: 26 MMOL/L (ref 22–29)
CREAT SERPL-MCNC: 0.72 MG/DL (ref 0.57–1)
DEPRECATED RDW RBC AUTO: 47.6 FL (ref 37–54)
EGFRCR SERPLBLD CKD-EPI 2021: 89.5 ML/MIN/1.73
ERYTHROCYTE [DISTWIDTH] IN BLOOD BY AUTOMATED COUNT: 13.8 % (ref 12.3–15.4)
GLUCOSE BLDC GLUCOMTR-MCNC: 99 MG/DL (ref 70–130)
GLUCOSE SERPL-MCNC: 120 MG/DL (ref 65–99)
HCT VFR BLD AUTO: 35.2 % (ref 34–46.6)
HGB BLD-MCNC: 11.2 G/DL (ref 12–15.9)
MCH RBC QN AUTO: 30.2 PG (ref 26.6–33)
MCHC RBC AUTO-ENTMCNC: 31.8 G/DL (ref 31.5–35.7)
MCV RBC AUTO: 94.9 FL (ref 79–97)
PLATELET # BLD AUTO: 169 10*3/MM3 (ref 140–450)
PMV BLD AUTO: 10.6 FL (ref 6–12)
POTASSIUM SERPL-SCNC: 4.4 MMOL/L (ref 3.5–5.2)
RBC # BLD AUTO: 3.71 10*6/MM3 (ref 3.77–5.28)
SODIUM SERPL-SCNC: 136 MMOL/L (ref 136–145)
WBC NRBC COR # BLD AUTO: 12.13 10*3/MM3 (ref 3.4–10.8)

## 2025-03-10 PROCEDURE — 85027 COMPLETE CBC AUTOMATED: CPT | Performed by: NURSE PRACTITIONER

## 2025-03-10 PROCEDURE — 80048 BASIC METABOLIC PNL TOTAL CA: CPT | Performed by: NURSE PRACTITIONER

## 2025-03-10 PROCEDURE — 94799 UNLISTED PULMONARY SVC/PX: CPT

## 2025-03-10 PROCEDURE — 25010000002 ENOXAPARIN PER 10 MG: Performed by: NURSE PRACTITIONER

## 2025-03-10 PROCEDURE — 82948 REAGENT STRIP/BLOOD GLUCOSE: CPT

## 2025-03-10 PROCEDURE — 99024 POSTOP FOLLOW-UP VISIT: CPT | Performed by: THORACIC SURGERY (CARDIOTHORACIC VASCULAR SURGERY)

## 2025-03-10 PROCEDURE — 25010000002 FUROSEMIDE PER 20 MG: Performed by: NURSE PRACTITIONER

## 2025-03-10 RX ORDER — OXYCODONE AND ACETAMINOPHEN 5; 325 MG/1; MG/1
1 TABLET ORAL EVERY 6 HOURS PRN
Qty: 5 TABLET | Refills: 0 | Status: SHIPPED | OUTPATIENT
Start: 2025-03-10

## 2025-03-10 RX ORDER — METOPROLOL TARTRATE 25 MG/1
25 TABLET, FILM COATED ORAL 2 TIMES DAILY
Qty: 60 TABLET | Refills: 2 | Status: SHIPPED | OUTPATIENT
Start: 2025-03-10

## 2025-03-10 RX ORDER — POTASSIUM CHLORIDE 750 MG/1
20 CAPSULE, EXTENDED RELEASE ORAL DAILY
Qty: 14 CAPSULE | Refills: 0 | Status: SHIPPED | OUTPATIENT
Start: 2025-03-10 | End: 2025-03-17

## 2025-03-10 RX ORDER — FUROSEMIDE 40 MG/1
40 TABLET ORAL DAILY
Qty: 7 TABLET | Refills: 0 | Status: SHIPPED | OUTPATIENT
Start: 2025-03-10 | End: 2025-03-17

## 2025-03-10 RX ORDER — CLOPIDOGREL BISULFATE 75 MG/1
75 TABLET ORAL DAILY
Qty: 30 TABLET | Refills: 2 | Status: SHIPPED | OUTPATIENT
Start: 2025-03-10

## 2025-03-10 RX ADMIN — PREGABALIN 25 MG: 25 CAPSULE ORAL at 03:17

## 2025-03-10 RX ADMIN — AMIODARONE HYDROCHLORIDE 400 MG: 200 TABLET ORAL at 08:43

## 2025-03-10 RX ADMIN — Medication 1 APPLICATION: at 05:41

## 2025-03-10 RX ADMIN — PANTOPRAZOLE SODIUM 40 MG: 40 TABLET, DELAYED RELEASE ORAL at 05:41

## 2025-03-10 RX ADMIN — POTASSIUM CHLORIDE 20 MEQ: 750 CAPSULE, EXTENDED RELEASE ORAL at 08:43

## 2025-03-10 RX ADMIN — FUROSEMIDE 20 MG: 10 INJECTION, SOLUTION INTRAMUSCULAR; INTRAVENOUS at 08:43

## 2025-03-10 RX ADMIN — OXYCODONE HYDROCHLORIDE 5 MG: 5 TABLET ORAL at 02:03

## 2025-03-10 RX ADMIN — ENOXAPARIN SODIUM 40 MG: 100 INJECTION SUBCUTANEOUS at 08:43

## 2025-03-10 RX ADMIN — ASPIRIN 81 MG: 81 TABLET, COATED ORAL at 08:43

## 2025-03-10 RX ADMIN — IPRATROPIUM BROMIDE AND ALBUTEROL SULFATE 1.5 ML: .5; 3 SOLUTION RESPIRATORY (INHALATION) at 07:00

## 2025-03-10 RX ADMIN — METOPROLOL TARTRATE 25 MG: 25 TABLET, FILM COATED ORAL at 08:43

## 2025-03-10 NOTE — DISCHARGE SUMMARY
[facility] Cardiothoracic Surgery  DISCHARGE SUMMARY        Date of Admission: 3/6/2025  Date of Discharge:  3/10/2025  Primary Care Physician: Provider, No Known    Discharge Diagnoses:  Active Hospital Problems    Diagnosis     **Aortic stenosis, severe     Aneurysm of ascending aorta without rupture     Hypertension        Procedures Performed: Aortic hemiarch and ascending aortic replacement with deep hypothermic circulatory arrest, aortic valve replacement (CE Inspiris 21 mm valve), Left atrial appendage exclusion with AtriCure AtriClip 35mm device, Ultrasound guided insertion of right femoral arterial line, Application of Prevena Incision management system performed on March 6, 2025 by Dr. Clark.    HPI:  Ms. Mayr Salazar is a 69 y.o. female who has been followed for severe aortic valve stenosis.  She follows with Dr. Randhawa from the cardiology and structural heart services.  Additionally, she was found to have an ascending aortic aneurysm and has been recommended to undergo aortic valve replacement with ascending and hemiarch aortic replacement with deep hypothermic circulatory arrest.  She reports experiencing dyspnea, ankle edema, and mild fatigue which have worsened over the last year.  She is a non-smoker.  She has family history of longevity including several family members living to be 90 years of age.  Preoperative testing was obtained and she was deemed an acceptable risk candidate proceed forward.  She is agreeable to proceed forward.    Hospital Course: Ms. Salazar was admitted on the morning of surgery on March 6, 2025.  Please see a separate op note by Dr. Clark.  Following surgery, she was transferred to the intensive care unit in stable guarded condition.  She remained hemodynamically stable.  She was extubated and demonstrated a neurologically intact exam.  On postop day 1, she was up to the chair and walking with physical therapy.  She was given multimodality pain control  strategies.  She was transferred to  and the remaining stay for hospitalization was remarkable for diuresis, weaning supplemental oxygen, and encouraging pulmonary toilet and ambulation.  Mediastinal chest tubes were removed on postop day 4. Pacing wires were removed on postop day 4. She meets criteria to discharge home on postop day 4 and is agreeable to do so with family.  She is walking over 200 feet with physical therapy.  She is saturating appropriately on room air.  She has good pain control.    Condition on Discharge:  Neurologically intact and has good pain control.  She is eating well and has demonstrable good bowel function.  The sternum is stable without clicks.  Prevena in place over sternal incision. The heart is in normal sinus rhythm.  She has met all physical therapy criteria and verbalizes understanding of sternotomy precautions.   She verbalizes understanding of a separately handed out cardiac surgery handout.       Discharge Disposition:  Home or Self Care [1]    Discharge Medications:     Discharge Medications        New Medications        Instructions Start Date   clopidogrel 75 MG tablet  Commonly known as: PLAVIX   75 mg, Oral, Daily      furosemide 40 MG tablet  Commonly known as: Lasix   40 mg, Oral, Daily      metoprolol tartrate 25 MG tablet  Commonly known as: LOPRESSOR   25 mg, Oral, 2 Times Daily      oxyCODONE-acetaminophen 5-325 MG per tablet  Commonly known as: Percocet   1 tablet, Oral, Every 6 Hours PRN      potassium chloride 10 MEQ CR capsule  Commonly known as: MICRO-K   20 mEq, Oral, Daily             Continue These Medications        Instructions Start Date   ALPRAZolam 0.25 MG tablet  Commonly known as: XANAX   0.125-0.25 mg, Nightly PRN      ascorbic acid 500 MG tablet  Commonly known as: VITAMIN C   500 mg, Oral, Daily      aspirin 81 MG EC tablet   81 mg, Oral, Daily      CALCIUM PO   1 tablet, Oral, Daily      lansoprazole 15 MG capsule  Commonly known as: PREVACID   15  mg, Oral, Daily      losartan 50 MG tablet  Commonly known as: Cozaar   50 mg, Oral, Daily      multivitamin with minerals tablet tablet   1 tablet, Oral, Daily      Omega-3 Fish Oil 1000 MG capsule   1 capsule, Oral, Daily      PROBIOTIC ADVANCED PO   1 capsule, Oral, Daily      VITAMIN B + C COMPLEX PO   1 tablet, Oral, Daily      ZINC PO   1 tablet, Oral, Daily             Stop These Medications      bisoprolol-hydrochlorothiazide 5-6.25 MG per tablet  Commonly known as: ZIAC          No indication for statin use    Discharge Diet: Regular diet       Discharge Care Plan / Instructions: Please see the separately handed out cardiac surgery handout. Cardiac rehab deferred at this time due to sternotomy precautions-will reassess at formal office visit.     Activity at Discharge:   No heavy lifting greater than 5 pounds or a gallon of milk while maintaining sternal precautions.  Mary Salazar has been instructed on an exercise  regimen as detailed in a handed out cardiac surgery handout.    Tobacco:  The patient does not use tobacco products and therefore does not need tobacco cessation education.    BMI: BMI is >= 25 and <30. (Overweight) The following options were offered after discussion;: weight loss educational material (shared in after visit summary).     Follow-up Appointments: Mary Salazar  is requested to see Provider, No Known within 1-2 weeks from time of discharge, to see Renee MERCHANT on 3/13/2024, to follow-up with Dr. Clark in 4 weeks,  and to follow-up with Dr. Randhawa of the cardiology service in 6 weeks.

## 2025-03-10 NOTE — CASE MANAGEMENT/SOCIAL WORK
Continued Stay Note  TYLER Rodas     Patient Name: Mary Salazar  MRN: [id number]  Today's Date: 3/10/2025    Admit Date: 3/6/2025    Plan: Home   Discharge Plan       Row Name 03/10/25 1025       Plan    Plan Home    Patient/Family in Agreement with Plan yes    Plan Comments Chart reviewed; events noted. Pt plans home; no dc needs identified.    Final Discharge Disposition Code 01 - home or self-care                   Discharge Codes    No documentation.                       KSENIA Parham

## 2025-03-10 NOTE — THERAPY DISCHARGE NOTE
Acute Care - Physical Therapy Discharge Summary  [facility]       Patient Name: Mary Salazar  : 1955  MRN: [id number]    Today's Date: 3/10/2025                 Admit Date: 3/6/2025      PT Recommendation and Plan    Visit Dx:    ICD-10-CM ICD-9-CM   1. S/P AVR (aortic valve replacement)  Z95.2 V43.3   2. Aortic stenosis, severe  I35.0 424.1   3. Aneurysm of ascending aorta without rupture  I71.21 441.2   4. Impaired mobility [Z74.09]  Z74.09 799.89        Outcome Measures       Row Name 25 1200 25 0800          How much help from another person do you currently need...    Turning from your back to your side while in flat bed without using bedrails? 3  -MEGAN 2  -MEGAN     Moving from lying on back to sitting on the side of a flat bed without bedrails? 3  -MEGAN 2  -MEGAN     Moving to and from a bed to a chair (including a wheelchair)? 4  -MEGAN 3  -MEGAN     Standing up from a chair using your arms (e.g., wheelchair, bedside chair)? 4  no arms  -MEGAN 3  -MEGAN     Climbing 3-5 steps with a railing? 3  -MEGAN 3  -MEGAN     To walk in hospital room? 3  -MEGAN 3  -MEGAN     AM-PAC 6 Clicks Score (PT) 20  -MEGAN 16  -MEGAN               User Key  (r) = Recorded By, (t) = Taken By, (c) = Cosigned By      Initials Name Provider Type    Kim Guerrero, PTA Physical Therapist Assistant                         PT Rehab Goals       Row Name 03/10/25 1400             Bed Mobility Goal 1 (PT)    Activity/Assistive Device (Bed Mobility Goal 1, PT) sit to supine/supine to sit  -AB      [place] Level/Cues Needed (Bed Mobility Goal 1, PT) supervision required  -AB      Time Frame (Bed Mobility Goal 1, PT) long term goal (LTG);10 days  -AB      Progress/Outcomes (Bed Mobility Goal 1, PT) goal not met  -AB         Transfer Goal 1 (PT)    Activity/Assistive Device (Transfer Goal 1, PT) sit-to-stand/stand-to-sit;bed-to-chair/chair-to-bed  -AB      [place] Level/Cues Needed (Transfer Goal 1, PT) supervision required  -AB      Time Frame  (Transfer Goal 1, PT) long term goal (LTG);10 days  -AB      Progress/Outcome (Transfer Goal 1, PT) goal not met  -AB         Gait Training Goal 1 (PT)    Activity/Assistive Device (Gait Training Goal 1, PT) gait (walking locomotion);decrease fall risk;improve balance and speed;increase endurance/gait distance  -AB      [place] Level (Gait Training Goal 1, PT) supervision required  -AB      Distance (Gait Training Goal 1, PT) 200 ft  -AB      Time Frame (Gait Training Goal 1, PT) long term goal (LTG);10 days  -AB      Progress/Outcome (Gait Training Goal 1, PT) goal not met  -AB                User Key  (r) = Recorded By, (t) = Taken By, (c) = Cosigned By      Initials Name Provider Type Discipline    Karen Hayes, PTA Physical Therapist Assistant PT                        PT Discharge Summary  Anticipated Discharge Disposition (PT): home with 24/7 care, home with home health, sub acute care setting, skilled nursing facility  Reason for Discharge: Discharge from facility  Outcomes Achieved: Refer to plan of care for updates on goals achieved  Discharge Destination: Home with assist      Karen Nash PTA   3/10/2025

## 2025-03-10 NOTE — PROGRESS NOTES
"Patient name: Mary Salazar  Patient : 1955  VISIT # [id number]  MR #[id number]    Procedure:Procedure(s):  AORTIC VALVE REPLACEMENT (CE INSPIRIS 21MM VALVE), ASCENDING AORTIC ANEURYSM REPAIR, HEMIARCH AORTIC RESECTION WITH DEEP HYPOTHERMIC CIRCULATORY ARREST, ATRIAL APPENDAGE EXCLUSION LEFT 35MM, APPLICATION PREVENA, ULTRASOUND GUIDED FEMORAL ARTERIAL LINE INSERTION, TRANSESOPHAGEAL ECHOCARDIOGRAM  AORTIC VALVE ASCENDING ANEURYSM REPAIR  Procedure Date:3/6/2025  POD:4 Days Post-Op    Subjective   Up in the chair.  On room air.  Good pain control.  Her bowels have moved.  She is walking 300 feet with physical therapy.  Weight is down 6 pounds in the last 24 hours and she is 2 pounds above baseline weight.    Telemetry: Sinus 70-80s       Objective     Visit Vitals  /68 (BP Location: Right arm, Patient Position: Sitting)   Pulse 83   Temp 97.8 °F (36.6 °C) (Oral)   Resp 16   Ht 165.1 cm (65\")   Wt 68.8 kg (151 lb 9.6 oz)   SpO2 95%   BMI 25.23 kg/m²   Baseline weight: 149 pounds    Intake/Output Summary (Last 24 hours) at 3/10/2025 0859  Last data filed at 3/10/2025 0851  Gross per 24 hour   Intake 420 ml   Output 2980 ml   Net -2560 ml     Mediastinal chest tube: 130 mL / 24 hours, serosanguineous fluid, no airleak    Lab:     CBC:  Results from last 7 days   Lab Units 03/10/25  0315 03/09/25  0410 25  0431   WBC 10*3/mm3 12.13* 16.99* 18.53*   HEMATOCRIT % 35.2 31.4* 27.5*   PLATELETS 10*3/mm3 169 157 131*          BMP:  Results from last 7 days   Lab Units 03/10/25  0315 03/09/25  0410 25  0431   SODIUM mmol/L 136 140 137   POTASSIUM mmol/L 4.4 4.6 4.7   CHLORIDE mmol/L 100 103 103   CO2 mmol/L 26.0 26.0 24.0   GLUCOSE mg/dL 120* 124* 145*   BUN mg/dL 21 23 22   CREATININE mg/dL 0.72 0.76 0.92          COAG:  Results from last 7 days   Lab Units 25  0243 25  1419   INR  1.18* 1.19*   APTT seconds  --  32.0       IMAGES:       Imaging Results (Last 24 Hours)       Procedure " Component Value Units Date/Time    XR Chest PA & Lateral [[id number]] Collected: 03/09/25 1434     Updated: 03/09/25 1437    Narrative:      XR CHEST PA AND LATERAL-     HISTORY: Post CABG; Z74.09-Other reduced mobility; I35.0-Nonrheumatic  aortic (valve) stenosis; I71.21-Aneurysm of the ascending aorta, without  rupture     COMPARISON: 3/20/2025     FINDINGS: Frontal view the chest obtained.     Right IJ sheath is been removed. Median sternotomy wires with retained  mediastinal drains. Prosthetic coronary valve and left atrial appendage  clip. Similar cardiomegaly. Stable basilar atelectasis. Small pleural  effusion. No pneumothorax.       Impression:      1. Removal of right IJ sheath, otherwise stable 1 day view of the chest.        This report was signed and finalized on 3/9/2025 2:34 PM by Dr. Katy Howe MD.                 Physical Exam:  General: Alert, oriented. No apparent distress. Up in the chair.   Cardiovascular: Regular rate and rhythm without murmur, rubs, or gallops.    Pulmonary: Clear to auscultation bilaterally without wheezing, rubs, or rales.  Chest: Sternotomy incision clean, dry, and intact with prevena. Sternum stable. No clicks.  MCT in place, no air leak, serosanguinous fluid.   Abdomen: Soft, non distended, and non tender.  Extremities: Warm, moves all extremities. Peripheral edema.   Neurologic:  Grossly intact with no focal deficits.            Impression:  Ascending aortic aneurysm  Aortic valve stenosis  Hypertension      Plan:  Plan for removal of pacing wires and then mediastinal chest tubes 30 minutes after when MD available-will call with timing. Keep Prevena in place.  Plan for discharge home later today.  She is agreeable to [place].  She wishes to use [facility] in [place], [place].  Encourage pulmonary toilet and ambulation  Routine postcardiac surgery regimen  Discussed with patient and nursing        SHONDA Lawrence  03/10/25  08:59 CDT

## 2025-03-10 NOTE — PLAN OF CARE
Problem: Adult Inpatient Plan of Care  Goal: Plan of Care Review  Outcome: Progressing  Flowsheets (Taken 3/10/2025 9162)  Progress: no change  Outcome Evaluation: VSS. pt A&Ox4. pt on room air. pt c/o of back pain and incisional pain, prn pain medication given. prevena and chest tube in place. Walk completed this morning, pt walked to 4C and back to room without any breaks. 40ml of output from chest tube this shift. Sinus 73-90 on tele. safety maintained.  Plan of Care Reviewed With: patient

## 2025-03-10 NOTE — OUTREACH NOTE
Prep Survey      Flowsheet Row Responses   Scient[facility] facility patient discharged from? [place]   Is LACE score < 7 ? No   Eligibility Readm Mgmt   Discharge diagnosis Aortic stenosis, severe   Does the patient have one of the following disease processes/diagnoses(primary or secondary)? Other   Prep survey completed? Yes            Renee ASIF - Registered Nurse

## 2025-03-12 LAB
CYTO UR: NORMAL
LAB AP CASE REPORT: NORMAL
LAB AP SPECIAL STAINS: NORMAL
Lab: NORMAL
PATH REPORT.FINAL DX SPEC: NORMAL
PATH REPORT.GROSS SPEC: NORMAL

## 2025-03-13 ENCOUNTER — OFFICE VISIT (OUTPATIENT)
Dept: CARDIAC SURGERY | Facility: CLINIC | Age: 70
End: 2025-03-13
Payer: MEDICARE

## 2025-03-13 VITALS
SYSTOLIC BLOOD PRESSURE: 136 MMHG | WEIGHT: 145.4 LBS | BODY MASS INDEX: 24.22 KG/M2 | DIASTOLIC BLOOD PRESSURE: 83 MMHG | HEIGHT: 65 IN | HEART RATE: 88 BPM | OXYGEN SATURATION: 98 %

## 2025-03-13 DIAGNOSIS — I35.0 AORTIC STENOSIS, SEVERE: ICD-10-CM

## 2025-03-13 DIAGNOSIS — I10 PRIMARY HYPERTENSION: ICD-10-CM

## 2025-03-13 DIAGNOSIS — I71.21 ANEURYSM OF ASCENDING AORTA WITHOUT RUPTURE: Primary | ICD-10-CM

## 2025-03-13 NOTE — PROGRESS NOTES
Subjective   Chief Complaint   Patient presents with    Post-op Follow-up     Patient had AVR on 3/6. Prevena removal today.        Patient ID: Mary Salazar is a 69 y.o. female who is here for follow-up having had Aortic hemiarch and ascending aortic replacement with deep hypothermic circulatory arrest, aortic valve replacement (CE Inspiris 21 mm valve), Left atrial appendage exclusion with AtriCure AtriClip 35mm device, Ultrasound guided insertion of right femoral arterial line, Application of Prevena Incision management system performed on March 6, 2025 by Dr. Clark.       Post-op Follow-up    Post operative recovery was uneventful without any major complications.  She was discharged home on postoperative day 4.  She returns today for removal of Prevena an follow-up visit.  She is joined by her spouse. Sleep habits are fair-she is having to sleep on her back and in her recliner. Pain control has been great-she has only taken half a pain pill since discharge home. No fevers/sweats/chills. No drainage from incisions.  No sternal clicks. No chest pain or shortness of breath. Appetite is fair and is improving. Ambulating at least 5-7 minutes twice daily.  She does have dry mouth and a sore throat but she believes this is better today.  Weight is down 6 pounds since discharge home.  She is below baseline weight.    The following portions of the patient's history were reviewed and updated as appropriate: allergies, current medications, past family history, past medical history, past social history, past surgical history and problem list.    Review of Systems   Constitutional:  Negative for chills, diaphoresis and fever.   HENT:  Positive for sore throat (slight and dry mouth). Negative for trouble swallowing and voice change.    Respiratory:  Negative for cough, shortness of breath and wheezing.    Cardiovascular:  Positive for chest pain (incisional). Negative for palpitations and leg swelling.       Objective   Visit  "Vitals  /83   Pulse 88   Ht 165.1 cm (65\")   Wt 66 kg (145 lb 6.4 oz)   SpO2 98%   BMI 24.20 kg/m²       Physical Exam  Vitals reviewed.   Constitutional:       General: She is not in acute distress.     Appearance: Normal appearance. She is well-developed. She is not diaphoretic.   HENT:      Head: Normocephalic.   Eyes:      Pupils: Pupils are equal, round, and reactive to light.   Neck:      Vascular: No JVD.   Cardiovascular:      Rate and Rhythm: Normal rate and regular rhythm.      Heart sounds: Normal heart sounds.      No friction rub.   Pulmonary:      Effort: Pulmonary effort is normal. No respiratory distress.      Breath sounds: Normal breath sounds. No stridor. No wheezing or rales.   Abdominal:      General: There is no distension.      Palpations: Abdomen is soft.      Tenderness: There is no abdominal tenderness.   Musculoskeletal:      Right lower leg: No edema.      Left lower leg: No edema.   Skin:     General: Skin is warm and dry.      Coloration: Skin is not pale.      Findings: No erythema or rash.      Comments: Prevena removed without remark.  CHG prep sponge applied over sternal incision and Steri-Strips applied per routine.  No drainage or evidence of infection.  No clicks. Sternum stable.  Scabs overlying previous mediastinal chest tube site.  She does have abrasions and scabs on bilateral sides of chest from external cardiac monitoring pads.     Neurological:      Mental Status: She is alert and oriented to person, place, and time.      Cranial Nerves: No cranial nerve deficit.   Psychiatric:         Behavior: Behavior normal.         Assessment & Plan       Diagnoses and all orders for this visit:    1. Aneurysm of ascending aorta without rupture (Primary)    2. Aortic stenosis, severe    3. Primary hypertension           Overall, Mary Salazar is doing well.  Prevena removed without remark. Surgical incisions are clean and dry without evidence of infection. We discussed current " sternotomy precautions and how these will not be advanced yet. Continue post op surgical wound care: shower daily with antibacterial soap and water, avoid use of lotions, creams, ointments, powders etc, allow steri strips to fall off on their own. Following post op cardiac surgery home instructions.     Medication reconciliation performed.  Advised to discontinue Lasix and potassium starting tomorrow as she is already taken doses today.  Weight is down 6 pounds since discharge on Monday and she diuresed 6 pounds in the last 24 hours prior to discharge home.  She is 4 pounds below baseline weight.  She will closely monitor for lower extremity edema and daily weights; call if needed.    Provided support and encouragement. All questions have been answered to the best of my ability. Will discuss starting cardiac rehabilitation at official 1 month post op visit. Patient has follow up with Dr. Clark in a few weeks. Patient has follow up with Cardiology in a few weeks. Patient has been instructed to contact our office with any questions or concerns should they arise prior to the next office visit.     BMI is >= 25 and <30. (Overweight) The following options were offered after discussion;: weight loss educational material (shared in after visit summary).        Mary VALENZUELA Martin  reports that she has never smoked. She has been exposed to tobacco smoke. She has never used smokeless tobacco.          Advance Care Planning   ACP discussion was declined by the patient. Patient does not have an advance directive, declines further assistance.

## 2025-03-16 LAB
QT INTERVAL: 374 MS
QTC INTERVAL: 426 MS

## 2025-03-17 ENCOUNTER — TELEPHONE (OUTPATIENT)
Dept: CARDIAC SURGERY | Facility: CLINIC | Age: 70
End: 2025-03-17
Payer: MEDICARE

## 2025-03-17 NOTE — TELEPHONE ENCOUNTER
Pt informed as directed and voiced understanding.  Pt has another question now.  States she is having a very dry mouth and she is wondering if this could be from the Lopressor.  Advised pt I would send this back to ask and call her back tomorrow on this/kahm

## 2025-03-17 NOTE — TELEPHONE ENCOUNTER
Reviewed with Dr. Clark, he is okay with these medication changes given review of her PCP note with vital signs.  This should now put her at Lopressor 50 mg twice daily and losartan 25 mg daily.  Please relay to her

## 2025-03-17 NOTE — TELEPHONE ENCOUNTER
She mentioned dry mouth at visit last week. We stopped diuretics as she had dropped several pounds and was euvolemic. According to [employer]scape there is a 1% incidence of xerostomia or dry mouth from lopressor but it is a side effect of general anesthesia too. I would advise her to continue good oral hydration at this time and if it doesn't improve then speak to PCP.

## 2025-03-17 NOTE — TELEPHONE ENCOUNTER
Pt calling requesting to speak with Renee MERCHANT re: meds.  States she saw her PCP after d/c as directed and PCP wants to change her meds due to her heart rate.  They want to increase her Metoprolol to 100mg qd and decrease her Losartin to 50mg QD.  Pt does not want to do this until she can speak with Renee re: this.  Can reach her at #[phone]/christal

## 2025-03-18 ENCOUNTER — TELEPHONE (OUTPATIENT)
Dept: CARDIAC SURGERY | Facility: CLINIC | Age: 70
End: 2025-03-18
Payer: MEDICARE

## 2025-03-18 NOTE — TELEPHONE ENCOUNTER
Called and discussed with patient.  Reports she was seen by her PCP yesterday and a CBC was obtained to follow-up a WBC of 12 at time of discharge.  It looks like a differential was ordered as well although we do not have results available for review at this time.  Patient reports she has been doing great, denies any drainage or changes to her surgical sites.  No fevers, sweats, or chills.  No viral illnesses. She lives an hour away.  At this time, I will review labs once they have arrived and then I will reach out to patient-we discussed safest thing to do is to have her come to the office for a follow-up at some point but I would like to review her labs.  Reports her schedule is pretty wide open and we would call her later.  She is good with this plan.  Let me know when labs received. Thanks.

## 2025-03-18 NOTE — TELEPHONE ENCOUNTER
Pt calling back.  States her PCP office called her to report her bloodwork done with them revealed a white count higher than her hosp readings.  Pt reports they told her it was now 14.4 and directed her to call our office to see if she needs abx.  Asked pt if their office was planning to fax us the results and she did not know.  Advised pt I would send a message re: this and I would call PCP office to request labs to be faxed.  Called office for Hazel MERCHANT and left  for her medical asst requesting these labs to be faxed to our office and provided fax#  in message.  Can reach pt at #[phone]/christal

## 2025-03-19 DIAGNOSIS — D72.829 LEUKOCYTOSIS, UNSPECIFIED TYPE: Primary | ICD-10-CM

## 2025-03-19 NOTE — TELEPHONE ENCOUNTER
Called pt and appt sched for Renee MERCHANT for 3-20-25 at 10:30.  Pt informed to arrive at main reg 30min early to have testing done first.  Pt aware urine test will be part of the labwork/kahm

## 2025-03-19 NOTE — TELEPHONE ENCOUNTER
Reviewed and discussed with Dr. Clark. He wants patient seen with labs, CXR and Urinalysis. I will order, please place on my schedule (okay to be seen tomorrow midmorning if she can make this)

## 2025-03-20 ENCOUNTER — READMISSION MANAGEMENT (OUTPATIENT)
Dept: CALL CENTER | Facility: HOSPITAL | Age: 70
End: 2025-03-20
Payer: MEDICARE

## 2025-03-20 ENCOUNTER — HOSPITAL ENCOUNTER (OUTPATIENT)
Dept: GENERAL RADIOLOGY | Facility: HOSPITAL | Age: 70
Discharge: HOME | End: 2025-03-20
Payer: MEDICARE

## 2025-03-20 ENCOUNTER — LAB (OUTPATIENT)
Dept: LAB | Facility: HOSPITAL | Age: 70
End: 2025-03-20
Payer: MEDICARE

## 2025-03-20 ENCOUNTER — OFFICE VISIT (OUTPATIENT)
Dept: CARDIAC SURGERY | Facility: CLINIC | Age: 70
End: 2025-03-20
Payer: MEDICARE

## 2025-03-20 VITALS
BODY MASS INDEX: 24.07 KG/M2 | HEART RATE: 80 BPM | OXYGEN SATURATION: 95 % | SYSTOLIC BLOOD PRESSURE: 120 MMHG | DIASTOLIC BLOOD PRESSURE: 70 MMHG | HEIGHT: 65 IN | WEIGHT: 144.5 LBS

## 2025-03-20 DIAGNOSIS — I35.0 AORTIC STENOSIS, SEVERE: ICD-10-CM

## 2025-03-20 DIAGNOSIS — I10 PRIMARY HYPERTENSION: ICD-10-CM

## 2025-03-20 DIAGNOSIS — D72.829 LEUKOCYTOSIS, UNSPECIFIED TYPE: ICD-10-CM

## 2025-03-20 DIAGNOSIS — I71.21 ANEURYSM OF ASCENDING AORTA WITHOUT RUPTURE: Primary | ICD-10-CM

## 2025-03-20 LAB
BASOPHILS # BLD AUTO: 0.06 10*3/MM3 (ref 0–0.2)
BASOPHILS NFR BLD AUTO: 0.6 % (ref 0–1.5)
BILIRUB UR QL STRIP: NEGATIVE
CLARITY UR: CLEAR
COLOR UR: ABNORMAL
DEPRECATED RDW RBC AUTO: 46.4 FL (ref 37–54)
EOSINOPHIL # BLD AUTO: 0.15 10*3/MM3 (ref 0–0.4)
EOSINOPHIL NFR BLD AUTO: 1.5 % (ref 0.3–6.2)
ERYTHROCYTE [DISTWIDTH] IN BLOOD BY AUTOMATED COUNT: 13.5 % (ref 12.3–15.4)
GLUCOSE UR STRIP-MCNC: NEGATIVE MG/DL
HCT VFR BLD AUTO: 34.5 % (ref 34–46.6)
HGB BLD-MCNC: 11 G/DL (ref 12–15.9)
HGB UR QL STRIP.AUTO: NEGATIVE
IMM GRANULOCYTES # BLD AUTO: 0.07 10*3/MM3 (ref 0–0.05)
IMM GRANULOCYTES NFR BLD AUTO: 0.7 % (ref 0–0.5)
KETONES UR QL STRIP: ABNORMAL
LEUKOCYTE ESTERASE UR QL STRIP.AUTO: NEGATIVE
LYMPHOCYTES # BLD AUTO: 1.65 10*3/MM3 (ref 0.7–3.1)
LYMPHOCYTES NFR BLD AUTO: 16.7 % (ref 19.6–45.3)
MCH RBC QN AUTO: 29.9 PG (ref 26.6–33)
MCHC RBC AUTO-ENTMCNC: 31.9 G/DL (ref 31.5–35.7)
MCV RBC AUTO: 93.8 FL (ref 79–97)
MONOCYTES # BLD AUTO: 0.95 10*3/MM3 (ref 0.1–0.9)
MONOCYTES NFR BLD AUTO: 9.6 % (ref 5–12)
NEUTROPHILS NFR BLD AUTO: 6.99 10*3/MM3 (ref 1.7–7)
NEUTROPHILS NFR BLD AUTO: 70.9 % (ref 42.7–76)
NITRITE UR QL STRIP: NEGATIVE
NRBC BLD AUTO-RTO: 0 /100 WBC (ref 0–0.2)
PH UR STRIP.AUTO: 5.5 [PH] (ref 5–8)
PLATELET # BLD AUTO: 350 10*3/MM3 (ref 140–450)
PMV BLD AUTO: 9.3 FL (ref 6–12)
PROT UR QL STRIP: NEGATIVE
RBC # BLD AUTO: 3.68 10*6/MM3 (ref 3.77–5.28)
SP GR UR STRIP: 1.02 (ref 1–1.03)
UROBILINOGEN UR QL STRIP: ABNORMAL
WBC NRBC COR # BLD AUTO: 9.87 10*3/MM3 (ref 3.4–10.8)

## 2025-03-20 PROCEDURE — 36415 COLL VENOUS BLD VENIPUNCTURE: CPT

## 2025-03-20 PROCEDURE — 99024 POSTOP FOLLOW-UP VISIT: CPT | Performed by: NURSE PRACTITIONER

## 2025-03-20 PROCEDURE — 1160F RVW MEDS BY RX/DR IN RCRD: CPT | Performed by: NURSE PRACTITIONER

## 2025-03-20 PROCEDURE — 71046 X-RAY EXAM CHEST 2 VIEWS: CPT

## 2025-03-20 PROCEDURE — 3078F DIAST BP <80 MM HG: CPT | Performed by: NURSE PRACTITIONER

## 2025-03-20 PROCEDURE — 3074F SYST BP LT 130 MM HG: CPT | Performed by: NURSE PRACTITIONER

## 2025-03-20 PROCEDURE — 1159F MED LIST DOCD IN RCRD: CPT | Performed by: NURSE PRACTITIONER

## 2025-03-20 PROCEDURE — 81003 URINALYSIS AUTO W/O SCOPE: CPT

## 2025-03-20 PROCEDURE — 85025 COMPLETE CBC W/AUTO DIFF WBC: CPT

## 2025-03-20 NOTE — OUTREACH NOTE
Medical Week 2 Survey      Flowsheet Row Responses   [facility] patient discharged from? [place]   Does the patient have one of the following disease processes/diagnoses(primary or secondary)? Other   Week 2 attempt successful? Yes   Call start time 1222   Discharge diagnosis Aortic stenosis, severe   Call end time 1223   Person spoke with today (if not patient) and relationship Spouse- Víctor   Meds reviewed with patient/caregiver? Yes   Does the patient have all medications ordered at discharge? Yes   Prescription comments START taking:  clopidogrel (PLAVIX)  furosemide (Lasix)  metoprolol tartrate (LOPRESSOR)  oxyCODONE-acetaminophen (Percocet)  potassium chloride (MICRO-K)  STOP taking:  bisoprolol-hydrochlorothiazide 5-6.25 MG per  tablet (ZIAC)   Is the patient taking all medications as directed (includes completed medication regime)? Yes   Does the patient have a primary care provider?  Yes   Comments regarding PCP Has seen her physician/specialty   Has home health visited the patient within 72 hours of discharge? N/A   Psychosocial issues? No   Did the patient receive a copy of their discharge instructions? Yes   Nursing interventions Reviewed instructions with patient   What is the patient's perception of their health status since discharge? Improving   Is the patient/caregiver able to teach back signs and symptoms related to disease process for when to call PCP? Yes   Is the patient/caregiver able to teach back signs and symptoms related to disease process for when to call 911? Yes   Is the patient/caregiver able to teach back the hierarchy of who to call/visit for symptoms/problems? PCP, Specialist, Home health nurse, Urgent Care, ED, 911 Yes   Week 2 Call Completed? Yes   Graduated Yes   Wrap up additional comments Spouse states patient is doing well. No concerns or questions noted.   Call end time 1223            Renee ASIF - Registered Nurse

## 2025-03-20 NOTE — PROGRESS NOTES
Subjective   Chief Complaint   Patient presents with    Post-op Follow-up     Patient had AVR on 3/6. Labs/CXR today.       Patient ID: Mary Salazar is a 69 y.o. female who is here for follow-up having had Aortic hemiarch and ascending aortic replacement with deep hypothermic circulatory arrest, aortic valve replacement (CE Inspiris 21 mm valve), Left atrial appendage exclusion with AtriCure AtriClip 35mm device, Ultrasound guided insertion of right femoral arterial line, Application of Prevena Incision management system performed on March 6, 2025 by Dr. Clark.       Post-op Follow-up    Post operative recovery was uneventful without any major complications.  She was discharged home on postoperative day 4.  She was seen back in the office last week for follow-up with removal of Prevena.  Surgical incisions were normal postoperative appearance and overall she was doing well.  Afterwards, she was seen by her PCP and found to be tachycardic.  Lopressor was increased to 50 mg twice daily and losartan decreased to 25 mg daily.  Lab work was checked which showed leukocytosis.  Due to concern for postoperative infection, she was brought back to the office today for follow-up with labs including urinalysis and a chest x-ray.  She is joined by her spouse.  No fevers.  Sleep habits are fair and have improved.  She has great pain control.  She did take Tylenol overnight for back pain.  No fever sweats or chills.  No drainage from surgical sites.  She is showering daily with soap and water.  Denies sternal clicks.  Appetite is great and she reports she is hungry frequently.  She is walking well independently.  Dry mouth symptoms have improved. She denies history of allergies; slight postnasal drainage.       The following portions of the patient's history were reviewed and updated as appropriate: allergies, current medications, past family history, past medical history, past social history, past surgical history and problem  "list.    Current Outpatient Medications   Medication Sig Dispense Refill    ALPRAZolam (XANAX) 0.25 MG tablet Take 0.5-1 tablets by mouth At Night As Needed for Anxiety or Sleep.      ascorbic acid (VITAMIN C) 500 MG tablet Take 1 tablet by mouth Daily.      aspirin 81 MG EC tablet Take 1 tablet by mouth Daily.      B Complex-C (VITAMIN B + C COMPLEX PO) Take 1 tablet by mouth Daily.      CALCIUM PO Take 1 tablet by mouth Daily.      clopidogrel (PLAVIX) 75 MG tablet Take 1 tablet by mouth Daily. 30 tablet 2    lansoprazole (PREVACID) 15 MG capsule Take 1 capsule by mouth Daily.      losartan (Cozaar) 50 MG tablet Take 1 tablet by mouth Daily. (Patient taking differently: Take 0.5 tablets by mouth Daily.) 30 tablet 11    metoprolol tartrate (LOPRESSOR) 25 MG tablet Take 1 tablet by mouth 2 (Two) Times a Day. (Patient taking differently: Take 2 tablets by mouth 2 (Two) Times a Day.) 60 tablet 2    Multiple Vitamins-Minerals (ZINC PO) Take 1 tablet by mouth Daily.      multivitamin with minerals tablet tablet Take 1 tablet by mouth Daily.      Omega-3 Fatty Acids (Omega-3 Fish Oil) 1000 MG capsule Take 1 capsule by mouth Daily.      oxyCODONE-acetaminophen (Percocet) 5-325 MG per tablet Take 1 tablet by mouth Every 6 (Six) Hours As Needed (pain). 5 tablet 0    Probiotic Product (PROBIOTIC ADVANCED PO) Take 1 capsule by mouth Daily.      furosemide (Lasix) 40 MG tablet Take 1 tablet by mouth Daily for 7 days. 7 tablet 0     No current facility-administered medications for this visit.     Review of Systems   Constitutional:  Negative for chills, diaphoresis and fever.   HENT:  Positive for postnasal drip. Negative for trouble swallowing and voice change.    Respiratory:  Negative for cough, shortness of breath and wheezing.    Cardiovascular:  Positive for chest pain (incisional). Negative for palpitations and leg swelling.       Objective   Visit Vitals  /70   Pulse 80   Ht 165.1 cm (65\")   Wt 65.5 kg (144 lb 8 " oz)   SpO2 95%   BMI 24.05 kg/m²       Physical Exam  Vitals reviewed.   Constitutional:       General: She is not in acute distress.     Appearance: Normal appearance. She is well-developed. She is not diaphoretic.   HENT:      Head: Normocephalic.   Eyes:      Pupils: Pupils are equal, round, and reactive to light.   Neck:      Vascular: No JVD.   Cardiovascular:      Rate and Rhythm: Normal rate and regular rhythm.      Heart sounds: Normal heart sounds.      No friction rub.   Pulmonary:      Effort: Pulmonary effort is normal. No respiratory distress.      Breath sounds: Normal breath sounds. No stridor. No wheezing or rales.   Abdominal:      General: There is no distension.      Palpations: Abdomen is soft.      Tenderness: There is no abdominal tenderness.   Musculoskeletal:      Right lower leg: No edema.      Left lower leg: No edema.   Skin:     General: Skin is warm and dry.      Coloration: Skin is not pale.      Findings: No erythema or rash.      Comments: Sternal incision clean, dry and intact. Steri strips remain. No drainage or evidence of infection.  No clicks. Sternum stable.  Scabs overlying previous mediastinal chest tube site.     Neurological:      Mental Status: She is alert and oriented to person, place, and time.      Cranial Nerves: No cranial nerve deficit.   Psychiatric:         Behavior: Behavior normal.     Lab on 03/20/2025   Component Date Value Ref Range Status    Color, UA 03/20/2025 Dark Yellow (A)  Yellow, Straw Final    Appearance, UA 03/20/2025 Clear  Clear Final    pH, UA 03/20/2025 5.5  5.0 - 8.0 Final    Specific Gravity, UA 03/20/2025 1.022  1.005 - 1.030 Final    Glucose, UA 03/20/2025 Negative  Negative Final    Ketones, UA 03/20/2025 Trace (A)  Negative Final    Bilirubin, UA 03/20/2025 Negative  Negative Final    Blood, UA 03/20/2025 Negative  Negative Final    Protein, UA 03/20/2025 Negative  Negative Final    Leuk Esterase, UA 03/20/2025 Negative  Negative Final     Nitrite, UA 03/20/2025 Negative  Negative Final    Urobilinogen, UA 03/20/2025 1.0 E.U./dL  0.2 - 1.0 E.U./dL Final    WBC 03/20/2025 9.87  3.40 - 10.80 10*3/mm3 Final    RBC 03/20/2025 3.68 (L)  3.77 - 5.28 10*6/mm3 Final    Hemoglobin 03/20/2025 11.0 (L)  12.0 - 15.9 g/dL Final    Hematocrit 03/20/2025 34.5  34.0 - 46.6 % Final    MCV 03/20/2025 93.8  79.0 - 97.0 fL Final    MCH 03/20/2025 29.9  26.6 - 33.0 pg Final    MCHC 03/20/2025 31.9  31.5 - 35.7 g/dL Final    RDW 03/20/2025 13.5  12.3 - 15.4 % Final    RDW-SD 03/20/2025 46.4  37.0 - 54.0 fl Final    MPV 03/20/2025 9.3  6.0 - 12.0 fL Final    Platelets 03/20/2025 350  140 - 450 10*3/mm3 Final    Neutrophil % 03/20/2025 70.9  42.7 - 76.0 % Final    Lymphocyte % 03/20/2025 16.7 (L)  19.6 - 45.3 % Final    Monocyte % 03/20/2025 9.6  5.0 - 12.0 % Final    Eosinophil % 03/20/2025 1.5  0.3 - 6.2 % Final    Basophil % 03/20/2025 0.6  0.0 - 1.5 % Final    Immature Grans % 03/20/2025 0.7 (H)  0.0 - 0.5 % Final    Neutrophils, Absolute 03/20/2025 6.99  1.70 - 7.00 10*3/mm3 Final    Lymphocytes, Absolute 03/20/2025 1.65  0.70 - 3.10 10*3/mm3 Final    Monocytes, Absolute 03/20/2025 0.95 (H)  0.10 - 0.90 10*3/mm3 Final    Eosinophils, Absolute 03/20/2025 0.15  0.00 - 0.40 10*3/mm3 Final    Basophils, Absolute 03/20/2025 0.06  0.00 - 0.20 10*3/mm3 Final    Immature Grans, Absolute 03/20/2025 0.07 (H)  0.00 - 0.05 10*3/mm3 Final    nRBC 03/20/2025 0.0  0.0 - 0.2 /100 WBC Final     CXR:  Study Result    Narrative & Impression   EXAMINATION: XR CHEST 2 VW-     3/20/2025 9:26 AM     HISTORY: leukocytosis; D72.829-Elevated white blood cell count,  unspecified     2 views of the chest are compared with the previous study dated  3/9/2025.     The lungs are moderately well-expanded.     There is no evidence of recent infiltrate, pleural effusion, pulmonary  congestion or pneumothorax.     The heart size is in the normal range. There is evidence of prior  cardiac surgery. Left  atrial appendage clamp and aortic valve prosthesis  are in place.     No acute bony abnormality.     IMPRESSION:  1. No active cardiopulmonary disease.        This report was signed and finalized on 3/20/2025 12:11 PM by Dr. Scarlett Hampton MD.   Independent review- no infiltrate, no pneumothorax or pleural effusion      Assessment & Plan       Diagnoses and all orders for this visit:    1. Aneurysm of ascending aorta without rupture (Primary)    2. Aortic stenosis, severe    3. Primary hypertension             Overall, Mary Salazar is doing well. Surgical incisions are clean and dry without evidence of infection. We discussed current sternotomy precautions and how these will not be advanced yet. Continue post op surgical wound care: shower daily with antibacterial soap and water, avoid use of lotions, creams, ointments, powders etc, allow steri strips to fall off on their own. Following post op cardiac surgery home instructions.     Labs and chest x-ray reviewed.  WBC has now normalized to 9.87.  There is no left shift.  Urinalysis is negative for nitrates, leuk leuk esterase, blood.  Chest x-ray is without infiltrates.  Surgical sites normal for postoperative appearance without evidence of infection.    Medication reconciliation performed.  No new changes made today. Discussed over the counter allergy medications including astelin nasal spray.     Provided support and encouragement. All questions have been answered to the best of my ability. Will discuss starting cardiac rehabilitation at official 1 month post op visit. Patient has follow up with Dr. Clark in a few weeks. Patient has follow up with Cardiology in a few weeks. Patient has been instructed to contact our office with any questions or concerns should they arise prior to the next office visit.     BMI is >= 25 and <30. (Overweight) The following options were offered after discussion;: weight loss educational material (shared in after visit summary).         Mary Salazar  reports that she has never smoked. She has been exposed to tobacco smoke. She has never used smokeless tobacco.          Advance Care Planning   ACP discussion was declined by the patient. Patient does not have an advance directive, declines further assistance.

## 2025-04-09 PROBLEM — Z95.2 S/P AVR: Status: ACTIVE | Noted: 2025-04-09

## 2025-04-11 ENCOUNTER — TELEPHONE (OUTPATIENT)
Dept: CARDIAC SURGERY | Facility: CLINIC | Age: 70
End: 2025-04-11

## 2025-04-11 NOTE — TELEPHONE ENCOUNTER
"  Caller: Mary Salazar \"Karissa\"    Relationship: Self    Best call back number: [phone]    What is the best time to reach you: ANY     Who are you requesting to speak with (clinical staff, provider,  specific staff member): CLINICAL     What was the call regarding: PT WANTS TO KNOW WHAT HER SONS AND BROTHERS NEED TO DO TO GET CHECKED FOR THE AORTA ANEURYSM. PT WANTS A CALL BACK ON SOME GUIDANCE ON HOW TO PROCEED.     Is it okay if the provider responds through MyChart: NO, PT WANTS A CALLBACK       "

## 2025-04-14 NOTE — TELEPHONE ENCOUNTER
Called patient back and spoke with her, she reports that follow-up with Dr. Clark last week he informed her that her aneurysm was caused by a genetic trait and that her family members should be tested.  She is calling because she wants her family members to all see Dr. Clark.  Advised her that they would need to speak to their primary care physicians first and have imaging obtained and then if an aneurysm is identified, could be referred to Dr. Clark at that time.  She verbalizes understanding and confirmed future follow-up.

## 2025-04-21 ENCOUNTER — OFFICE VISIT (OUTPATIENT)
Dept: CARDIOLOGY | Facility: CLINIC | Age: 70
End: 2025-04-21
Payer: MEDICARE

## 2025-04-21 VITALS
SYSTOLIC BLOOD PRESSURE: 144 MMHG | DIASTOLIC BLOOD PRESSURE: 82 MMHG | WEIGHT: 144.8 LBS | OXYGEN SATURATION: 98 % | BODY MASS INDEX: 24.12 KG/M2 | HEART RATE: 83 BPM | HEIGHT: 65 IN

## 2025-04-21 DIAGNOSIS — I10 ESSENTIAL HYPERTENSION: ICD-10-CM

## 2025-04-21 DIAGNOSIS — Z95.2 S/P AVR: Primary | ICD-10-CM

## 2025-04-21 PROCEDURE — 3079F DIAST BP 80-89 MM HG: CPT | Performed by: NURSE PRACTITIONER

## 2025-04-21 PROCEDURE — 93000 ELECTROCARDIOGRAM COMPLETE: CPT | Performed by: NURSE PRACTITIONER

## 2025-04-21 PROCEDURE — 99214 OFFICE O/P EST MOD 30 MIN: CPT | Performed by: NURSE PRACTITIONER

## 2025-04-21 PROCEDURE — 3077F SYST BP >= 140 MM HG: CPT | Performed by: NURSE PRACTITIONER

## 2025-04-21 PROCEDURE — 1159F MED LIST DOCD IN RCRD: CPT | Performed by: NURSE PRACTITIONER

## 2025-04-21 PROCEDURE — 1160F RVW MEDS BY RX/DR IN RCRD: CPT | Performed by: NURSE PRACTITIONER

## 2025-04-21 RX ORDER — LOSARTAN POTASSIUM 50 MG/1
50 TABLET ORAL DAILY
Qty: 30 TABLET | Refills: 11 | Status: SHIPPED | OUTPATIENT
Start: 2025-04-21

## 2025-04-21 NOTE — PROGRESS NOTES
Subjective:     Encounter Date:04/21/2025      Patient ID: Mary Salazar is a 69 y.o. female.    Chief Complaint: follow up s/p AVR    History of Present Illness    The patient presents to follow-up regarding her recent surgery in early March with Dr. Clark that she had AVR, left atrial appendage exclusion and ascending and hemiarch aortic replacement.  Leading up to surgery she was experiencing symptomatic severe aortic stenosis with shortness of breath, swelling and fatigue.  She did very well after discharge and was able to be discharged home on postop day 4.  She did take a short course of Lasix.    Since discharge, some adjustments have been made in her medication regimen.  Dr. Clark recently transitioned her to Toprol-XL 50 mg daily.    She is going to have a CTA of the chest, echocardiogram and another follow-up with Dr. Clark next month.    Workup prior to surgery revealed normal coronary arteries.    Today she states she is feeling well.  Her only complaint is chest wall soreness and she is working on some stretches as advised by Dr. Clark.  She has not yet started cardiac rehab but is walking at least 20 minutes daily without any chest pain or shortness of breath and is feeling well.  She does feel the sensation of her heart beating, particularly at night but states her resting heart rates are mostly in the 80s.  Her systolic blood pressures vary and are intermittently above 140.  She denies edema, orthopnea, PND, syncope or near syncope.  Weight is stable.     The following portions of the patient's history were reviewed and updated as appropriate: allergies, current medications, past family history, past medical history, past social history, past surgical history and problem list.    Review of Systems   Constitutional: Negative for malaise/fatigue.   Cardiovascular:  Negative for chest pain, claudication, dyspnea on exertion, leg swelling, near-syncope, orthopnea, palpitations, paroxysmal nocturnal  "dyspnea and syncope.   Respiratory:  Negative for cough and shortness of breath.    Hematologic/Lymphatic: Does not bruise/bleed easily.   Musculoskeletal:  Negative for falls.        Chest wall soreness    Gastrointestinal:  Negative for bloating.   Neurological:  Negative for dizziness, light-headedness and weakness.       Allergies   Allergen Reactions    Lactose Intolerance (Gi) Other (See Comments)     Gas/bloating reaction    Talwin [Pentazocine] Hives    Penicillins Swelling    Sulfa Antibiotics Hives       Current Outpatient Medications:     ALPRAZolam (XANAX) 0.25 MG tablet, Take 0.5-1 tablets by mouth At Night As Needed for Anxiety or Sleep., Disp: , Rfl:     ascorbic acid (VITAMIN C) 500 MG tablet, Take 1 tablet by mouth Daily., Disp: , Rfl:     aspirin 81 MG EC tablet, Take 1 tablet by mouth Daily., Disp: , Rfl:     B Complex-C (VITAMIN B + C COMPLEX PO), Take 1 tablet by mouth Daily., Disp: , Rfl:     CALCIUM PO, Take 1 tablet by mouth Daily., Disp: , Rfl:     clopidogrel (PLAVIX) 75 MG tablet, Take 1 tablet by mouth Daily., Disp: 30 tablet, Rfl: 2    lansoprazole (PREVACID) 15 MG capsule, Take 1 capsule by mouth Daily., Disp: , Rfl:     metoprolol succinate XL (Toprol XL) 50 MG 24 hr tablet, Take 1 tablet by mouth Daily., Disp: 30 tablet, Rfl: 2    multivitamin with minerals tablet tablet, Take 1 tablet by mouth Daily., Disp: , Rfl:     Omega-3 Fatty Acids (Omega-3 Fish Oil) 1000 MG capsule, Take 1 capsule by mouth Daily., Disp: , Rfl:     Probiotic Product (PROBIOTIC ADVANCED PO), Take 1 capsule by mouth Daily., Disp: , Rfl:     losartan (Cozaar) 50 MG tablet, Take 1 tablet by mouth Daily., Disp: 30 tablet, Rfl: 11         Objective:    /82   Pulse 83   Ht 165.1 cm (65\")   Wt 65.7 kg (144 lb 12.8 oz)   SpO2 98%   BMI 24.10 kg/m²        Vitals and nursing note reviewed.   Constitutional:       General: Not in acute distress.     Appearance: Well-developed and not in distress. Not " "diaphoretic.   Neck:      Vascular: No JVD.   Pulmonary:      Effort: Pulmonary effort is normal. No respiratory distress.      Breath sounds: Normal breath sounds.   Cardiovascular:      Normal rate. Regular rhythm.      Murmurs: There is a grade 1/6 systolic murmur.   Edema:     Peripheral edema absent.   Abdominal:      Tenderness: There is no abdominal tenderness.   Skin:     General: Skin is warm and dry.   Neurological:      Mental Status: Alert and oriented to person, place, and time.         Lab Review:   Lab Results   Component Value Date    GLUCOSE 120 (H) 03/10/2025    BUN 21 03/10/2025    CREATININE 0.72 03/10/2025     03/10/2025    K 4.4 03/10/2025     03/10/2025    CALCIUM 9.3 03/10/2025    PROTEINTOT 6.3 03/09/2025    ALBUMIN 3.6 03/09/2025    ALT 14 03/09/2025    AST 31 03/09/2025    ALKPHOS 62 03/09/2025    BILITOT 0.3 03/09/2025    GLOB 2.7 03/09/2025    AGRATIO 1.3 03/09/2025    BCR 29.2 (H) 03/10/2025    ANIONGAP 10.0 03/10/2025    EGFR 89.5 03/10/2025        No results found for: \"CHOL\", \"CHLPL\", \"TRIG\", \"HDL\", \"LDL\", \"LDLDIRECT\"     Lab Results   Component Value Date    HGBA1C 5.80 (H) 03/04/2025        Lab Results   Component Value Date    WBC 9.87 03/20/2025    HGB 11.0 (L) 03/20/2025    HCT 34.5 03/20/2025    MCV 93.8 03/20/2025     03/20/2025              ECG 12 Lead    Date/Time: 4/21/2025 6:15 PM  Performed by: Lenka Feliciano APRN    Authorized by: Lenka Feliciano APRN  Comparison: compared with previous ECG from 3/8/2025  Similar to previous ECG  Rhythm: sinus rhythm  BPM: 83    Clinical impression: non-specific ECG          Results for orders placed during the hospital encounter of 03/06/25    Intra-Op TAVR Transesophageal Echo    Interpretation Summary    This is a limited intraoperative transesophageal echocardiogram.    Left ventricular systolic function is normal.    Moderate native aortic valve stenosis is present.    Mild dilation of the aortic root is present.   "  Successful aortic valve replacement with mean gradient 6 mmHg and no appreciable transvalvular or paravalvular regurgitation.      Assessment:      Problem List Items Addressed This Visit          Cardiac and Vasculature    Essential hypertension    Relevant Medications    losartan (Cozaar) 50 MG tablet    S/P AVR - Primary    Overview   1.  Aortic hemiarch and ascending aortic replacement with deep hypothermic circulatory arrest  2.  Aortic valve replacement (CE Inspiris 21 mm valve)  3.  Left atrial appendage exclusion (Atricure Atriclip 35 mm device)    3/6/2025 per Dr. Clark for severe symptomatic severe aortic stenosis (bicuspid valve) and ascending aortic aneurysm              Plan:     The patient is doing well from a cardiovascular standpoint.  She is going to have her 2D echocardiogram and CTA of the chest coming up in approximately 4 weeks with a follow-up with Dr. Clark thereafter.  She will need a 2D echocardiogram again 5 years postoperatively, sooner if warranted based on exam findings, symptoms or if there are any abnormalities on upcoming echo that need to be assessed sooner.  We discussed the need for SBE prophylaxis for life.  She will continue aspirin 81 mg daily indefinitely.  She will continue Plavix 75 mg daily for 3 months postoperatively as prescribed by Dr. Clark.  She will continue her current dose of Toprol-XL.  For better blood pressure control I am uptitrating her losartan to 50 mg today.  She will continue to monitor and call here or PCP with persistent elevations.    She will follow-up with Dr. Randhawa in 1 year, but call sooner with symptoms or concerns.    I spent 34 minutes caring for Mary on this date of service. This time includes time spent by me in the following activities: preparing for the visit, reviewing tests, performing a medically appropriate examination and/or evaluation, counseling and educating the patient/family/caregiver, documenting information in the medical  record, and ordering medications

## 2025-05-23 ENCOUNTER — HOSPITAL ENCOUNTER (OUTPATIENT)
Dept: CARDIOLOGY | Facility: HOSPITAL | Age: 70
Discharge: HOME | End: 2025-05-23
Payer: MEDICARE

## 2025-05-23 ENCOUNTER — HOSPITAL ENCOUNTER (OUTPATIENT)
Dept: CT IMAGING | Facility: HOSPITAL | Age: 70
Discharge: HOME | End: 2025-05-23
Payer: MEDICARE

## 2025-05-23 VITALS
HEIGHT: 65 IN | SYSTOLIC BLOOD PRESSURE: 144 MMHG | DIASTOLIC BLOOD PRESSURE: 82 MMHG | WEIGHT: 144 LBS | BODY MASS INDEX: 23.99 KG/M2

## 2025-05-23 DIAGNOSIS — Z95.2 S/P AVR: ICD-10-CM

## 2025-05-23 DIAGNOSIS — I71.21 ANEURYSM OF ASCENDING AORTA WITHOUT RUPTURE: ICD-10-CM

## 2025-05-23 LAB
AORTIC DIMENSIONLESS INDEX: 0.48 (DI)
AV MEAN PRESS GRAD SYS DOP V1V2: 14.5 MMHG
AV VMAX SYS DOP: 233.6 CM/SEC
BH CV ECHO MEAS - AO MAX PG: 22.5 MMHG
BH CV ECHO MEAS - AO ROOT DIAM: 2.8 CM
BH CV ECHO MEAS - AO V2 VTI: 50.1 CM
BH CV ECHO MEAS - AVA(I,D): 1.16 CM2
BH CV ECHO MEAS - EDV(CUBED): 61.7 ML
BH CV ECHO MEAS - EDV(MOD-SP2): 68.7 ML
BH CV ECHO MEAS - EDV(MOD-SP4): 60.4 ML
BH CV ECHO MEAS - EF(MOD-SP2): 60.1 %
BH CV ECHO MEAS - EF(MOD-SP4): 56 %
BH CV ECHO MEAS - ESV(CUBED): 14.3 ML
BH CV ECHO MEAS - ESV(MOD-SP2): 27.4 ML
BH CV ECHO MEAS - ESV(MOD-SP4): 26.6 ML
BH CV ECHO MEAS - FS: 38.6 %
BH CV ECHO MEAS - IVS/LVPW: 1.18 CM
BH CV ECHO MEAS - IVSD: 1.03 CM
BH CV ECHO MEAS - LA DIMENSION: 4.6 CM
BH CV ECHO MEAS - LAT PEAK E' VEL: 8.6 CM/SEC
BH CV ECHO MEAS - LV DIASTOLIC VOL/BSA (35-75): 35.1 CM2
BH CV ECHO MEAS - LV MASS(C)D: 116.8 GRAMS
BH CV ECHO MEAS - LV MAX PG: 5.3 MMHG
BH CV ECHO MEAS - LV MEAN PG: 2.9 MMHG
BH CV ECHO MEAS - LV SYSTOLIC VOL/BSA (12-30): 15.5 CM2
BH CV ECHO MEAS - LV V1 MAX: 115.3 CM/SEC
BH CV ECHO MEAS - LV V1 VTI: 23.8 CM
BH CV ECHO MEAS - LVIDD: 4 CM
BH CV ECHO MEAS - LVIDS: 2.43 CM
BH CV ECHO MEAS - LVOT AREA: 2.45 CM2
BH CV ECHO MEAS - LVOT DIAM: 1.76 CM
BH CV ECHO MEAS - LVPWD: 0.88 CM
BH CV ECHO MEAS - MED PEAK E' VEL: 5.5 CM/SEC
BH CV ECHO MEAS - MR MAX PG: 127.1 MMHG
BH CV ECHO MEAS - MR MAX VEL: 563.6 CM/SEC
BH CV ECHO MEAS - MR MEAN PG: 77 MMHG
BH CV ECHO MEAS - MR MEAN VEL: 400.8 CM/SEC
BH CV ECHO MEAS - MR VTI: 199.1 CM
BH CV ECHO MEAS - MV A MAX VEL: 102.9 CM/SEC
BH CV ECHO MEAS - MV DEC SLOPE: 445.2 CM/SEC2
BH CV ECHO MEAS - MV DEC TIME: 0.18 SEC
BH CV ECHO MEAS - MV E MAX VEL: 79 CM/SEC
BH CV ECHO MEAS - MV E/A: 0.77
BH CV ECHO MEAS - MV MAX PG: 6.3 MMHG
BH CV ECHO MEAS - MV MEAN PG: 3.2 MMHG
BH CV ECHO MEAS - MV V2 VTI: 40.2 CM
BH CV ECHO MEAS - MVA(VTI): 1.45 CM2
BH CV ECHO MEAS - RAP SYSTOLE: 3 MMHG
BH CV ECHO MEAS - RVSP: 24.8 MMHG
BH CV ECHO MEAS - SV(LVOT): 58.3 ML
BH CV ECHO MEAS - SV(MOD-SP2): 41.3 ML
BH CV ECHO MEAS - SV(MOD-SP4): 33.9 ML
BH CV ECHO MEAS - SVI(LVOT): 33.9 ML/M2
BH CV ECHO MEAS - SVI(MOD-SP2): 24 ML/M2
BH CV ECHO MEAS - SVI(MOD-SP4): 19.7 ML/M2
BH CV ECHO MEAS - TAPSE (>1.6): 0.79 CM
BH CV ECHO MEAS - TR MAX PG: 21.8 MMHG
BH CV ECHO MEAS - TR MAX VEL: 233.2 CM/SEC
BH CV ECHO MEASUREMENTS AVERAGE E/E' RATIO: 11.21
BH CV XLRA - RV BASE: 3.6 CM
BH CV XLRA - RV LENGTH: 6 CM
BH CV XLRA - RV MID: 2.9 CM
LEFT ATRIUM VOLUME INDEX: 35 ML/M2
LEFT ATRIUM VOLUME: 60 ML

## 2025-05-23 PROCEDURE — 25510000001 IOPAMIDOL PER 1 ML: Performed by: NURSE PRACTITIONER

## 2025-05-23 PROCEDURE — 71275 CT ANGIOGRAPHY CHEST: CPT

## 2025-05-23 PROCEDURE — 93306 TTE W/DOPPLER COMPLETE: CPT

## 2025-05-23 RX ORDER — IOPAMIDOL 755 MG/ML
100 INJECTION, SOLUTION INTRAVASCULAR
Status: COMPLETED | OUTPATIENT
Start: 2025-05-23 | End: 2025-05-23

## 2025-05-23 RX ADMIN — IOPAMIDOL 100 ML: 755 INJECTION, SOLUTION INTRAVENOUS at 09:55

## 2025-05-28 ENCOUNTER — OFFICE VISIT (OUTPATIENT)
Dept: CARDIAC SURGERY | Facility: CLINIC | Age: 70
End: 2025-05-28
Payer: MEDICARE

## 2025-05-28 VITALS
SYSTOLIC BLOOD PRESSURE: 130 MMHG | OXYGEN SATURATION: 96 % | BODY MASS INDEX: 24.49 KG/M2 | HEART RATE: 79 BPM | DIASTOLIC BLOOD PRESSURE: 77 MMHG | HEIGHT: 65 IN | WEIGHT: 147 LBS

## 2025-05-28 DIAGNOSIS — R91.1 LUNG NODULE: ICD-10-CM

## 2025-05-28 DIAGNOSIS — I71.21 ANEURYSM OF ASCENDING AORTA WITHOUT RUPTURE: Primary | ICD-10-CM

## 2025-05-28 DIAGNOSIS — Z95.2 S/P AVR: ICD-10-CM

## 2025-05-29 LAB
AORTIC DIMENSIONLESS INDEX: 0.48 (DI)
AV MEAN PRESS GRAD SYS DOP V1V2: 14.5 MMHG
AV VMAX SYS DOP: 233.6 CM/SEC
BH CV ECHO MEAS - AO MAX PG: 22.5 MMHG
BH CV ECHO MEAS - AO ROOT DIAM: 2.8 CM
BH CV ECHO MEAS - AO V2 VTI: 50.1 CM
BH CV ECHO MEAS - AVA(I,D): 1.16 CM2
BH CV ECHO MEAS - EDV(CUBED): 61.7 ML
BH CV ECHO MEAS - EDV(MOD-SP2): 68.7 ML
BH CV ECHO MEAS - EDV(MOD-SP4): 60.4 ML
BH CV ECHO MEAS - EF(MOD-SP2): 60.1 %
BH CV ECHO MEAS - EF(MOD-SP4): 56 %
BH CV ECHO MEAS - ESV(CUBED): 14.3 ML
BH CV ECHO MEAS - ESV(MOD-SP2): 27.4 ML
BH CV ECHO MEAS - ESV(MOD-SP4): 26.6 ML
BH CV ECHO MEAS - FS: 38.6 %
BH CV ECHO MEAS - IVS/LVPW: 1.18 CM
BH CV ECHO MEAS - IVSD: 1.03 CM
BH CV ECHO MEAS - LA DIMENSION: 4.6 CM
BH CV ECHO MEAS - LAT PEAK E' VEL: 8.6 CM/SEC
BH CV ECHO MEAS - LV DIASTOLIC VOL/BSA (35-75): 35.1 CM2
BH CV ECHO MEAS - LV MASS(C)D: 116.8 GRAMS
BH CV ECHO MEAS - LV MAX PG: 5.3 MMHG
BH CV ECHO MEAS - LV MEAN PG: 2.9 MMHG
BH CV ECHO MEAS - LV SYSTOLIC VOL/BSA (12-30): 15.5 CM2
BH CV ECHO MEAS - LV V1 MAX: 115.3 CM/SEC
BH CV ECHO MEAS - LV V1 VTI: 23.8 CM
BH CV ECHO MEAS - LVIDD: 4 CM
BH CV ECHO MEAS - LVIDS: 2.43 CM
BH CV ECHO MEAS - LVOT AREA: 2.45 CM2
BH CV ECHO MEAS - LVOT DIAM: 1.76 CM
BH CV ECHO MEAS - LVPWD: 0.88 CM
BH CV ECHO MEAS - MED PEAK E' VEL: 5.5 CM/SEC
BH CV ECHO MEAS - MR MAX PG: 127.1 MMHG
BH CV ECHO MEAS - MR MAX VEL: 563.6 CM/SEC
BH CV ECHO MEAS - MR MEAN PG: 77 MMHG
BH CV ECHO MEAS - MR MEAN VEL: 400.8 CM/SEC
BH CV ECHO MEAS - MR VTI: 199.1 CM
BH CV ECHO MEAS - MV A MAX VEL: 102.9 CM/SEC
BH CV ECHO MEAS - MV DEC SLOPE: 445.2 CM/SEC2
BH CV ECHO MEAS - MV DEC TIME: 0.18 SEC
BH CV ECHO MEAS - MV E MAX VEL: 79 CM/SEC
BH CV ECHO MEAS - MV E/A: 0.77
BH CV ECHO MEAS - MV MAX PG: 6.3 MMHG
BH CV ECHO MEAS - MV MEAN PG: 3.2 MMHG
BH CV ECHO MEAS - MV V2 VTI: 40.2 CM
BH CV ECHO MEAS - MVA(VTI): 1.45 CM2
BH CV ECHO MEAS - RAP SYSTOLE: 3 MMHG
BH CV ECHO MEAS - RVSP: 24.8 MMHG
BH CV ECHO MEAS - SV(LVOT): 58.3 ML
BH CV ECHO MEAS - SV(MOD-SP2): 41.3 ML
BH CV ECHO MEAS - SV(MOD-SP4): 33.9 ML
BH CV ECHO MEAS - SVI(LVOT): 33.9 ML/M2
BH CV ECHO MEAS - SVI(MOD-SP2): 24 ML/M2
BH CV ECHO MEAS - SVI(MOD-SP4): 19.7 ML/M2
BH CV ECHO MEAS - TAPSE (>1.6): 1.3 CM
BH CV ECHO MEAS - TR MAX PG: 21.8 MMHG
BH CV ECHO MEAS - TR MAX VEL: 233.2 CM/SEC
BH CV ECHO MEASUREMENTS AVERAGE E/E' RATIO: 11.21
BH CV XLRA - RV BASE: 3.9 CM
BH CV XLRA - RV LENGTH: 6 CM
BH CV XLRA - RV MID: 2.9 CM
LEFT ATRIUM VOLUME INDEX: 35 ML/M2
LEFT ATRIUM VOLUME: 60 ML

## 2025-06-11 NOTE — PROGRESS NOTES
"Chief Complaint   Patient presents with    Post-op Follow-up     Patient had AVR on 3/6. CT/Echo on 5/23.          History of Present Illness  The patient presents for evaluation following a valve replacement and aneurysm repair.    She reports a satisfactory recovery post-surgery, which included valve replacement and aneurysm repair. Engaging in physical activities has not resulted in any associated pain. She attends rehabilitation sessions at the [facility] three days a week and has been able to lift objects weighing up to 15 pounds without discomfort or unusual sensations such as clicking or movement. However, she reports some residual soreness, particularly when the seatbelt rubs against her chest. She has been applying Mederma to her surgical incision.    She has been experiencing lower back pain, which she attributes to her recent surgery. Chiropractic care, involving an adjustment while seated, provided some relief. Additionally, she reports hip pain, which she believes is unrelated to her surgery.    She has been experiencing severe leg cramps and finger spasms, causing significant discomfort. Despite attempts to manage these symptoms with magnesium, potassium, and leg cramp pills, she has not found relief. The severity of the cramps has led to episodes of fainting, including one instance where she lost consciousness in the bathroom. Efforts to stay hydrated by drinking water have been made.    PAST SURGICAL HISTORY:  Valve replacement and aneurysm repair.    SOCIAL HISTORY  Exercise: Attends rehab at [facility] three days a week and walks about 3 to 3.8 miles per hour.  Tobacco: The patient does not smoke.    The following portions of the patient's history were reviewed and updated as appropriate: allergies, current medications, past family history, past medical history, past social history, past surgical history and problem list.        Objective   Visit Vitals  /77   Pulse 79   Ht 165.1 cm (65\")   Wt 66.7 kg " (147 lb)   SpO2 96%   BMI 24.46 kg/m²       Physical Exam  Physical Exam  Vital Signs: BMI is 24  Physical Exam:    General: No acute distress, in good spirits  Cardiovascular: RRR, no murmur, rubs, or gallops.    Pulmonary: Clear to auscultation bilaterally, no wheezing, rubs, or rales.  Chest: The sternum is stable. No clicks.  The sternotomy incision is healed.  Abdomen: Soft, nondistended, and nontender.  Extremities: Warm, moves all extremities.  Neurologic:  No focal deficits, CN II-XII intact grossly.          Adult Transthoracic Echo Complete W/ Cont if Necessary Per Protocol  Result Date: 5/29/2025  Narrative:   Left ventricular systolic function is normal. Left ventricular ejection fraction appears to be 56 - 60%.   There is a bioprosthetic aortic valve present (#21 CE) is stable, in appropriate position, with no evidence of paravalvular or valvular regurgitation, and acceptable transvalvular mean gradient of 14 mmHg.   Left ventricular diastolic function is consistent with (grade I) impaired relaxation.   The left atrial cavity is moderately dilated.   Estimated right ventricular systolic pressure from tricuspid regurgitation is normal (<35 mmHg).   Normal size and function of the right ventricle.   Mild mitral regurgitation.  No significant (worse than mild) valvular pathology.   Compared to previous transthoracic study from 12/11/2024, bioprosthetic aortic valve is now in position.     CT Angiogram Chest  Result Date: 5/23/2025  Narrative: EXAM/TECHNIQUE: CT chest angiography with 3D MIP images without and with IV contrast  INDICATION: thoracic aortic aneurysm s/p repair; Z95.2-Presence of prosthetic heart valve; I71.21-Aneurysm of the ascending aorta, without rupture  COMPARISON: 1/29/2025  DLP: 546.52 mGy.cm. Automated exposure control was utilized to decrease patient radiation dose.  FINDINGS:  LUNGS AND PLEURA: Central airways are clear. No consolidation or pleural effusion. No change in mild  bilateral subpleural bandlike reticulation which may represent post viral scarring. No advanced emphysematous change. Stable 4 mm triangular nodule in the right middle lobe along the minor fissure on image 87, likely fissural lymph node. Slight increase in 4 mm right lower lobe pulmonary nodule image 110 (previously 3 mm).  LYMPH NODES: Newly enlarged 11 mm short axis precarinal/right paratracheal lymph node image 59 series 5. Newly enlarged 11 mm short axis right hilar lymph node image 69.  VASCULAR: Patient is status post aortic hemiarch and ascending aortic replacement, aortic valve replacement, and left atrial appendage exclusion clip placement. Small amount of expected postoperative fluid around the ascending aorta is noted. No evidence of pseudoaneurysm or dissection. Thoracic aorta is nonaneurysmal. Aortic arch branch origins are widely patent. No central pulmonary embolus. Trace pericardial fluid.  SOFT TISSUES: Unremarkable.  UPPER ABDOMEN: Multiple calcified gallstones. No gallbladder wall thickening or inflammation. Tiny hiatal hernia.  BONES: Prior median sternotomy with intact sternotomy wires. Dextrocurvature of the lower thoracic and upper lumbar spine. Multilevel thoracolumbar spine degenerative change.      Impression:  1.  Postoperative change of aortic hemiarch and ascending aorta replacement, aortic valve replacement, and left atrial appendage exclusion clip placement. No evidence of procedural complication. No evidence of pseudoaneurysm or aortic dissection.  2.  Slight increase in size of a small 4 mm pulmonary nodule the right lower lobe. Consider a follow-up chest CT in 1 year to confirm stability or resolution.  3.  Newly enlarged right lower paratracheal and right hilar lymph nodes. Recommend follow-up imaging to confirm stability or resolution.  4.  Cholelithiasis without evidence of cholecystitis.   This report was signed and finalized on 5/23/2025 11:34 AM by Dr. Ryan Hernandez MD.       Results  Imaging   - Transthoracic echocardiogram: 05/23/2025, Valve is well functioning without paravalvular leak. Mean gradient of 8.2. Ventricular function is preserved.   - CAT scan: 05/23/2025, Largest remaining part of aorta is 3.1 cm in size, which is the distal ascending aorta, indicating no aneurysm. The connection between the graft and the transition is normal, with no pseudoaneurysm. The root is no bigger than 3 cm in size, so not aneurysmal.   - CAT scan: Lung nodule of 4 mm in size found in the right lower lobe.    Assessment & Plan       Diagnoses and all orders for this visit:    1. Aneurysm of ascending aorta without rupture (Primary)  -     CT Angiogram Chest; Future    2. S/P AVR  -     CT Angiogram Chest; Future    3. Lung nodule  -     CT Angiogram Chest; Future      Assessment & Plan  1. Postoperative status following valve replacement and aneurysm repair:  - Transthoracic echocardiogram on 05/23/2025 shows a well-functioning valve without paravalvular leak and a mean gradient of 8.2 mmHg, consistent with intraoperative JESSICA.  - Ventricular function is preserved.  - CAT scan on 05/23/2025 indicates the largest remaining part of the aorta is 3.1 cm with no aneurysm or pseudoaneurysm.  - Aortic root is no larger than 3 cm, confirming the absence of an aneurysm.  - Discontinue Plavix after completing current supply.  - Maintain blood pressure control.  - Engage in regular exercise, gradually increasing intensity over time.  - Resume all activities, including mowing the yard, after 06/06/2025.    2. Back pain:  - May be related to recent surgery.  - Continue chiropractic adjustments as needed.    3. Lung nodule:  - 4 mm lung nodule identified in the right lower lobe.  - Likely benign given non-smoking status.  - Follow-up CAT scan in 1 year to monitor the nodule.  - Inform immediately if experiencing hemoptysis, weight loss, or new onset right-sided chest pain for expedited CAT scan.    4. Severe  leg cramps:  - Severe leg cramps and pain in fingers.  - Tried magnesium, potassium, and leg cramp pills without relief.  - Consult primary care physician for further evaluation and potential referral to a neurologist.  - Consider seeing an orthopedic surgeon for muscle and joint issues.    She is a non smoker.    Many thanks for the opportunity to care for your patient.    I will continue to keep you apprised of provided care as it ensues.      Follow-up  - Follow up in 1 year with a CTA chest      Transcribed from ambient dictation for Marck Clark MD by Marck Clark MD.  06/11/25   15:11 CDT    Patient or patient representative verbalized consent for the use of Ambient Listening during the visit with  Marck Clark MD for chart documentation. 6/11/2025  15:16 CDT

## 2025-07-07 NOTE — TELEPHONE ENCOUNTER
"    Caller: Mary Salazar \"Karissa\"    Relationship: Self    Best call back number: [phone]     Requested Prescriptions:   Requested Prescriptions     Pending Prescriptions Disp Refills    metoprolol succinate XL (Toprol XL) 50 MG 24 hr tablet 30 tablet 2     Sig: Take 1 tablet by mouth Daily.        Pharmacy where request should be sent:  [facility]     Last office visit with prescribing clinician: 5/28/2025   Last telemedicine visit with prescribing clinician: Visit date not found   Next office visit with prescribing clinician: 6/3/2026     Additional details provided by patient: PATIENT HAS 2 DAY SUPPLY LEFT.     Does the patient have less than a 3 day supply:  [x] Yes  [] No    Would you like a call back once the refill request has been completed: [x] Yes [] No    If the office needs to give you a call back, can they leave a voicemail: [x] Yes [] No    Jorge Guerrero   07/07/25 08:56 CDT         "

## 2025-07-08 NOTE — TELEPHONE ENCOUNTER
PT CALLED TODAY REGARDING THIS PRESCRIPTION.  PHARMACY SENT REQUEST ON 7.2.25 AS WELL  SHE HAS ONE DAY LEFT. TRIED TO WT TO OFFICE AND WAS UNSUCCESSFUL. PLEASE CALL THE PT AS SOON AS THIS IS TAKEN CARE OF THANK YOU

## 2025-07-09 RX ORDER — METOPROLOL SUCCINATE 50 MG/1
50 TABLET, EXTENDED RELEASE ORAL DAILY
Qty: 90 TABLET | Refills: 3 | Status: SHIPPED | OUTPATIENT
Start: 2025-07-09

## (undated) DEVICE — 6 FOOT DISPOSABLE EXTENSION CABLE WITH SAFE CONNECT / SCREW-DOWN

## (undated) DEVICE — GW STARTER FXD/CORE PTFE/COAT J/TP/3MM .035IN 10X260CM

## (undated) DEVICE — GUIDE SELECT ATRICLIP

## (undated) DEVICE — SUT PDS LP 0 CTX VIO 60IN Z990G

## (undated) DEVICE — OASIS DRAIN, SINGLE, INLINE & ATS COMPATIBLE: Brand: OASIS

## (undated) DEVICE — Device

## (undated) DEVICE — SOL IRR NACL 0.9PCT BO 1000ML

## (undated) DEVICE — GLV SURG BIOGEL LTX PF 7 1/2

## (undated) DEVICE — SUT MNCRYL 4/0 PS2 27IN UD MCP426H

## (undated) DEVICE — NDL CNTR 40CT FM MAG: Brand: MEDLINE INDUSTRIES, INC.

## (undated) DEVICE — SOL IRR NACL 0.9PCT BT 1000ML

## (undated) DEVICE — CAUTRY ACCUTEMP HI TEMP FINE TIP 2IN

## (undated) DEVICE — SUP ARMBRD ART/LINE BLU

## (undated) DEVICE — PATIENT RETURN ELECTRODE, SINGLE-USE, CONTACT QUALITY MONITORING, ADULT, WITH 9FT CORD, FOR PATIENTS WEIGING OVER 33LBS. (15KG): Brand: MEGADYNE

## (undated) DEVICE — BLAKE SILICONE DRAINS CARDIO CONNECTOR 2:1: Brand: BLAKE

## (undated) DEVICE — ELECTRD BLD EXT EDGE/INSUL 1P 4IN

## (undated) DEVICE — GW ZIPWIRE STD ANGL .035IN 150CM

## (undated) DEVICE — SUT GUT CHRM 4/0 RB1 27IN U203H

## (undated) DEVICE — GLIDESHEATH SLENDER STAINLESS STEEL KIT: Brand: GLIDESHEATH SLENDER

## (undated) DEVICE — RADIFOCUS OPTITORQUE ANGIOGRAPHIC CATHETER: Brand: OPTITORQUE

## (undated) DEVICE — SUT SILK 4/0 SUTUPAK TIES 24IN SA73H

## (undated) DEVICE — SCANLAN® SURG-I-PAW® INSTRUMENT COVERS - RED, 1/10" X 5"/ 3 MMX13 CM (2 - 5" PCS /PKG): Brand: SCANLAN® SURG-I-PAW® INSTRUMENT COVERS

## (undated) DEVICE — SUT SILK 2/0 SUTUPAK TIES 24IN SA75H

## (undated) DEVICE — TRAP FLD MINIVAC MEGADYNE 100ML

## (undated) DEVICE — CANN NASL ETCO2 LO/FLO A/

## (undated) DEVICE — SUT PROLN 4/0 RB1 36IN 4PK M8557

## (undated) DEVICE — ANTIBACTERIAL UNDYED BRAIDED (POLYGLACTIN 910), SYNTHETIC ABSORBABLE SUTURE: Brand: COATED VICRYL

## (undated) DEVICE — TR BAND RADIAL ARTERY COMPRESSION DEVICE: Brand: TR BAND

## (undated) DEVICE — PK OPN HEART 30

## (undated) DEVICE — Device: Brand: MEDEX

## (undated) DEVICE — DRAPE,ANGIO,BRACH,STERILE,38X44: Brand: MEDLINE

## (undated) DEVICE — INTENDED FOR TISSUE SEPARATION, AND OTHER PROCEDURES THAT REQUIRE A SHARP SURGICAL BLADE TO PUNCTURE OR CUT.: Brand: BARD-PARKER ® STAINLESS STEEL BLADES

## (undated) DEVICE — LIMB HOLDER, WRIST/ANKLE: Brand: DEROYAL

## (undated) DEVICE — PAD, DEFIB, ADULT, RADIOTRANS, PHYSIO: Brand: MEDLINE

## (undated) DEVICE — SUT POLY BR TP 2STRND 1/8X30IN

## (undated) DEVICE — PAD, DEFIB, ADULT, RADIOTRANS, PHILIPS: Brand: MEDLINE

## (undated) DEVICE — STERNUM BLADE, OFFSET (32.0 X 0.8 X 6.3MM)

## (undated) DEVICE — MARKR SKIN W/RULR ULTRAFINETP

## (undated) DEVICE — DRSNG SURESITE WNDW 4X4.5

## (undated) DEVICE — SOLIDIFIER LIQUI LOC PLUS 2000CC

## (undated) DEVICE — CLTH CLENS READYCLEANSE PERI CARE PK/5

## (undated) DEVICE — ELECTRD BLD EZ CLN MOD 4IN

## (undated) DEVICE — SUT PROLN 5/0 C1 DA 24IN 8725H

## (undated) DEVICE — Device: Brand: RETRACT-O-TAPE 18G X 30.5CM BLUNT NEEDLE

## (undated) DEVICE — TIBURON BRACHIAL ANGIOGRAPHY DRAPE: Brand: CONVERTORS

## (undated) DEVICE — SUT SILK 1 LBYRNTH TIES 30IN A307H

## (undated) DEVICE — PK CATH CARD 30 CA/4

## (undated) DEVICE — PK SET UP ANES OPN HEART 30

## (undated) DEVICE — SUT PROLN 3/0 SH D/A 36IN 8522H

## (undated) DEVICE — SOLIDIFIER LIQ LIQUILOC/PLUS W/TREAT 2000CC

## (undated) DEVICE — SUT ETHIB 2/0 RB1 DA 30IN WHT MX523

## (undated) DEVICE — SUT SILK 2/0 FS BLK 18IN 685G

## (undated) DEVICE — SUT ETHIB 2/0 SH SH 36IN X523H